# Patient Record
Sex: MALE | Race: WHITE | NOT HISPANIC OR LATINO | Employment: FULL TIME | URBAN - METROPOLITAN AREA
[De-identification: names, ages, dates, MRNs, and addresses within clinical notes are randomized per-mention and may not be internally consistent; named-entity substitution may affect disease eponyms.]

---

## 2024-11-19 ENCOUNTER — OFFICE VISIT (OUTPATIENT)
Dept: FAMILY MEDICINE CLINIC | Facility: CLINIC | Age: 64
End: 2024-11-19

## 2024-11-19 VITALS
HEIGHT: 70 IN | TEMPERATURE: 97.6 F | SYSTOLIC BLOOD PRESSURE: 144 MMHG | DIASTOLIC BLOOD PRESSURE: 80 MMHG | OXYGEN SATURATION: 96 % | WEIGHT: 202 LBS | BODY MASS INDEX: 28.92 KG/M2 | HEART RATE: 87 BPM

## 2024-11-19 DIAGNOSIS — Z12.5 PROSTATE CANCER SCREENING: ICD-10-CM

## 2024-11-19 DIAGNOSIS — Z76.89 ENCOUNTER TO ESTABLISH CARE WITH NEW DOCTOR: Primary | ICD-10-CM

## 2024-11-19 DIAGNOSIS — R73.03 PREDIABETES: ICD-10-CM

## 2024-11-19 DIAGNOSIS — I10 PRIMARY HYPERTENSION: ICD-10-CM

## 2024-11-19 PROCEDURE — 99204 OFFICE O/P NEW MOD 45 MIN: CPT | Performed by: FAMILY MEDICINE

## 2024-11-19 RX ORDER — HYDROCHLOROTHIAZIDE 25 MG/1
25 TABLET ORAL EVERY MORNING
COMMUNITY
Start: 2024-10-14 | End: 2024-11-19 | Stop reason: SDUPTHER

## 2024-11-19 RX ORDER — LOSARTAN POTASSIUM 100 MG/1
1 TABLET ORAL DAILY
COMMUNITY
Start: 2024-10-11 | End: 2024-11-19 | Stop reason: SDUPTHER

## 2024-11-19 RX ORDER — HYDROCHLOROTHIAZIDE 25 MG/1
25 TABLET ORAL EVERY MORNING
Qty: 90 TABLET | Refills: 2 | Status: SHIPPED | OUTPATIENT
Start: 2024-11-19

## 2024-11-19 RX ORDER — ATORVASTATIN CALCIUM 20 MG/1
1 TABLET, FILM COATED ORAL DAILY
COMMUNITY
Start: 2024-10-11 | End: 2024-11-19 | Stop reason: SDUPTHER

## 2024-11-19 RX ORDER — ATORVASTATIN CALCIUM 20 MG/1
20 TABLET, FILM COATED ORAL DAILY
Qty: 90 TABLET | Refills: 2 | Status: SHIPPED | OUTPATIENT
Start: 2024-11-19

## 2024-11-19 RX ORDER — LOSARTAN POTASSIUM 100 MG/1
100 TABLET ORAL DAILY
Qty: 90 TABLET | Refills: 2 | Status: SHIPPED | OUTPATIENT
Start: 2024-11-19

## 2024-11-19 RX ORDER — ASPIRIN 81 MG/1
81 TABLET, CHEWABLE ORAL DAILY
COMMUNITY

## 2024-11-19 NOTE — PROGRESS NOTES
Name: Trent Fonseca      : 1960      MRN: [id number]  Encounter Provider: Sierra Qurioz DO  Encounter Date: 2024   Encounter department: [facility] [address]  :  Assessment & Plan  Encounter to establish care with new doctor         Prediabetes    Orders:    Hemoglobin A1C; Future    Primary hypertension    Orders:    CBC and differential; Future    Comprehensive metabolic panel; Future    Lipid panel; Future    atorvastatin (LIPITOR) 20 mg tablet; Take 1 tablet (20 mg total) by mouth in the morning    hydroCHLOROthiazide 25 mg tablet; Take 1 tablet (25 mg total) by mouth every morning    losartan (COZAAR) 100 MG tablet; Take 1 tablet (100 mg total) by mouth in the morning    Prostate cancer screening    Orders:    PSA, Total Screen; Future         History of Present Illness     HPI    Patient presents to the office to establish care. States that he has no concerns. He is compliant with his medications.     Review of Systems    Past Medical History   History reviewed. No pertinent past medical history.  Past Surgical History:   Procedure Laterality Date    APPENDECTOMY      COLON SURGERY      ,,     Family History   Problem Relation Age of Onset    Heart disease Father     Heart disease Paternal Grandmother       reports that he has never smoked. He has never used smokeless tobacco. He reports that he does not currently use alcohol. He reports that he does not currently use drugs.  Current Outpatient Medications on File Prior to Visit   Medication Sig Dispense Refill    aspirin 81 mg chewable tablet Chew 81 mg daily      [DISCONTINUED] atorvastatin (LIPITOR) 20 mg tablet Take 1 tablet by mouth in the morning      [DISCONTINUED] hydroCHLOROthiazide 25 mg tablet Take 25 mg by mouth every morning      [DISCONTINUED] losartan (COZAAR) 100 MG tablet Take 1 tablet by mouth in the morning       No current facility-administered medications on file  "prior to visit.   No Known Allergies        Objective   /80 (Patient Position: Sitting, Cuff Size: Standard)   Pulse 87   Temp 97.6 °F (36.4 °C)   Ht 5' 10\" (1.778 m)   Wt 91.6 kg (202 lb)   SpO2 96%   BMI 28.98 kg/m²      Physical Exam  Vitals reviewed.   Constitutional:       General: He is not in acute distress.     Appearance: Normal appearance.   HENT:      Head: Normocephalic and atraumatic.      Right Ear: External ear normal.      Left Ear: External ear normal.      Nose: Nose normal.      Mouth/Throat:      Mouth: Mucous membranes are moist.   Eyes:      Extraocular Movements: Extraocular movements intact.      Conjunctiva/sclera: Conjunctivae normal.      Pupils: Pupils are equal, round, and reactive to light.   Cardiovascular:      Rate and Rhythm: Normal rate and regular rhythm.      Heart sounds: Normal heart sounds.   Pulmonary:      Effort: Pulmonary effort is normal.      Breath sounds: Normal breath sounds. No wheezing, rhonchi or rales.   Abdominal:      General: Bowel sounds are normal. There is no distension.      Palpations: Abdomen is soft.      Tenderness: There is no abdominal tenderness.   Musculoskeletal:      Cervical back: Neck supple.      Right lower leg: No edema.      Left lower leg: No edema.   Lymphadenopathy:      Cervical: No cervical adenopathy.   Skin:     General: Skin is warm.      Capillary Refill: Capillary refill takes less than 2 seconds.      Findings: No rash.   Neurological:      Mental Status: He is alert. Mental status is at baseline.           DO Ramos Amezcua [facility]  11/19/2024 11:23 AM      "

## 2024-11-21 ENCOUNTER — TELEPHONE (OUTPATIENT)
Dept: ADMINISTRATIVE | Facility: OTHER | Age: 64
End: 2024-11-21

## 2024-11-21 NOTE — TELEPHONE ENCOUNTER
Upon review of the In Basket request and the patient's chart, initial outreach has been made via fax to facility. Please see Contacts section for details.     Thank you  Pauline Burns MA

## 2024-11-21 NOTE — LETTER
Procedure Request Form: Colonoscopy      Date Requested: 24  Patient: Trent Fonseca  Patient : 1960   Referring Provider: Sierra Quiroz, DO        Date of Procedure ______________________________       The above patient has informed us that they have completed their   most recent Colonoscopy at your facility. Please complete   this form and attach all corresponding procedure reports/results.    Comments __________________________________________________________  ____________________________________________________________________  ____________________________________________________________________  ____________________________________________________________________    Facility Completing Procedure _________________________________________    Form Completed By (print name) _______________________________________      Signature __________________________________________________________        These reports are needed for  compliance.    Please fax this completed form and a copy of the procedure report to our office located at [address] [place] [address] as soon as possible to Fax [phone] attention Pauline: Phone [phone]    We thank you for your assistance in treating our mutual patient.               Procedure Request Form: Medicare Annual Wellness Visit (AWV)      Date Requested: 24  Patient: Trent Fonseca  Patient : 1960   Referring Provider: Sierra Quiroz, DO        Date of Procedure ______________________________       The above patient has informed us that they have completed their   most recent Medicare Annual Wellness Visit (AWV) at your facility. Please complete   this form and attach all corresponding procedure reports/results.    Comments  __________________________________________________________  ____________________________________________________________________  ____________________________________________________________________  ____________________________________________________________________    Facility Completing Procedure _________________________________________    Form Completed By (print name) _______________________________________      Signature __________________________________________________________        These reports are needed for  compliance.    Please fax this completed form and a copy of the procedure report to our office located at [address] [place] [phone] as soon as possible to Fax [phone] attention Pauline: Phone [phone]    We thank you for your assistance in treating our mutual patient.

## 2024-11-21 NOTE — TELEPHONE ENCOUNTER
----- Message from Martha WOOD sent at 11/19/2024 11:05 AM EST -----  Regarding: care gap request Colonoscopy  11/19/24 11:05 AM    Hello, our patient attached above has had CRC: Colonoscopy completed/performed. Please assist in updating the patient chart by making an External outreach to [facility]  facility located in [place] . The date of service is 03/07/2024.    Thank you,  Martha HELM [address] [place] [address]

## 2024-11-21 NOTE — LETTER
2nd Request          Procedure Request Form: Colonoscopy      Date Requested: 24  Patient: Trent Fonseca  Patient : 1960   Referring Provider: Sierra Quiroz DO        Date of Procedure ______________________________       The above patient has informed us that they have completed their   most recent Colonoscopy at your facility. Please complete   this form and attach all corresponding procedure reports/results.    Comments __________________________________________________________  ____________________________________________________________________  ____________________________________________________________________  ____________________________________________________________________    Facility Completing Procedure _________________________________________    Form Completed By (print name) _______________________________________      Signature __________________________________________________________        These reports are needed for  compliance.    Please fax this completed form and a copy of the procedure report to our office located at [address] [place] [address] as soon as possible to Fax [phone] attention Pauline: Phone [phone]    We thank you for your assistance in treating our mutual patient.                                                                                 2nd Request          Procedure Request Form: Medicare Annual Wellness Visit (AWV)      Date Requested: 24  Patient: Trent Fonseca  Patient : 1960   Referring Provider: Sierra Quiroz DO        Date of Procedure ______________________________       The above patient has informed us that they have completed their   most recent Medicare Annual Wellness Visit (AWV) at your facility. Please complete   this form and attach all corresponding procedure reports/results.    Comments  __________________________________________________________  ____________________________________________________________________  ____________________________________________________________________  ____________________________________________________________________    Facility Completing Procedure _________________________________________    Form Completed By (print name) _______________________________________      Signature __________________________________________________________        These reports are needed for  compliance.    Please fax this completed form and a copy of the procedure report to our office located at [address] [place] [phone] as soon as possible to Fax [phone] attention Pauline: Phone [phone]    We thank you for your assistance in treating our mutual patient.

## 2024-11-29 NOTE — TELEPHONE ENCOUNTER
As a follow-up, a second attempt has been made for outreach via fax to facility. Please see Contacts section for details.    Thank you  Pauline Burns MA

## 2024-12-04 NOTE — TELEPHONE ENCOUNTER
As a final attempt, a third outreach has been made via telephone call to facility. Please see Contacts section for details. This encounter will be closed and completed by end of day. Should we receive the requested information because of previous outreach attempts, the requested patient's chart will be updated appropriately.     Thank you  Pauline Burns MA

## 2024-12-23 ENCOUNTER — TELEPHONE (OUTPATIENT)
Dept: ADMINISTRATIVE | Facility: OTHER | Age: 64
End: 2024-12-23

## 2024-12-23 NOTE — TELEPHONE ENCOUNTER
----- Message from Andreina ELLIS sent at 12/20/2024  1:17 PM EST -----  Regarding: AWV  12/20/24 1:17 PM    Hello, our patient attached above has had Medicare AWV completed/performed. Please assist in updating the patient chart by pulling the document from the Media Tab. The date of service is 1/29/2024.     Thank you,  Andreina HELM [address]

## 2024-12-24 NOTE — TELEPHONE ENCOUNTER
Upon review of the In Basket request we were able to locate, review, and update the patient chart as requested for Medicare AW.    Any additional questions or concerns should be emailed to the Practice Liaisons via the appropriate education email address, please do not reply via In Basket.    Thank you  Klaus Benavidez MA   [facility]

## 2025-02-09 ENCOUNTER — HOSPITAL ENCOUNTER (EMERGENCY)
Facility: HOSPITAL | Age: 65
Discharge: HOME | End: 2025-02-10
Attending: EMERGENCY MEDICINE
Payer: MEDICARE

## 2025-02-09 DIAGNOSIS — R33.9 URINARY RETENTION: Primary | ICD-10-CM

## 2025-02-09 DIAGNOSIS — N40.0 ENLARGED PROSTATE: ICD-10-CM

## 2025-02-09 LAB
BASOPHILS # BLD AUTO: 0.13 THOUSANDS/ÂΜL (ref 0–0.1)
BASOPHILS NFR BLD AUTO: 1 % (ref 0–1)
EOSINOPHIL # BLD AUTO: 0.31 THOUSAND/ÂΜL (ref 0–0.61)
EOSINOPHIL NFR BLD AUTO: 3 % (ref 0–6)
ERYTHROCYTE [DISTWIDTH] IN BLOOD BY AUTOMATED COUNT: 13 % (ref 11.6–15.1)
HCT VFR BLD AUTO: 45.7 % (ref 36.5–49.3)
HGB BLD-MCNC: 15.8 G/DL (ref 12–17)
IMM GRANULOCYTES # BLD AUTO: 0.05 THOUSAND/UL (ref 0–0.2)
IMM GRANULOCYTES NFR BLD AUTO: 0 % (ref 0–2)
LYMPHOCYTES # BLD AUTO: 1.93 THOUSANDS/ÂΜL (ref 0.6–4.47)
LYMPHOCYTES NFR BLD AUTO: 16 % (ref 14–44)
MCH RBC QN AUTO: 29.9 PG (ref 26.8–34.3)
MCHC RBC AUTO-ENTMCNC: 34.6 G/DL (ref 31.4–37.4)
MCV RBC AUTO: 87 FL (ref 82–98)
MONOCYTES # BLD AUTO: 1.1 THOUSAND/ÂΜL (ref 0.17–1.22)
MONOCYTES NFR BLD AUTO: 9 % (ref 4–12)
NEUTROPHILS # BLD AUTO: 8.3 THOUSANDS/ÂΜL (ref 1.85–7.62)
NEUTS SEG NFR BLD AUTO: 71 % (ref 43–75)
NRBC BLD AUTO-RTO: 0 /100 WBCS
PLATELET # BLD AUTO: 275 THOUSANDS/UL (ref 149–390)
PMV BLD AUTO: 9.8 FL (ref 8.9–12.7)
RBC # BLD AUTO: 5.28 MILLION/UL (ref 3.88–5.62)
WBC # BLD AUTO: 11.82 THOUSAND/UL (ref 4.31–10.16)

## 2025-02-09 PROCEDURE — 83690 ASSAY OF LIPASE: CPT

## 2025-02-09 PROCEDURE — 85025 COMPLETE CBC W/AUTO DIFF WBC: CPT

## 2025-02-09 PROCEDURE — 80053 COMPREHEN METABOLIC PANEL: CPT

## 2025-02-09 PROCEDURE — 99285 EMERGENCY DEPT VISIT HI MDM: CPT

## 2025-02-09 PROCEDURE — 36415 COLL VENOUS BLD VENIPUNCTURE: CPT

## 2025-02-09 PROCEDURE — 99284 EMERGENCY DEPT VISIT MOD MDM: CPT

## 2025-02-09 RX ORDER — LIDOCAINE HYDROCHLORIDE 20 MG/ML
1 JELLY TOPICAL ONCE
Status: COMPLETED | OUTPATIENT
Start: 2025-02-09 | End: 2025-02-09

## 2025-02-09 RX ADMIN — LIDOCAINE HYDROCHLORIDE 1 APPLICATION: 20 JELLY TOPICAL at 23:25

## 2025-02-10 ENCOUNTER — APPOINTMENT (EMERGENCY)
Dept: CT IMAGING | Facility: HOSPITAL | Age: 65
End: 2025-02-10
Payer: MEDICARE

## 2025-02-10 ENCOUNTER — TELEPHONE (OUTPATIENT)
Age: 65
End: 2025-02-10

## 2025-02-10 VITALS
RESPIRATION RATE: 18 BRPM | TEMPERATURE: 97.7 F | HEART RATE: 94 BPM | DIASTOLIC BLOOD PRESSURE: 83 MMHG | OXYGEN SATURATION: 93 % | SYSTOLIC BLOOD PRESSURE: 176 MMHG

## 2025-02-10 LAB
ALBUMIN SERPL BCG-MCNC: 5 G/DL (ref 3.5–5)
ALP SERPL-CCNC: 80 U/L (ref 34–104)
ALT SERPL W P-5'-P-CCNC: 34 U/L (ref 7–52)
ANION GAP SERPL CALCULATED.3IONS-SCNC: 11 MMOL/L (ref 4–13)
AST SERPL W P-5'-P-CCNC: 30 U/L (ref 13–39)
BILIRUB SERPL-MCNC: 1.2 MG/DL (ref 0.2–1)
BILIRUB UR QL STRIP: NEGATIVE
BUN SERPL-MCNC: 14 MG/DL (ref 5–25)
CALCIUM SERPL-MCNC: 9.7 MG/DL (ref 8.4–10.2)
CHLORIDE SERPL-SCNC: 95 MMOL/L (ref 96–108)
CLARITY UR: CLEAR
CO2 SERPL-SCNC: 25 MMOL/L (ref 21–32)
COLOR UR: NORMAL
CREAT SERPL-MCNC: 0.99 MG/DL (ref 0.6–1.3)
GFR SERPL CREATININE-BSD FRML MDRD: 79 ML/MIN/1.73SQ M
GLUCOSE SERPL-MCNC: 112 MG/DL (ref 65–140)
GLUCOSE UR STRIP-MCNC: NEGATIVE MG/DL
HGB UR QL STRIP.AUTO: NEGATIVE
KETONES UR STRIP-MCNC: NEGATIVE MG/DL
LEUKOCYTE ESTERASE UR QL STRIP: NEGATIVE
LIPASE SERPL-CCNC: 24 U/L (ref 11–82)
NITRITE UR QL STRIP: NEGATIVE
PH UR STRIP.AUTO: 6 [PH]
POTASSIUM SERPL-SCNC: 3.7 MMOL/L (ref 3.5–5.3)
PROT SERPL-MCNC: 8 G/DL (ref 6.4–8.4)
PROT UR STRIP-MCNC: NEGATIVE MG/DL
SODIUM SERPL-SCNC: 131 MMOL/L (ref 135–147)
SP GR UR STRIP.AUTO: 1.01 (ref 1–1.03)
UROBILINOGEN UR STRIP-ACNC: <2 MG/DL

## 2025-02-10 PROCEDURE — 81003 URINALYSIS AUTO W/O SCOPE: CPT

## 2025-02-10 PROCEDURE — 74177 CT ABD & PELVIS W/CONTRAST: CPT

## 2025-02-10 RX ADMIN — IOHEXOL 100 ML: 350 INJECTION, SOLUTION INTRAVENOUS at 00:21

## 2025-02-10 NOTE — DISCHARGE INSTRUCTIONS
Follow-up with PCP and urology.  Continue to monitor symptoms at home.  If you develop any catheter issues or worsening symptoms return to the ER.    CT findings:  1. 6.5 cm left adrenal mass which is mostly gross fat attenuation but also contains ill-defined areas  of soft tissue attenuation as well as multifocal peripheral calcifications. Potential etiologies include  myelolipoma, chronic fat necrosis, or dermoid. Malignancy not excluded. No priors for comparison.  2. Heterogeneous masslike enlargement of prostate gland with large central nodular protrusion into  the posterior aspect of the suboptimally visualized underdistended urinary bladder. The appearance  is suspicious for primary prostate malignancy.

## 2025-02-10 NOTE — TELEPHONE ENCOUNTER
:     New Patient     Appointment Scheduling:  What office location does the patient prefer?: [place]  What is the reason for the patient's appointment?: Er follow up Retention   Have patient records been requested?: no   If No, are the records showing in Epic: yes  Appointment Details: Date  2/20/25  Time:    [address] Location:  [place]  Provider:  rogelio and nurse  Does the appointment need further review? (Reason)     Schedule Instructions    Schedule rich removal and void trial within 1 week. Visits need to be approximately 6 hours apart (AM visit on AP schedule, PM visit on Nurse schedule).     Urgent slot for AM AP schedule is OK.         HISTORY:   Is the patient having active symptoms? If so, describe symptoms:retention pt was in the Er and a rich was placed     Has the patient had any previous Urologist(s)?:no  Was the patient seen in the ED?:yes  Has the patient had any outside testing done?:no  Does patient have Imaging/Lab Results:  Does the patient have a personal history of any cancer?: no     INSURANCE:   Have you confirmed Patient's insurance? Yes   Is the insurance accepted?  yes  Is the insurance active? yes

## 2025-02-11 ENCOUNTER — OFFICE VISIT (OUTPATIENT)
Dept: FAMILY MEDICINE CLINIC | Facility: CLINIC | Age: 65
End: 2025-02-11
Payer: MEDICARE

## 2025-02-11 VITALS
OXYGEN SATURATION: 97 % | BODY MASS INDEX: 29.49 KG/M2 | WEIGHT: 206 LBS | HEART RATE: 94 BPM | TEMPERATURE: 97.1 F | SYSTOLIC BLOOD PRESSURE: 122 MMHG | DIASTOLIC BLOOD PRESSURE: 82 MMHG | HEIGHT: 70 IN

## 2025-02-11 DIAGNOSIS — I70.90 ATHEROSCLEROSIS OF ARTERIES: ICD-10-CM

## 2025-02-11 DIAGNOSIS — E78.5 HYPERLIPIDEMIA, UNSPECIFIED HYPERLIPIDEMIA TYPE: ICD-10-CM

## 2025-02-11 DIAGNOSIS — E27.8 LEFT ADRENAL MASS (HCC): ICD-10-CM

## 2025-02-11 DIAGNOSIS — Z82.49 FAMILY HISTORY OF CORONARY ARTERY BYPASS SURGERY: ICD-10-CM

## 2025-02-11 DIAGNOSIS — R33.9 URINARY RETENTION: ICD-10-CM

## 2025-02-11 DIAGNOSIS — I10 HTN (HYPERTENSION), BENIGN: ICD-10-CM

## 2025-02-11 DIAGNOSIS — R93.5 ABNORMAL CT OF THE ABDOMEN: ICD-10-CM

## 2025-02-11 DIAGNOSIS — N40.0 ENLARGED PROSTATE: ICD-10-CM

## 2025-02-11 DIAGNOSIS — Z00.00 MEDICARE ANNUAL WELLNESS VISIT, SUBSEQUENT: Primary | ICD-10-CM

## 2025-02-11 PROBLEM — I25.10 CORONARY ATHEROSCLEROSIS: Status: ACTIVE | Noted: 2025-02-11

## 2025-02-11 PROBLEM — R97.20 ELEVATED PSA: Status: ACTIVE | Noted: 2025-02-11

## 2025-02-11 PROBLEM — Z80.0 FAMILY HX OF COLON CANCER: Status: ACTIVE | Noted: 2025-02-11

## 2025-02-11 PROCEDURE — G0438 PPPS, INITIAL VISIT: HCPCS

## 2025-02-11 PROCEDURE — G2211 COMPLEX E/M VISIT ADD ON: HCPCS

## 2025-02-11 PROCEDURE — 99214 OFFICE O/P EST MOD 30 MIN: CPT

## 2025-02-11 NOTE — PROGRESS NOTES
"Name: Trent Fonseca      : 1960      MRN: [id number]  Encounter Provider: Soila Guzman PA-C  Encounter Date: 2025   Encounter department: [facility] [address] [facility]    Assessment & Plan  Medicare annual wellness visit, subsequent  -Labs ordered 2024 when PT was est to office  -PT going to get done tomorrow   -Requesting health records from [facility], [facility] care, PCP was Dr Mijares   -Last colonoscopy 3/7/2024 with [facility], sending care gap request        Urinary retention  -PT seen in ED 2/10/2025 for urinary retention that began   -Notes on Saturday, his urine was decreasing/only small dribbles but felt intense urge to urinate. He reported to ED, bladder scan demonstrated 750 cc,   CT a/p - \"Enlarged prostate. Prostate indents the bladder base. Bladder is essentially collapsed around a bladder catheter.\"   -Catheter in place, f/u with urology on 2025  -Denies issues with catheter, he has been emptying full bag about 1.5-2 hrs  -Notes seeing some red in urine when emptying catheter. On exam, some blood noted around catheter insertion site  -Notes about every 6 weeks for past 2-4 years, urine would slow down in frequency. Also notes it occurred at times with constipation. Never had inability to urinate. Denies urinary pain, burning. Reports he is able to have BM       Atherosclerosis of arteries  -Stress test completed 5 years ago, unremarkable. Going to get previous records   -Notes he previouly had calcium scoring 5 years ago- need to get records. Going to order CT coronary calcium score to assess   -PT on Lipitor and ASA  -Father hx of CABG  -Denies chest pain, sob, almeida, palpitations. PT does not have anginal symptoms at visit   Orders:    CT coronary calcium score; Future    Left adrenal mass (HCC)  -on CT from 2/10/2025 - 5.7 cm fat containing mass In left adrenal gland  -Repeat imaging 6-12 months, since size is " >4cm  -No imaging to compare   -Ordered metanephrines urine and plasma, aldosterone, and ald/renin ratio to assess function of adrenal glands  -Pending labs can consider referral to endocrine     Orders:    Metanephrines Fractionated, urine, 24 hour; Future    Aldosterone/Renin Ratio; Future    Aldosterone; Future    Metanephrine, Fractionated Plasma Free; Future    Abnormal CT of the abdomen  -CT done in ED 2/10/2025  -mild to moderate atherosclerosis without aortic aneurysm noted  -Cholelithiasis without cholecystitis. No RUQ pain   -Enlarged prostate        Enlarged prostate  -Noted on CT 2/10/2025  -Needs to complete PSA level  -Appt with urology 2/21/2025       Family history of coronary artery bypass surgery    Orders:    CT coronary calcium score; Future    HTN (hypertension), benign  -/82   -Cont Losartan 100 mg and hydrochlorothiazide 25 mg        Hyperlipidemia, unspecified hyperlipidemia type  -Lipid panel previously ordered  -Cont Lipitor 20 mg daily           Preventive health issues were discussed with patient, and age appropriate screening tests were ordered as noted in patient's After Visit Summary. Personalized health advice and appropriate referrals for health education or preventive services given if needed, as noted in patient's After Visit Summary.    History of Present Illness     Trent presents to the office for ED follow up and AWV.        HPI   Patient Care Team:  Sierra Quiroz DO as PCP - General (Family Medicine)    Review of Systems   Constitutional:  Negative for chills, fatigue and fever.   HENT:  Negative for congestion, ear pain, rhinorrhea, sinus pain and sore throat.    Eyes:  Negative for pain.   Respiratory:  Negative for cough and shortness of breath.    Cardiovascular:  Negative for chest pain and leg swelling.   Gastrointestinal:  Negative for abdominal pain, constipation, diarrhea, nausea and vomiting.   Genitourinary:  Negative for difficulty urinating, dysuria,  frequency and urgency.   Musculoskeletal:  Negative for neck pain.   Skin:  Negative for rash.   Neurological:  Negative for dizziness and headaches.   Psychiatric/Behavioral:  Negative for sleep disturbance.      Medical History Reviewed by provider this encounter:  Tobacco  Allergies  Meds  Problems  Med Hx  Surg Hx  Fam Hx       Annual Wellness Visit Questionnaire   Trent is here for his Subsequent Wellness visit.     Health Risk Assessment:   Patient rates overall health as good. Patient feels that their physical health rating is same. Patient is very satisfied with their life. Eyesight was rated as same. Hearing was rated as same. Patient feels that their emotional and mental health rating is same. Patients states they are never, rarely angry. Patient states they are sometimes unusually tired/fatigued. Pain experienced in the last 7 days has been none. Patient states that he has experienced no weight loss or gain in last 6 months.     Depression Screening:   PHQ-2 Score: 0      Fall Risk Screening:   In the past year, patient has experienced: no history of falling in past year      Home Safety:  Patient does not have trouble with stairs inside or outside of their home. Patient has working smoke alarms and has working carbon monoxide detector. Home safety hazards include: none.     Nutrition:   Current diet is Regular.     Medications:   Patient is not currently taking any over-the-counter supplements. Patient is not able to manage medications.     Activities of Daily Living (ADLs)/Instrumental Activities of Daily Living (IADLs):   Walk and transfer into and out of bed and chair?: Yes  Dress and groom yourself?: Yes    Bathe or shower yourself?: Yes    Feed yourself? Yes  Do your laundry/housekeeping?: Yes  Manage your money, pay your bills and track your expenses?: Yes  Make your own meals?: Yes    Do your own shopping?: Yes    Previous Hospitalizations:   Any hospitalizations or ED visits within the  "last 12 months?: No      Advance Care Planning:   Living will: Yes    Durable POA for healthcare: Yes    Advanced directive: Yes      PREVENTIVE SCREENINGS      Cardiovascular Screening:    General: Screening Not Indicated and History Lipid Disorder      Diabetes Screening:     General: Screening Current      Abdominal Aortic Aneurysm (AAA) Screening:    Risk factors include: age between 65-76 yo        Lung Cancer Screening:     General: Screening Not Indicated    Screening, Brief Intervention, and Referral to Treatment (SBIRT)     Screening  Typical number of drinks in a day: 0  Typical number of drinks in a week: 0  Interpretation: Low risk drinking behavior.    Single Item Drug Screening:  How often have you used an illegal drug (including marijuana) or a prescription medication for non-medical reasons in the past year? never    Single Item Drug Screen Score: 0  Interpretation: Negative screen for possible drug use disorder    Other Counseling Topics:   Car/seat belt/driving safety.     Social Drivers of Health     Food Insecurity: No Food Insecurity (2/11/2025)    Hunger Vital Sign     Worried About Running Out of Food in the Last Year: Never true     Ran Out of Food in the Last Year: Never true   Transportation Needs: No Transportation Needs (2/11/2025)    PRAPARE - Transportation     Lack of Transportation (Medical): No     Lack of Transportation (Non-Medical): No   Housing Stability: Low Risk  (2/11/2025)    Housing Stability Vital Sign     Unable to Pay for Housing in the Last Year: No     Number of Times Moved in the Last Year: 0     Homeless in the Last Year: No   Utilities: Not At Risk (2/11/2025)    [facility] Utilities     Threatened with loss of utilities: No     No results found.    Objective   /82 (BP Location: Left arm, Patient Position: Sitting, Cuff Size: Standard)   Pulse 94   Temp (!) 97.1 °F (36.2 °C) (Temporal)   Ht 5' 10\" (1.778 m)   Wt 93.4 kg (206 lb)   SpO2 97%   BMI 29.56 kg/m² "     Physical Exam  Vitals reviewed.   Constitutional:       General: He is not in acute distress.     Appearance: Normal appearance.   HENT:      Head: Normocephalic and atraumatic.      Right Ear: Tympanic membrane, ear canal and external ear normal.      Left Ear: Tympanic membrane, ear canal and external ear normal.      Nose: Nose normal. No congestion.      Right Sinus: No maxillary sinus tenderness or frontal sinus tenderness.      Left Sinus: No maxillary sinus tenderness or frontal sinus tenderness.      Mouth/Throat:      Mouth: Mucous membranes are moist.      Pharynx: Oropharynx is clear. No posterior oropharyngeal erythema or postnasal drip.   Eyes:      Extraocular Movements: Extraocular movements intact.      Conjunctiva/sclera: Conjunctivae normal.   Cardiovascular:      Rate and Rhythm: Normal rate and regular rhythm.      Heart sounds: Normal heart sounds. No murmur heard.  Pulmonary:      Effort: Pulmonary effort is normal.      Breath sounds: Normal breath sounds. No wheezing, rhonchi or rales.   Abdominal:      General: Bowel sounds are normal. There is no distension.      Palpations: Abdomen is soft.      Tenderness: There is no abdominal tenderness.   Genitourinary:     Comments: Urinary catheter in place. Some blood noted at insertion site around urethral meatus. No blood in bag.   Musculoskeletal:      Cervical back: Neck supple.      Right lower leg: No edema.      Left lower leg: No edema.   Lymphadenopathy:      Cervical: No cervical adenopathy.   Skin:     General: Skin is warm.      Capillary Refill: Capillary refill takes less than 2 seconds.      Coloration: Skin is not jaundiced or pale.      Findings: No rash.   Neurological:      Mental Status: He is alert. Mental status is at baseline.           Soila Guzman PA-C  [facility]  2/11/2025 11:33 AM

## 2025-02-11 NOTE — PATIENT INSTRUCTIONS
Medicare Preventive Visit Patient Instructions  Thank you for completing your Welcome to Medicare Visit or Medicare Annual Wellness Visit today. Your next wellness visit will be due in one year (2/12/2026).  The screening/preventive services that you may require over the next 5-10 years are detailed below. Some tests may not apply to you based off risk factors and/or age. Screening tests ordered at today's visit but not completed yet may show as past due. Also, please note that scanned in results may not display below.  Preventive Screenings:  Service Recommendations Previous Testing/Comments   Colorectal Cancer Screening  Colonoscopy    Fecal Occult Blood Test (FOBT)/Fecal Immunochemical Test (FIT)  Fecal DNA/Cologuard Test  Flexible Sigmoidoscopy Age: 45-75 years old   Colonoscopy: every 10 years (May be performed more frequently if at higher risk)  OR  FOBT/FIT: every 1 year  OR  Cologuard: every 3 years  OR  Sigmoidoscopy: every 5 years  Screening may be recommended earlier than age 45 if at higher risk for colorectal cancer. Also, an individualized decision between you and your healthcare provider will decide whether screening between the ages of 76-85 would be appropriate. Colonoscopy: Not on file  FOBT/FIT: Not on file  Cologuard: Not on file  Sigmoidoscopy: Not on file          Prostate Cancer Screening Individualized decision between patient and health care provider in men between ages of 55-69   Medicare will cover every 12 months beginning on the day after your 50th birthday PSA: No results in last 5 years           Hepatitis C Screening Once for adults born between 1945 and 1965  More frequently in patients at high risk for Hepatitis C Hep C Antibody: Not on file        Diabetes Screening 1-2 times per year if you're at risk for diabetes or have pre-diabetes Fasting glucose: No results in last 5 years (No results in last 5 years)  A1C: No results in last 5 years (No results in last 5 years)  Screening  Current   Cholesterol Screening Once every 5 years if you don't have a lipid disorder. May order more often based on risk factors. Lipid panel: Not on file         Other Preventive Screenings Covered by Medicare:  Abdominal Aortic Aneurysm (AAA) Screening: covered once if your at risk. You're considered to be at risk if you have a family history of AAA or a male between the age of 65-75 who smoking at least 100 cigarettes in your lifetime.  Lung Cancer Screening: covers low dose CT scan once per year if you meet all of the following conditions: (1) Age 55-77; (2) No signs or symptoms of lung cancer; (3) Current smoker or have quit smoking within the last 15 years; (4) You have a tobacco smoking history of at least 20 pack years (packs per day x number of years you smoked); (5) You get a written order from a healthcare provider.  Glaucoma Screening: covered annually if you're considered high risk: (1) You have diabetes OR (2) Family history of glaucoma OR (3)  aged 50 and older OR (4)  American aged 65 and older  Osteoporosis Screening: covered every 2 years if you meet one of the following conditions: (1) Have a vertebral abnormality; (2) On glucocorticoid therapy for more than 3 months; (3) Have primary hyperparathyroidism; (4) On osteoporosis medications and need to assess response to drug therapy.  HIV Screening: covered annually if you're between the age of 15-65. Also covered annually if you are younger than 15 and older than 65 with risk factors for HIV infection. For pregnant patients, it is covered up to 3 times per pregnancy.    Immunizations:  Immunization Recommendations   Influenza Vaccine Annual influenza vaccination during flu season is recommended for all persons aged >= 6 months who do not have contraindications   Pneumococcal Vaccine   * Pneumococcal conjugate vaccine = PCV13 (Prevnar 13), PCV15 (Vaxneuvance), PCV20 (Prevnar 20)  * Pneumococcal polysaccharide vaccine = PPSV23  (Pneumovax) Adults 19-65 yo with certain risk factors or if 65+ yo  If never received any pneumonia vaccine: recommend Prevnar 20 (PCV20)  Give PCV20 if previously received 1 dose of PCV13 or PPSV23   Hepatitis B Vaccine 3 dose series if at intermediate or high risk (ex: diabetes, end stage renal disease, liver disease)   Respiratory syncytial virus (RSV) Vaccine - COVERED BY MEDICARE PART D  * RSVPreF3 (Arexvy) CDC recommends that adults 60 years of age and older may receive a single dose of RSV vaccine using shared clinical decision-making (SCDM)   Tetanus (Td) Vaccine - COST NOT COVERED BY MEDICARE PART B Following completion of primary series, a booster dose should be given every 10 years to maintain immunity against tetanus. Td may also be given as tetanus wound prophylaxis.   Tdap Vaccine - COST NOT COVERED BY MEDICARE PART B Recommended at least once for all adults. For pregnant patients, recommended with each pregnancy.   Shingles Vaccine (Shingrix) - COST NOT COVERED BY MEDICARE PART B  2 shot series recommended in those 19 years and older who have or will have weakened immune systems or those 50 years and older     Health Maintenance Due:      Topic Date Due   • Hepatitis C Screening  Never done   • Colorectal Cancer Screening  Never done     Immunizations Due:      Topic Date Due   • Pneumococcal Vaccine: 65+ Years (1 of 1 - PCV) Never done   • Influenza Vaccine (1) Never done   • COVID-19 Vaccine (3 - 2024-25 season) 09/01/2024     Advance Directives   What are advance directives?  Advance directives are legal documents that state your wishes and plans for medical care. These plans are made ahead of time in case you lose your ability to make decisions for yourself. Advance directives can apply to any medical decision, such as the treatments you want, and if you want to donate organs.   What are the types of advance directives?  There are many types of advance directives, and each state has rules about  how to use them. You may choose a combination of any of the following:  Living will:  This is a written record of the treatment you want. You can also choose which treatments you do not want, which to limit, and which to stop at a certain time. This includes surgery, medicine, IV fluid, and tube feedings.   Durable power of  for healthcare (DPAHC):  This is a written record that states who you want to make healthcare choices for you when you are unable to make them for yourself. This person, called a proxy, is usually a family member or a friend. You may choose more than 1 proxy.  Do not resuscitate (DNR) order:  A DNR order is used in case your heart stops beating or you stop breathing. It is a request not to have certain forms of treatment, such as CPR. A DNR order may be included in other types of advance directives.  Medical directive:  This covers the care that you want if you are in a coma, near death, or unable to make decisions for yourself. You can list the treatments you want for each condition. Treatment may include pain medicine, surgery, blood transfusions, dialysis, IV or tube feedings, and a ventilator (breathing machine).  Values history:  This document has questions about your views, beliefs, and how you feel and think about life. This information can help others choose the care that you would choose.  Why are advance directives important?  An advance directive helps you control your care. Although spoken wishes may be used, it is better to have your wishes written down. Spoken wishes can be misunderstood, or not followed. Treatments may be given even if you do not want them. An advance directive may make it easier for your family to make difficult choices about your care.   Weight Management   Why it is important to manage your weight:  Being overweight increases your risk of health conditions such as heart disease, high blood pressure, type 2 diabetes, and certain types of cancer. It can  also increase your risk for osteoarthritis, sleep apnea, and other respiratory problems. Aim for a slow, steady weight loss. Even a small amount of weight loss can lower your risk of health problems.  How to lose weight safely:  A safe and healthy way to lose weight is to eat fewer calories and get regular exercise. You can lose up about 1 pound a week by decreasing the number of calories you eat by 500 calories each day.   Healthy meal plan for weight management:  A healthy meal plan includes a variety of foods, contains fewer calories, and helps you stay healthy. A healthy meal plan includes the following:  Eat whole-grain foods more often.  A healthy meal plan should contain fiber. Fiber is the part of grains, fruits, and vegetables that is not broken down by your body. Whole-grain foods are healthy and provide extra fiber in your diet. Some examples of whole-grain foods are whole-wheat breads and pastas, oatmeal, brown rice, and bulgur.  Eat a variety of vegetables every day.  Include dark, leafy greens such as spinach, kale, pj greens, and mustard greens. Eat yellow and orange vegetables such as carrots, sweet potatoes, and winter squash.   Eat a variety of fruits every day.  Choose fresh or canned fruit (canned in its own juice or light syrup) instead of juice. Fruit juice has very little or no fiber.  Eat low-fat dairy foods.  Drink fat-free (skim) milk or 1% milk. Eat fat-free yogurt and low-fat cottage cheese. Try low-fat cheeses such as mozzarella and other reduced-fat cheeses.  Choose meat and other protein foods that are low in fat.  Choose beans or other legumes such as split peas or lentils. Choose fish, skinless poultry (chicken or turkey), or lean cuts of red meat (beef or pork). Before you cook meat or poultry, cut off any visible fat.   Use less fat and oil.  Try baking foods instead of frying them. Add less fat, such as margarine, sour cream, regular salad dressing and mayonnaise to foods.  Eat fewer high-fat foods. Some examples of high-fat foods include french fries, doughnuts, ice cream, and cakes.  Eat fewer sweets.  Limit foods and drinks that are high in sugar. This includes candy, cookies, regular soda, and sweetened drinks.  Exercise:  Exercise at least 30 minutes per day on most days of the week. Some examples of exercise include walking, biking, dancing, and swimming. You can also fit in more physical activity by taking the stairs instead of the elevator or parking farther away from stores. Ask your healthcare provider about the best exercise plan for you.      © Copyright [employer] 2018 Information is for End User's use only and may not be sold, redistributed or otherwise used for commercial purposes. All illustrations and images included in CareNotes® are the copyrighted property of [employer] Inc[employer] or [employer]

## 2025-02-12 ENCOUNTER — TELEPHONE (OUTPATIENT)
Dept: ADMINISTRATIVE | Facility: OTHER | Age: 65
End: 2025-02-12

## 2025-02-12 ENCOUNTER — APPOINTMENT (OUTPATIENT)
Age: 65
End: 2025-02-12
Payer: MEDICARE

## 2025-02-12 ENCOUNTER — TELEPHONE (OUTPATIENT)
Age: 65
End: 2025-02-12

## 2025-02-12 DIAGNOSIS — Z12.5 PROSTATE CANCER SCREENING: ICD-10-CM

## 2025-02-12 DIAGNOSIS — E27.8 LEFT ADRENAL MASS (HCC): ICD-10-CM

## 2025-02-12 DIAGNOSIS — I10 PRIMARY HYPERTENSION: ICD-10-CM

## 2025-02-12 DIAGNOSIS — R73.03 PREDIABETES: ICD-10-CM

## 2025-02-12 LAB
BASOPHILS # BLD AUTO: 0.1 THOUSANDS/ΜL (ref 0–0.1)
BASOPHILS NFR BLD AUTO: 1 % (ref 0–1)
EOSINOPHIL # BLD AUTO: 0.43 THOUSAND/ΜL (ref 0–0.61)
EOSINOPHIL NFR BLD AUTO: 4 % (ref 0–6)
ERYTHROCYTE [DISTWIDTH] IN BLOOD BY AUTOMATED COUNT: 13.2 % (ref 11.6–15.1)
EST. AVERAGE GLUCOSE BLD GHB EST-MCNC: 123 MG/DL
HBA1C MFR BLD: 5.9 %
HCT VFR BLD AUTO: 47.5 % (ref 36.5–49.3)
HGB BLD-MCNC: 15.6 G/DL (ref 12–17)
IMM GRANULOCYTES # BLD AUTO: 0.04 THOUSAND/UL (ref 0–0.2)
IMM GRANULOCYTES NFR BLD AUTO: 0 % (ref 0–2)
LYMPHOCYTES # BLD AUTO: 1.88 THOUSANDS/ΜL (ref 0.6–4.47)
LYMPHOCYTES NFR BLD AUTO: 19 % (ref 14–44)
MCH RBC QN AUTO: 29.3 PG (ref 26.8–34.3)
MCHC RBC AUTO-ENTMCNC: 32.8 G/DL (ref 31.4–37.4)
MCV RBC AUTO: 89 FL (ref 82–98)
MONOCYTES # BLD AUTO: 1.03 THOUSAND/ΜL (ref 0.17–1.22)
MONOCYTES NFR BLD AUTO: 10 % (ref 4–12)
NEUTROPHILS # BLD AUTO: 6.39 THOUSANDS/ΜL (ref 1.85–7.62)
NEUTS SEG NFR BLD AUTO: 66 % (ref 43–75)
NRBC BLD AUTO-RTO: 0 /100 WBCS
PLATELET # BLD AUTO: 285 THOUSANDS/UL (ref 149–390)
PMV BLD AUTO: 10.1 FL (ref 8.9–12.7)
PSA SERPL-MCNC: 7.42 NG/ML (ref 0–4)
RBC # BLD AUTO: 5.32 MILLION/UL (ref 3.88–5.62)
WBC # BLD AUTO: 9.87 THOUSAND/UL (ref 4.31–10.16)

## 2025-02-12 PROCEDURE — 82088 ASSAY OF ALDOSTERONE: CPT

## 2025-02-12 PROCEDURE — 80053 COMPREHEN METABOLIC PANEL: CPT

## 2025-02-12 PROCEDURE — G0103 PSA SCREENING: HCPCS

## 2025-02-12 PROCEDURE — 36415 COLL VENOUS BLD VENIPUNCTURE: CPT

## 2025-02-12 PROCEDURE — 85025 COMPLETE CBC W/AUTO DIFF WBC: CPT

## 2025-02-12 PROCEDURE — 83835 ASSAY OF METANEPHRINES: CPT

## 2025-02-12 PROCEDURE — 84244 ASSAY OF RENIN: CPT

## 2025-02-12 PROCEDURE — 80061 LIPID PANEL: CPT

## 2025-02-12 PROCEDURE — 83036 HEMOGLOBIN GLYCOSYLATED A1C: CPT

## 2025-02-12 NOTE — TELEPHONE ENCOUNTER
----- Message from Martha WOOD sent at 2/11/2025 11:51 AM EST -----  Regarding: care gap request colonoscopy  02/11/25 11:51 AM    Hello, our patient attached above has had CRC: Colonoscopy completed/performed. Please assist in updating the patient chart by making an External outreach to [facility] located in [place] . The date of service is 03/07/2024.    Thank you,  Martha HELM [address] [place] [address]

## 2025-02-12 NOTE — TELEPHONE ENCOUNTER
Patient would like additional directions regarding 24 hour urine collection. He currently has a catheter and is emptying his bag periodically. Instructions states to discard first morning urine.     Bag last emptied at 6am.    He would appreciate a call back with directions.

## 2025-02-12 NOTE — TELEPHONE ENCOUNTER
Patient returned call regarding the urine collection.     Advised of the 6 steps also including that he is to empty his catheter into the container and rachel the times.

## 2025-02-12 NOTE — TELEPHONE ENCOUNTER
Upon review of the In Basket request we were able to locate, review, and update the patient chart as requested for CRC: Colonoscopy.    Any additional questions or concerns should be emailed to the Practice Liaisons via the appropriate education email address, please do not reply via In Basket.    Thank you  Ayesha Varma MA   [facility]

## 2025-02-12 NOTE — TELEPHONE ENCOUNTER
The first time you have to pee in the morning, go as you normally would, without collecting it. Write down the time as the start time.  Every time you pee after that, collect it in the container. Be very careful to get all of your pee into the container and not get any poop or toilet paper into it. Keep the container in the fridge when you’re not using it.  Drink water throughout the day. You can eat normally unless there are foods your provider tells you to avoid.  Try to collect for the last time as close as possible to 24 hours after you marked the start time. For instance, if you have your first pee -- that you didn’t collect -- at 8 a.m. on a Monday, try to collect for the last time at 8 a.m. on Tuesday.  Make sure the container is marked with your name, start time, end time and any other required information.  Drop the container off with the lab as soon as possible. Your provider can tell you where you need to take it.

## 2025-02-13 ENCOUNTER — APPOINTMENT (OUTPATIENT)
Age: 65
End: 2025-02-13
Payer: MEDICARE

## 2025-02-13 DIAGNOSIS — E27.8 LEFT ADRENAL MASS (HCC): ICD-10-CM

## 2025-02-13 LAB
ALBUMIN SERPL BCG-MCNC: 4.5 G/DL (ref 3.5–5)
ALP SERPL-CCNC: 86 U/L (ref 34–104)
ALT SERPL W P-5'-P-CCNC: 24 U/L (ref 7–52)
ANION GAP SERPL CALCULATED.3IONS-SCNC: 12 MMOL/L (ref 4–13)
AST SERPL W P-5'-P-CCNC: 20 U/L (ref 13–39)
BILIRUB SERPL-MCNC: 1.28 MG/DL (ref 0.2–1)
BUN SERPL-MCNC: 16 MG/DL (ref 5–25)
CALCIUM SERPL-MCNC: 9.6 MG/DL (ref 8.4–10.2)
CHLORIDE SERPL-SCNC: 99 MMOL/L (ref 96–108)
CHOLEST SERPL-MCNC: 146 MG/DL (ref ?–200)
CO2 SERPL-SCNC: 29 MMOL/L (ref 21–32)
CREAT SERPL-MCNC: 1.03 MG/DL (ref 0.6–1.3)
GFR SERPL CREATININE-BSD FRML MDRD: 75 ML/MIN/1.73SQ M
GLUCOSE P FAST SERPL-MCNC: 108 MG/DL (ref 65–99)
HDLC SERPL-MCNC: 36 MG/DL
LDLC SERPL CALC-MCNC: 77 MG/DL (ref 0–100)
NONHDLC SERPL-MCNC: 110 MG/DL
POTASSIUM SERPL-SCNC: 3.6 MMOL/L (ref 3.5–5.3)
PROT SERPL-MCNC: 7.4 G/DL (ref 6.4–8.4)
SODIUM SERPL-SCNC: 140 MMOL/L (ref 135–147)
TRIGL SERPL-MCNC: 164 MG/DL (ref ?–150)

## 2025-02-13 PROCEDURE — 83835 ASSAY OF METANEPHRINES: CPT

## 2025-02-17 ENCOUNTER — RESULTS FOLLOW-UP (OUTPATIENT)
Dept: FAMILY MEDICINE CLINIC | Facility: CLINIC | Age: 65
End: 2025-02-17

## 2025-02-17 ENCOUNTER — TELEPHONE (OUTPATIENT)
Age: 65
End: 2025-02-17

## 2025-02-17 LAB
ALDOST SERPL-MCNC: 14.8 NG/DL (ref 0–30)
ALDOST SERPL-MCNC: 19.2 NG/DL (ref 0–30)
ALDOST/RENIN PLAS-RTO: 0.9 {RATIO} (ref 0–30)
METANEPH FREE SERPL-MCNC: <25 PG/ML (ref 0–88)
NORMETANEPHRINE SERPL-MCNC: 209.2 PG/ML (ref 0–285.2)
RENIN PLAS-CCNC: 21.28 NG/ML/HR (ref 0.17–5.38)

## 2025-02-17 NOTE — TELEPHONE ENCOUNTER
Pt's wife called.  States that she can see in pt's MyChart that a lot of the labs are resulted.  Inquiring if they will receive a MyChart message or phone call with provider recommendations or if they will have to schedule an appointment to go over results.  Informed wife it is their preference for phone call or MyChart.  Wife states MyChart is ok and if they have further questions they will just call back.  Please review and advise.

## 2025-02-18 ENCOUNTER — RESULTS FOLLOW-UP (OUTPATIENT)
Dept: FAMILY MEDICINE CLINIC | Facility: CLINIC | Age: 65
End: 2025-02-18

## 2025-02-18 ENCOUNTER — HOSPITAL ENCOUNTER (OUTPATIENT)
Dept: CT IMAGING | Facility: CLINIC | Age: 65
Discharge: HOME | End: 2025-02-18
Payer: COMMERCIAL

## 2025-02-18 DIAGNOSIS — I70.90 ATHEROSCLEROSIS OF ARTERIES: ICD-10-CM

## 2025-02-18 DIAGNOSIS — R79.89 HIGH SERUM RENIN: ICD-10-CM

## 2025-02-18 DIAGNOSIS — E27.8 LEFT ADRENAL MASS (HCC): Primary | ICD-10-CM

## 2025-02-18 DIAGNOSIS — Z82.49 FAMILY HISTORY OF CORONARY ARTERY BYPASS SURGERY: ICD-10-CM

## 2025-02-18 PROCEDURE — 75571 CT HRT W/O DYE W/CA TEST: CPT

## 2025-02-20 NOTE — PROGRESS NOTES
Name: Trent Fonseca      : 1960      MRN: [id number]  Encounter Provider: Emy Low PA-C  Encounter Date: 2025   Encounter department: [facility] [place]  :  Assessment & Plan  Benign prostatic hyperplasia with urinary retention  - CT AP w from 2/10/25 -  Prostate is enlarged. Prostate volume estimated at 120 mL. Prostate indents the bladder base. Bladder is essentially collapsed around a bladder catheter  - Start Flomax  - Voiding trial today, rich removed this AM. Return this PM for PVR. If >300 mL, reinsert rich and repeat voiding trial in 2 weeks. Discussed potential need for bladder outlet work-up with cystoscopy   Orders:    Ambulatory Referral to Urology    tamsulosin (FLOMAX) 0.4 mg; Take 1 capsule (0.4 mg total) by mouth daily with dinner    Elevated PSA  - PSA from 25 was 7.418 however may be falsely increased in setting of rich insertion. Patient also has very large prostate  - MARLEEN today negative  - Obtain repeat PSA at an interval. If remains progressively elevated, discussed prostate MRI for further evaluation which would be utilized for fusion guided prostate biopsy if concerning lesion.   Orders:    PSA Total, Diagnostic; Future    Adrenal mass, left (HCC)  - Recent CT showing calcifications of the left adrenal gland, possibly related to chronic inflammation or old trauma. There is a heterogeneous fat-containing mass in the left adrenal gland with some associated small calcifications differential considerations include myelolipoma, fat necrosis or dermoid this measures approximately 5.7 cm in size  - Biochemical work-up in process and has upcoming appt with endocrinology   - Discussed due to size, would recommendations discussion with surgeon regarding possible removal              History of Present Illness   Trent Fonseca is a 64 y.o. male who presents for new patient evaluation of urinary retention. He was seen in the ED on 25 with  inability to urinate requiring rich catheter insertion. He presents today to establish care and for voiding trial. PSA was obtained shortly after rich was placed and was elevated at 7. No prior PSAs on file for review. CT in ED showing BPH as well as large adrenal mass.  He denies any issues with urinary retention in the past. Leading up to this, he reports intermittent issues with weak urinary stream x years. No prior  surgical manipulation. No family history of  malignancy. He reports his PSA has been in 5-6 range for years. No prior prostate biopsy or work-up for this.       AUA SYMPTOM SCORE      Flowsheet Row Most Recent Value   AUA SYMPTOM SCORE    How often have you had a sensation of not emptying your bladder completely after you finished urinating? 1 (P)     How often have you had to urinate again less than two hours after you finished urinating? 1 (P)     How often have you found you stopped and started again several times when you urinate? 2 (P)     How often have you found it difficult to postpone urination? 2 (P)     How often have you had a weak urinary stream? 2 (P)     How often have you had to push or strain to begin urination? 2 (P)     How many times did you most typically get up to urinate from the time you went to bed at night until the time you got up in the morning? 2 (P)     Quality of Life: If you were to spend the rest of your life with your urinary condition just the way it is now, how would you feel about that? 3 (P)     AUA SYMPTOM SCORE 12 (P)            Review of Systems   Constitutional:  Negative for chills, diaphoresis and fever.   Respiratory:  Negative for shortness of breath.    Cardiovascular:  Negative for chest pain and palpitations.   Gastrointestinal:  Negative for abdominal pain.   Genitourinary:  Positive for difficulty urinating. Negative for dysuria, flank pain and hematuria.   Neurological:  Negative for dizziness and headaches.          Objective   There were no  vitals taken for this visit.    Physical Exam  Constitutional:       Appearance: Normal appearance.   HENT:      Head: Normocephalic and atraumatic.      Right Ear: External ear normal.      Left Ear: External ear normal.      Nose: Nose normal.   Eyes:      General: No scleral icterus.     Conjunctiva/sclera: Conjunctivae normal.   Cardiovascular:      Pulses: Normal pulses.   Pulmonary:      Effort: Pulmonary effort is normal.   Genitourinary:     Prostate: Enlarged. Not tender and no nodules present.   Musculoskeletal:         General: Normal range of motion.      Cervical back: Normal range of motion.   Skin:     General: Skin is warm and dry.   Neurological:      General: No focal deficit present.      Mental Status: He is alert and oriented to person, place, and time.   Psychiatric:         Mood and Affect: Mood normal.         Behavior: Behavior normal.         Thought Content: Thought content normal.         Judgment: Judgment normal.          Results   Lab Results   Component Value Date    PSA 7.418 (H) 02/12/2025     Lab Results   Component Value Date    CALCIUM 9.6 02/12/2025    K 3.6 02/12/2025    CO2 29 02/12/2025    CL 99 02/12/2025    BUN 16 02/12/2025    CREATININE 1.03 02/12/2025     Lab Results   Component Value Date    WBC 9.87 02/12/2025    HGB 15.6 02/12/2025    HCT 47.5 02/12/2025    MCV 89 02/12/2025     02/12/2025       Office Urine Dip  No results found for this or any previous visit (from the past hour).

## 2025-02-21 ENCOUNTER — TELEPHONE (OUTPATIENT)
Age: 65
End: 2025-02-21

## 2025-02-21 ENCOUNTER — OFFICE VISIT (OUTPATIENT)
Dept: UROLOGY | Facility: CLINIC | Age: 65
End: 2025-02-21
Payer: MEDICARE

## 2025-02-21 VITALS
HEIGHT: 70 IN | BODY MASS INDEX: 28.35 KG/M2 | HEART RATE: 81 BPM | OXYGEN SATURATION: 98 % | DIASTOLIC BLOOD PRESSURE: 90 MMHG | SYSTOLIC BLOOD PRESSURE: 152 MMHG | RESPIRATION RATE: 18 BRPM | WEIGHT: 198 LBS | TEMPERATURE: 97.1 F

## 2025-02-21 DIAGNOSIS — R33.8 BENIGN PROSTATIC HYPERPLASIA WITH URINARY RETENTION: ICD-10-CM

## 2025-02-21 DIAGNOSIS — N40.1 BENIGN PROSTATIC HYPERPLASIA WITH URINARY RETENTION: Primary | ICD-10-CM

## 2025-02-21 DIAGNOSIS — R33.8 BENIGN PROSTATIC HYPERPLASIA WITH URINARY RETENTION: Primary | ICD-10-CM

## 2025-02-21 DIAGNOSIS — E27.8 ADRENAL MASS, LEFT (HCC): ICD-10-CM

## 2025-02-21 DIAGNOSIS — N40.1 BENIGN PROSTATIC HYPERPLASIA WITH URINARY RETENTION: ICD-10-CM

## 2025-02-21 DIAGNOSIS — R97.20 ELEVATED PSA: ICD-10-CM

## 2025-02-21 LAB
METANEPH 24H UR-MRATE: 61 UG/24 HR (ref 58–276)
METANEPHS 24H UR-MCNC: 30 UG/L
NORMETANEPHRINE 24H UR-MCNC: 294 UG/L
NORMETANEPHRINE 24H UR-MRATE: 594 UG/24 HR (ref 156–729)
POST-VOID RESIDUAL VOLUME, ML POC: 605 ML

## 2025-02-21 PROCEDURE — 51798 US URINE CAPACITY MEASURE: CPT | Performed by: PHYSICIAN ASSISTANT

## 2025-02-21 PROCEDURE — 99204 OFFICE O/P NEW MOD 45 MIN: CPT | Performed by: PHYSICIAN ASSISTANT

## 2025-02-21 RX ORDER — TAMSULOSIN HYDROCHLORIDE 0.4 MG/1
0.4 CAPSULE ORAL
Qty: 30 CAPSULE | Refills: 2 | Status: SHIPPED | OUTPATIENT
Start: 2025-02-21 | End: 2025-02-21

## 2025-02-21 RX ORDER — TAMSULOSIN HYDROCHLORIDE 0.4 MG/1
0.4 CAPSULE ORAL
Qty: 90 CAPSULE | Refills: 0 | Status: SHIPPED | OUTPATIENT
Start: 2025-02-21

## 2025-02-21 NOTE — TELEPHONE ENCOUNTER
Pt called stated that he is unable to urinate and he is on his way back to the office. Pt lives an hour away.

## 2025-02-26 ENCOUNTER — OFFICE VISIT (OUTPATIENT)
Dept: FAMILY MEDICINE CLINIC | Facility: CLINIC | Age: 65
End: 2025-02-26
Payer: MEDICARE

## 2025-02-26 VITALS
HEART RATE: 90 BPM | HEIGHT: 70 IN | TEMPERATURE: 97.5 F | OXYGEN SATURATION: 98 % | WEIGHT: 196 LBS | DIASTOLIC BLOOD PRESSURE: 92 MMHG | SYSTOLIC BLOOD PRESSURE: 150 MMHG | BODY MASS INDEX: 28.06 KG/M2

## 2025-02-26 DIAGNOSIS — R93.1 HIGH CORONARY ARTERY CALCIUM SCORE: ICD-10-CM

## 2025-02-26 DIAGNOSIS — Z96.0 URINARY CATHETER IN PLACE: ICD-10-CM

## 2025-02-26 DIAGNOSIS — I10 PRIMARY HYPERTENSION: ICD-10-CM

## 2025-02-26 DIAGNOSIS — E27.8 ADRENAL MASS GREATER THAN 4 CM IN DIAMETER (HCC): ICD-10-CM

## 2025-02-26 DIAGNOSIS — Z00.00 ENCOUNTER FOR MEDICARE ANNUAL WELLNESS EXAM: Primary | ICD-10-CM

## 2025-02-26 DIAGNOSIS — R73.03 PREDIABETES: ICD-10-CM

## 2025-02-26 DIAGNOSIS — E78.5 HYPERLIPIDEMIA, UNSPECIFIED HYPERLIPIDEMIA TYPE: ICD-10-CM

## 2025-02-26 DIAGNOSIS — I25.10 ATHEROSCLEROSIS OF NATIVE CORONARY ARTERY OF NATIVE HEART WITHOUT ANGINA PECTORIS: ICD-10-CM

## 2025-02-26 PROCEDURE — G0438 PPPS, INITIAL VISIT: HCPCS | Performed by: FAMILY MEDICINE

## 2025-02-26 PROCEDURE — G0538 PR ASCVD RSK MNG CLIN STF PR MO: HCPCS | Performed by: FAMILY MEDICINE

## 2025-02-26 PROCEDURE — 99214 OFFICE O/P EST MOD 30 MIN: CPT | Performed by: FAMILY MEDICINE

## 2025-02-26 PROCEDURE — G2211 COMPLEX E/M VISIT ADD ON: HCPCS | Performed by: FAMILY MEDICINE

## 2025-02-26 RX ORDER — ATORVASTATIN CALCIUM 40 MG/1
40 TABLET, FILM COATED ORAL
Qty: 100 TABLET | Refills: 0 | Status: SHIPPED | OUTPATIENT
Start: 2025-02-26

## 2025-02-26 RX ORDER — LOSARTAN POTASSIUM 100 MG/1
100 TABLET ORAL DAILY
Qty: 90 TABLET | Refills: 2 | Status: SHIPPED | OUTPATIENT
Start: 2025-02-26

## 2025-02-26 NOTE — PATIENT INSTRUCTIONS
Medicare Preventive Visit Patient Instructions  Thank you for completing your Welcome to Medicare Visit or Medicare Annual Wellness Visit today. Your next wellness visit will be due in one year (2/27/2026).  The screening/preventive services that you may require over the next 5-10 years are detailed below. Some tests may not apply to you based off risk factors and/or age. Screening tests ordered at today's visit but not completed yet may show as past due. Also, please note that scanned in results may not display below.  Preventive Screenings:  Service Recommendations Previous Testing/Comments   Colorectal Cancer Screening  Colonoscopy    Fecal Occult Blood Test (FOBT)/Fecal Immunochemical Test (FIT)  Fecal DNA/Cologuard Test  Flexible Sigmoidoscopy Age: 45-75 years old   Colonoscopy: every 10 years (May be performed more frequently if at higher risk)  OR  FOBT/FIT: every 1 year  OR  Cologuard: every 3 years  OR  Sigmoidoscopy: every 5 years  Screening may be recommended earlier than age 45 if at higher risk for colorectal cancer. Also, an individualized decision between you and your healthcare provider will decide whether screening between the ages of 76-85 would be appropriate. Colonoscopy: 03/07/2024  FOBT/FIT: Not on file  Cologuard: Not on file  Sigmoidoscopy: Not on file    Screening Current     Prostate Cancer Screening Individualized decision between patient and health care provider in men between ages of 55-69   Medicare will cover every 12 months beginning on the day after your 50th birthday PSA: 7.418 ng/mL     Screening Current     Hepatitis C Screening Once for adults born between 1945 and 1965  More frequently in patients at high risk for Hepatitis C Hep C Antibody: Not on file        Diabetes Screening 1-2 times per year if you're at risk for diabetes or have pre-diabetes Fasting glucose: 108 mg/dL (2/12/2025)  A1C: 5.9 % (2/12/2025)  Screening Current   Cholesterol Screening Once every 5 years if you  don't have a lipid disorder. May order more often based on risk factors. Lipid panel: 02/12/2025  Screening Not Indicated  History Lipid Disorder      Other Preventive Screenings Covered by Medicare:  Abdominal Aortic Aneurysm (AAA) Screening: covered once if your at risk. You're considered to be at risk if you have a family history of AAA or a male between the age of 65-75 who smoking at least 100 cigarettes in your lifetime.  Lung Cancer Screening: covers low dose CT scan once per year if you meet all of the following conditions: (1) Age 55-77; (2) No signs or symptoms of lung cancer; (3) Current smoker or have quit smoking within the last 15 years; (4) You have a tobacco smoking history of at least 20 pack years (packs per day x number of years you smoked); (5) You get a written order from a healthcare provider.  Glaucoma Screening: covered annually if you're considered high risk: (1) You have diabetes OR (2) Family history of glaucoma OR (3)  aged 50 and older OR (4)  American aged 65 and older  Osteoporosis Screening: covered every 2 years if you meet one of the following conditions: (1) Have a vertebral abnormality; (2) On glucocorticoid therapy for more than 3 months; (3) Have primary hyperparathyroidism; (4) On osteoporosis medications and need to assess response to drug therapy.  HIV Screening: covered annually if you're between the age of 15-65. Also covered annually if you are younger than 15 and older than 65 with risk factors for HIV infection. For pregnant patients, it is covered up to 3 times per pregnancy.    Immunizations:  Immunization Recommendations   Influenza Vaccine Annual influenza vaccination during flu season is recommended for all persons aged >= 6 months who do not have contraindications   Pneumococcal Vaccine   * Pneumococcal conjugate vaccine = PCV13 (Prevnar 13), PCV15 (Vaxneuvance), PCV20 (Prevnar 20)  * Pneumococcal polysaccharide vaccine = PPSV23 (Pneumovax)  Adults 19-65 yo with certain risk factors or if 65+ yo  If never received any pneumonia vaccine: recommend Prevnar 20 (PCV20)  Give PCV20 if previously received 1 dose of PCV13 or PPSV23   Hepatitis B Vaccine 3 dose series if at intermediate or high risk (ex: diabetes, end stage renal disease, liver disease)   Respiratory syncytial virus (RSV) Vaccine - COVERED BY MEDICARE PART D  * RSVPreF3 (Arexvy) CDC recommends that adults 60 years of age and older may receive a single dose of RSV vaccine using shared clinical decision-making (SCDM)   Tetanus (Td) Vaccine - COST NOT COVERED BY MEDICARE PART B Following completion of primary series, a booster dose should be given every 10 years to maintain immunity against tetanus. Td may also be given as tetanus wound prophylaxis.   Tdap Vaccine - COST NOT COVERED BY MEDICARE PART B Recommended at least once for all adults. For pregnant patients, recommended with each pregnancy.   Shingles Vaccine (Shingrix) - COST NOT COVERED BY MEDICARE PART B  2 shot series recommended in those 19 years and older who have or will have weakened immune systems or those 50 years and older     Health Maintenance Due:      Topic Date Due   • Hepatitis C Screening  Never done   • Colorectal Cancer Screening  03/07/2029     Immunizations Due:      Topic Date Due   • Pneumococcal Vaccine: 65+ Years (1 of 1 - PCV) Never done   • Influenza Vaccine (1) Never done   • COVID-19 Vaccine (3 - 2024-25 season) 09/01/2024     Advance Directives   What are advance directives?  Advance directives are legal documents that state your wishes and plans for medical care. These plans are made ahead of time in case you lose your ability to make decisions for yourself. Advance directives can apply to any medical decision, such as the treatments you want, and if you want to donate organs.   What are the types of advance directives?  There are many types of advance directives, and each state has rules about how to use  them. You may choose a combination of any of the following:  Living will:  This is a written record of the treatment you want. You can also choose which treatments you do not want, which to limit, and which to stop at a certain time. This includes surgery, medicine, IV fluid, and tube feedings.   Durable power of  for healthcare (DPAHC):  This is a written record that states who you want to make healthcare choices for you when you are unable to make them for yourself. This person, called a proxy, is usually a family member or a friend. You may choose more than 1 proxy.  Do not resuscitate (DNR) order:  A DNR order is used in case your heart stops beating or you stop breathing. It is a request not to have certain forms of treatment, such as CPR. A DNR order may be included in other types of advance directives.  Medical directive:  This covers the care that you want if you are in a coma, near death, or unable to make decisions for yourself. You can list the treatments you want for each condition. Treatment may include pain medicine, surgery, blood transfusions, dialysis, IV or tube feedings, and a ventilator (breathing machine).  Values history:  This document has questions about your views, beliefs, and how you feel and think about life. This information can help others choose the care that you would choose.  Why are advance directives important?  An advance directive helps you control your care. Although spoken wishes may be used, it is better to have your wishes written down. Spoken wishes can be misunderstood, or not followed. Treatments may be given even if you do not want them. An advance directive may make it easier for your family to make difficult choices about your care.   Weight Management   Why it is important to manage your weight:  Being overweight increases your risk of health conditions such as heart disease, high blood pressure, type 2 diabetes, and certain types of cancer. It can also  increase your risk for osteoarthritis, sleep apnea, and other respiratory problems. Aim for a slow, steady weight loss. Even a small amount of weight loss can lower your risk of health problems.  How to lose weight safely:  A safe and healthy way to lose weight is to eat fewer calories and get regular exercise. You can lose up about 1 pound a week by decreasing the number of calories you eat by 500 calories each day.   Healthy meal plan for weight management:  A healthy meal plan includes a variety of foods, contains fewer calories, and helps you stay healthy. A healthy meal plan includes the following:  Eat whole-grain foods more often.  A healthy meal plan should contain fiber. Fiber is the part of grains, fruits, and vegetables that is not broken down by your body. Whole-grain foods are healthy and provide extra fiber in your diet. Some examples of whole-grain foods are whole-wheat breads and pastas, oatmeal, brown rice, and bulgur.  Eat a variety of vegetables every day.  Include dark, leafy greens such as spinach, kale, pj greens, and mustard greens. Eat yellow and orange vegetables such as carrots, sweet potatoes, and winter squash.   Eat a variety of fruits every day.  Choose fresh or canned fruit (canned in its own juice or light syrup) instead of juice. Fruit juice has very little or no fiber.  Eat low-fat dairy foods.  Drink fat-free (skim) milk or 1% milk. Eat fat-free yogurt and low-fat cottage cheese. Try low-fat cheeses such as mozzarella and other reduced-fat cheeses.  Choose meat and other protein foods that are low in fat.  Choose beans or other legumes such as split peas or lentils. Choose fish, skinless poultry (chicken or turkey), or lean cuts of red meat (beef or pork). Before you cook meat or poultry, cut off any visible fat.   Use less fat and oil.  Try baking foods instead of frying them. Add less fat, such as margarine, sour cream, regular salad dressing and mayonnaise to foods. Eat  fewer high-fat foods. Some examples of high-fat foods include french fries, doughnuts, ice cream, and cakes.  Eat fewer sweets.  Limit foods and drinks that are high in sugar. This includes candy, cookies, regular soda, and sweetened drinks.  Exercise:  Exercise at least 30 minutes per day on most days of the week. Some examples of exercise include walking, biking, dancing, and swimming. You can also fit in more physical activity by taking the stairs instead of the elevator or parking farther away from stores. Ask your healthcare provider about the best exercise plan for you.      © Copyright [employer] 2018 Information is for End User's use only and may not be sold, redistributed or otherwise used for commercial purposes. All illustrations and images included in CareNotes® are the copyrighted property of A.[employer].A[employer]M., Inc. or [employer]

## 2025-02-26 NOTE — ASSESSMENT & PLAN NOTE
Chemical work up unremarkable thus far except for elevated renin. Patient has upcoming appointment with endo and has upcoming appointment with urology to discuss surgical options.

## 2025-02-26 NOTE — ASSESSMENT & PLAN NOTE
Hgb A1c of 5.9, discussed diet and lifestyle modifications. Patient has a sugar free diet at this point outside of carbohydrates.

## 2025-02-26 NOTE — ASSESSMENT & PLAN NOTE
Orders:  •  losartan (COZAAR) 100 MG tablet; Take 1 tablet (100 mg total) by mouth in the morning  •  Ambulatory Referral to Cardiology; Future  •  Stress test only, exercise; Future  •  atorvastatin (LIPITOR) 40 mg tablet; Take 1 tablet (40 mg total) by mouth at bedtime

## 2025-02-26 NOTE — TELEPHONE ENCOUNTER
Patient's wife, Jeannine called to clarify the procedure and follow up appointment with Dr. Larson. I was able to answer all of her questions.

## 2025-02-26 NOTE — PROGRESS NOTES
Name: Trent Fonseca      : 1960      MRN: [id number]  Encounter Provider: Sierra Quiroz DO  Encounter Date: 2025   Encounter department: [facility] [address]    Assessment & Plan  Encounter for Medicare annual wellness exam         Prediabetes  Hgb A1c of 5.9, discussed diet and lifestyle modifications. Patient has a sugar free diet at this point outside of carbohydrates.        Primary hypertension    Orders:  •  losartan (COZAAR) 100 MG tablet; Take 1 tablet (100 mg total) by mouth in the morning  •  Ambulatory Referral to Cardiology; Future  •  Stress test only, exercise; Future  •  atorvastatin (LIPITOR) 40 mg tablet; Take 1 tablet (40 mg total) by mouth at bedtime    Adrenal mass greater than 4 cm in diameter (HCC)  Chemical work up unremarkable thus far except for elevated renin. Patient has upcoming appointment with endo and has upcoming appointment with urology to discuss surgical options.        Hyperlipidemia, unspecified hyperlipidemia type    Orders:  •  Ambulatory Referral to Cardiology; Future    High coronary artery calcium score    Orders:  •  Stress test only, exercise; Future    Atherosclerosis of native coronary artery of native heart without angina pectoris    Orders:  •  Ambulatory Referral to Cardiology; Future  •  Stress test only, exercise; Future    Urinary catheter in place           Depression Screening and Follow-up Plan: Patient was screened for depression during today's encounter. They screened negative with a PHQ-2 score of 0.      ASCVD Risk Management:  The 10-year ASCVD risk score (Krzysztof SORTO, et al., 2019) is: 20%    Patent does not have underlying ASCVD. Their ASCVD Risk is high (>= 20%).    Preventive services discussed: diet & exercise.    Medication management: ASA, statin and ARB.  Blood pressure management: Losartan 100 mg daily and HCTZ 25 mg daily.  Cholesterol management: ON Atorvastatin 20 mg daily, will increase  to 40 mg..  Tobacco cessation: N/A.    Preventive health issues were discussed with patient, and age appropriate screening tests were ordered as noted in patient's After Visit Summary. Personalized health advice and appropriate referrals for health education or preventive services given if needed, as noted in patient's After Visit Summary.    History of Present Illness     HPI   Patient Care Team:  Sierra Quiroz DO as PCP - General (Family Medicine)    Notes that he has a urinary catheter in with plan for voiding trial in 1 more week (2 weeks from placement).    Notes that he had an exercise stress test (normal) about 5 years ago after elevated coronary artery CA score.     Review of Systems  Medical History Reviewed by provider this encounter:  Tobacco  Allergies  Meds  Problems  Med Hx  Surg Hx  Fam Hx       Annual Wellness Visit Questionnaire   Trent is here for his Subsequent Wellness visit. Last Medicare Wellness visit information reviewed, patient interviewed, no change since last AWV.     Health Risk Assessment:   Patient rates overall health as very good. Patient feels that their physical health rating is slightly better. Patient is very satisfied with their life. Eyesight was rated as same. Hearing was rated as same. Patient feels that their emotional and mental health rating is slightly better. Patients states they are never, rarely angry. Patient states they are never, rarely unusually tired/fatigued. Pain experienced in the last 7 days has been some. Patient's pain rating has been 2/10. Patient states that he has experienced no weight loss or gain in last 6 months. The pain is due to the catheter!    Depression Screening:   PHQ-2 Score: 0      Fall Risk Screening:   In the past year, patient has experienced: no history of falling in past year      Home Safety:  Patient does not have trouble with stairs inside or outside of their home. Patient has working smoke alarms and has working carbon  monoxide detector. Home safety hazards include: none.     Nutrition:   Current diet is Low Cholesterol, Low Saturated Fat, Low Carb, No Added Salt and Other (please comment). Never use refined sugar. Never fried foods. Lots of vegetables. Seldom red meat.    Medications:   Patient is not currently taking any over-the-counter supplements. Patient is able to manage medications.     Activities of Daily Living (ADLs)/Instrumental Activities of Daily Living (IADLs):   Walk and transfer into and out of bed and chair?: Yes  Dress and groom yourself?: Yes    Bathe or shower yourself?: Yes    Feed yourself? Yes  Do your laundry/housekeeping?: Yes  Manage your money, pay your bills and track your expenses?: Yes  Make your own meals?: Yes    Do your own shopping?: Yes    Previous Hospitalizations:   Any hospitalizations or ED visits within the last 12 months?: Yes    How many hospitalizations have you had in the last year?: 1-2    Hospitalization Comments: Not hospitalized but 1 ER visit    Advance Care Planning:   Living will: Yes    Durable POA for healthcare: Yes    Advanced directive: Yes      PREVENTIVE SCREENINGS      Cardiovascular Screening:    General: Screening Not Indicated and History Lipid Disorder      Diabetes Screening:     General: Screening Current      Colorectal Cancer Screening:     General: Screening Current      Prostate Cancer Screening:    General: Screening Current      Abdominal Aortic Aneurysm (AAA) Screening:    Risk factors include: age between 65-74 yo        Lung Cancer Screening:     General: Screening Not Indicated    Screening, Brief Intervention, and Referral to Treatment (SBIRT)     Screening  Typical number of drinks in a day: 0  Typical number of drinks in a week: 0  Interpretation: Low risk drinking behavior.    AUDIT-C Screenin) How often did you have a drink containing alcohol in the past year? never  2) How many drinks did you have on a typical day when you were drinking in the  "past year? 0  3) How often did you have 6 or more drinks on one occasion in the past year? never    AUDIT-C Score: 0  Interpretation: Score 0-3 (male): Negative screen for alcohol misuse    Single Item Drug Screening:  How often have you used an illegal drug (including marijuana) or a prescription medication for non-medical reasons in the past year? never    Single Item Drug Screen Score: 0  Interpretation: Negative screen for possible drug use disorder    Social Drivers of Health     Food Insecurity: No Food Insecurity (2/24/2025)    Hunger Vital Sign    • Worried About Running Out of Food in the Last Year: Never true    • Ran Out of Food in the Last Year: Never true   Transportation Needs: No Transportation Needs (2/24/2025)    PRAPARE - Transportation    • Lack of Transportation (Medical): No    • Lack of Transportation (Non-Medical): No   Housing Stability: Low Risk  (2/24/2025)    Housing Stability Vital Sign    • Unable to Pay for Housing in the Last Year: No    • Number of Times Moved in the Last Year: 1    • Homeless in the Last Year: No   Utilities: Not At Risk (2/24/2025)    [facility] Utilities    • Threatened with loss of utilities: No     No results found.    Objective   /92 (Patient Position: Sitting, Cuff Size: Standard)   Pulse 90   Temp 97.5 °F (36.4 °C)   Ht 5' 10\" (1.778 m)   Wt 88.9 kg (196 lb)   SpO2 98%   BMI 28.12 kg/m²     Physical Exam  Vitals reviewed.   Constitutional:       General: He is not in acute distress.     Appearance: Normal appearance.   HENT:      Head: Normocephalic and atraumatic.      Right Ear: External ear normal.      Left Ear: External ear normal.      Nose: Nose normal.      Mouth/Throat:      Mouth: Mucous membranes are moist.   Eyes:      Extraocular Movements: Extraocular movements intact.      Conjunctiva/sclera: Conjunctivae normal.   Cardiovascular:      Rate and Rhythm: Normal rate and regular rhythm.      Heart sounds: Normal heart sounds.   Pulmonary:      " Effort: Pulmonary effort is normal.      Breath sounds: Normal breath sounds. No wheezing, rhonchi or rales.   Abdominal:      General: Bowel sounds are normal. There is no distension.      Palpations: Abdomen is soft.      Tenderness: There is no abdominal tenderness.   Musculoskeletal:      Cervical back: Neck supple.      Right lower leg: No edema.      Left lower leg: No edema.   Lymphadenopathy:      Cervical: No cervical adenopathy.   Skin:     General: Skin is warm.      Capillary Refill: Capillary refill takes less than 2 seconds.      Findings: No rash.   Neurological:      Mental Status: He is alert. Mental status is at baseline.           DO Ramos Amezcua [facility]  2/26/2025 8:05 AM

## 2025-03-01 ENCOUNTER — TELEMEDICINE (OUTPATIENT)
Dept: OTHER | Facility: HOSPITAL | Age: 65
End: 2025-03-01
Payer: MEDICARE

## 2025-03-01 ENCOUNTER — NURSE TRIAGE (OUTPATIENT)
Dept: OTHER | Facility: OTHER | Age: 65
End: 2025-03-01

## 2025-03-01 ENCOUNTER — HOSPITAL ENCOUNTER (EMERGENCY)
Facility: HOSPITAL | Age: 65
Discharge: HOME | DRG: 698 | End: 2025-03-01
Attending: EMERGENCY MEDICINE
Payer: MEDICARE

## 2025-03-01 VITALS
OXYGEN SATURATION: 98 % | TEMPERATURE: 97.9 F | SYSTOLIC BLOOD PRESSURE: 179 MMHG | RESPIRATION RATE: 17 BRPM | HEART RATE: 88 BPM | DIASTOLIC BLOOD PRESSURE: 92 MMHG

## 2025-03-01 DIAGNOSIS — N32.89 BLADDER SPASMS: ICD-10-CM

## 2025-03-01 DIAGNOSIS — Z97.8 FOLEY CATHETER PRESENT: Primary | ICD-10-CM

## 2025-03-01 DIAGNOSIS — Z46.6 URINARY CATHETER (FOLEY) CHANGE REQUIRED: ICD-10-CM

## 2025-03-01 DIAGNOSIS — T83.9XXA PROBLEM WITH URINARY CATHETER (HCC): Primary | ICD-10-CM

## 2025-03-01 PROCEDURE — 99283 EMERGENCY DEPT VISIT LOW MDM: CPT

## 2025-03-01 PROCEDURE — 51702 INSERT TEMP BLADDER CATH: CPT | Performed by: EMERGENCY MEDICINE

## 2025-03-01 PROCEDURE — 99213 OFFICE O/P EST LOW 20 MIN: CPT | Performed by: PHYSICIAN ASSISTANT

## 2025-03-01 PROCEDURE — 99284 EMERGENCY DEPT VISIT MOD MDM: CPT | Performed by: EMERGENCY MEDICINE

## 2025-03-01 RX ORDER — LIDOCAINE HYDROCHLORIDE 20 MG/ML
1 JELLY TOPICAL ONCE
Status: COMPLETED | OUTPATIENT
Start: 2025-03-01 | End: 2025-03-01

## 2025-03-01 RX ORDER — OXYBUTYNIN CHLORIDE 5 MG/1
5 TABLET, EXTENDED RELEASE ORAL ONCE
Status: COMPLETED | OUTPATIENT
Start: 2025-03-01 | End: 2025-03-01

## 2025-03-01 RX ADMIN — LIDOCAINE HYDROCHLORIDE 1 APPLICATION: 20 JELLY TOPICAL at 21:52

## 2025-03-01 RX ADMIN — OXYBUTYNIN CHLORIDE 5 MG: 5 TABLET, EXTENDED RELEASE ORAL at 22:45

## 2025-03-02 ENCOUNTER — HOSPITAL ENCOUNTER (INPATIENT)
Facility: HOSPITAL | Age: 65
LOS: 3 days | Discharge: HOME | DRG: 698 | End: 2025-03-06
Attending: EMERGENCY MEDICINE | Admitting: FAMILY MEDICINE
Payer: MEDICARE

## 2025-03-02 DIAGNOSIS — N39.0 URINARY TRACT INFECTION ASSOCIATED WITH INDWELLING URETHRAL CATHETER, INITIAL ENCOUNTER  (HCC): ICD-10-CM

## 2025-03-02 DIAGNOSIS — N39.0 UTI (URINARY TRACT INFECTION): ICD-10-CM

## 2025-03-02 DIAGNOSIS — A41.9 SEPSIS (HCC): Primary | ICD-10-CM

## 2025-03-02 DIAGNOSIS — N40.0 BENIGN PROSTATIC HYPERPLASIA, UNSPECIFIED WHETHER LOWER URINARY TRACT SYMPTOMS PRESENT: ICD-10-CM

## 2025-03-02 DIAGNOSIS — T83.511A URINARY TRACT INFECTION ASSOCIATED WITH INDWELLING URETHRAL CATHETER, INITIAL ENCOUNTER  (HCC): ICD-10-CM

## 2025-03-02 PROBLEM — N30.00 ACUTE CYSTITIS WITHOUT HEMATURIA: Status: ACTIVE | Noted: 2025-03-02

## 2025-03-02 PROBLEM — E87.1 HYPONATREMIA: Status: ACTIVE | Noted: 2025-03-02

## 2025-03-02 LAB
2HR DELTA HS TROPONIN: 2 NG/L
ALBUMIN SERPL BCG-MCNC: 4.4 G/DL (ref 3.5–5)
ALP SERPL-CCNC: 85 U/L (ref 34–104)
ALT SERPL W P-5'-P-CCNC: 17 U/L (ref 7–52)
ANION GAP SERPL CALCULATED.3IONS-SCNC: 10 MMOL/L (ref 4–13)
APTT PPP: 34 SECONDS (ref 23–34)
AST SERPL W P-5'-P-CCNC: 14 U/L (ref 13–39)
BACTERIA UR QL AUTO: ABNORMAL /HPF
BASOPHILS # BLD AUTO: 0.11 THOUSANDS/ÂΜL (ref 0–0.1)
BASOPHILS NFR BLD AUTO: 1 % (ref 0–1)
BILIRUB DIRECT SERPL-MCNC: 0.16 MG/DL (ref 0–0.2)
BILIRUB SERPL-MCNC: 1.82 MG/DL (ref 0.2–1)
BILIRUB UR QL STRIP: NEGATIVE
BUN SERPL-MCNC: 15 MG/DL (ref 5–25)
CALCIUM SERPL-MCNC: 9.4 MG/DL (ref 8.4–10.2)
CARDIAC TROPONIN I PNL SERPL HS: 5 NG/L (ref ?–50)
CARDIAC TROPONIN I PNL SERPL HS: 7 NG/L (ref ?–50)
CHLORIDE SERPL-SCNC: 95 MMOL/L (ref 96–108)
CLARITY UR: ABNORMAL
CO2 SERPL-SCNC: 27 MMOL/L (ref 21–32)
COLOR UR: YELLOW
CREAT SERPL-MCNC: 0.89 MG/DL (ref 0.6–1.3)
EOSINOPHIL # BLD AUTO: 0.05 THOUSAND/ÂΜL (ref 0–0.61)
EOSINOPHIL NFR BLD AUTO: 0 % (ref 0–6)
ERYTHROCYTE [DISTWIDTH] IN BLOOD BY AUTOMATED COUNT: 12.5 % (ref 11.6–15.1)
FLUAV AG UPPER RESP QL IA.RAPID: NEGATIVE
FLUBV AG UPPER RESP QL IA.RAPID: NEGATIVE
GFR SERPL CREATININE-BSD FRML MDRD: 89 ML/MIN/1.73SQ M
GLUCOSE SERPL-MCNC: 116 MG/DL (ref 65–140)
GLUCOSE UR STRIP-MCNC: NEGATIVE MG/DL
HCT VFR BLD AUTO: 42.7 % (ref 36.5–49.3)
HGB BLD-MCNC: 14.5 G/DL (ref 12–17)
HGB UR QL STRIP.AUTO: ABNORMAL
IMM GRANULOCYTES # BLD AUTO: 0.1 THOUSAND/UL (ref 0–0.2)
IMM GRANULOCYTES NFR BLD AUTO: 1 % (ref 0–2)
INR PPP: 0.97 (ref 0.85–1.19)
KETONES UR STRIP-MCNC: ABNORMAL MG/DL
LACTATE SERPL-SCNC: 1.1 MMOL/L (ref 0.5–2)
LEUKOCYTE ESTERASE UR QL STRIP: ABNORMAL
LYMPHOCYTES # BLD AUTO: 0.75 THOUSANDS/ÂΜL (ref 0.6–4.47)
LYMPHOCYTES NFR BLD AUTO: 4 % (ref 14–44)
MCH RBC QN AUTO: 29.8 PG (ref 26.8–34.3)
MCHC RBC AUTO-ENTMCNC: 34 G/DL (ref 31.4–37.4)
MCV RBC AUTO: 88 FL (ref 82–98)
MONOCYTES # BLD AUTO: 1.46 THOUSAND/ÂΜL (ref 0.17–1.22)
MONOCYTES NFR BLD AUTO: 9 % (ref 4–12)
MUCOUS THREADS UR QL AUTO: ABNORMAL
NEUTROPHILS # BLD AUTO: 14.45 THOUSANDS/ÂΜL (ref 1.85–7.62)
NEUTS SEG NFR BLD AUTO: 85 % (ref 43–75)
NITRITE UR QL STRIP: POSITIVE
NON-SQ EPI CELLS URNS QL MICRO: ABNORMAL /HPF
NRBC BLD AUTO-RTO: 0 /100 WBCS
PH UR STRIP.AUTO: 6 [PH]
PLATELET # BLD AUTO: 286 THOUSANDS/UL (ref 149–390)
PMV BLD AUTO: 10 FL (ref 8.9–12.7)
POTASSIUM SERPL-SCNC: 3.5 MMOL/L (ref 3.5–5.3)
PROT SERPL-MCNC: 7.3 G/DL (ref 6.4–8.4)
PROT UR STRIP-MCNC: ABNORMAL MG/DL
PROTHROMBIN TIME: 13.6 SECONDS (ref 12.3–15)
RBC # BLD AUTO: 4.86 MILLION/UL (ref 3.88–5.62)
RBC #/AREA URNS AUTO: ABNORMAL /HPF
SARS-COV+SARS-COV-2 AG RESP QL IA.RAPID: NEGATIVE
SODIUM SERPL-SCNC: 132 MMOL/L (ref 135–147)
SP GR UR STRIP.AUTO: 1.02 (ref 1–1.03)
UROBILINOGEN UR STRIP-ACNC: <2 MG/DL
WBC # BLD AUTO: 16.92 THOUSAND/UL (ref 4.31–10.16)
WBC #/AREA URNS AUTO: ABNORMAL /HPF

## 2025-03-02 PROCEDURE — 99285 EMERGENCY DEPT VISIT HI MDM: CPT | Performed by: EMERGENCY MEDICINE

## 2025-03-02 PROCEDURE — 85610 PROTHROMBIN TIME: CPT | Performed by: EMERGENCY MEDICINE

## 2025-03-02 PROCEDURE — 87186 SC STD MICRODIL/AGAR DIL: CPT | Performed by: EMERGENCY MEDICINE

## 2025-03-02 PROCEDURE — 87086 URINE CULTURE/COLONY COUNT: CPT | Performed by: EMERGENCY MEDICINE

## 2025-03-02 PROCEDURE — 84484 ASSAY OF TROPONIN QUANT: CPT | Performed by: EMERGENCY MEDICINE

## 2025-03-02 PROCEDURE — 80048 BASIC METABOLIC PNL TOTAL CA: CPT | Performed by: EMERGENCY MEDICINE

## 2025-03-02 PROCEDURE — 87040 BLOOD CULTURE FOR BACTERIA: CPT | Performed by: EMERGENCY MEDICINE

## 2025-03-02 PROCEDURE — 81001 URINALYSIS AUTO W/SCOPE: CPT | Performed by: EMERGENCY MEDICINE

## 2025-03-02 PROCEDURE — 85730 THROMBOPLASTIN TIME PARTIAL: CPT | Performed by: EMERGENCY MEDICINE

## 2025-03-02 PROCEDURE — 99285 EMERGENCY DEPT VISIT HI MDM: CPT

## 2025-03-02 PROCEDURE — 87811 SARS-COV-2 COVID19 W/OPTIC: CPT | Performed by: EMERGENCY MEDICINE

## 2025-03-02 PROCEDURE — 83605 ASSAY OF LACTIC ACID: CPT | Performed by: EMERGENCY MEDICINE

## 2025-03-02 PROCEDURE — 85025 COMPLETE CBC W/AUTO DIFF WBC: CPT | Performed by: EMERGENCY MEDICINE

## 2025-03-02 PROCEDURE — 99222 1ST HOSP IP/OBS MODERATE 55: CPT

## 2025-03-02 PROCEDURE — 80076 HEPATIC FUNCTION PANEL: CPT | Performed by: EMERGENCY MEDICINE

## 2025-03-02 PROCEDURE — 36415 COLL VENOUS BLD VENIPUNCTURE: CPT | Performed by: EMERGENCY MEDICINE

## 2025-03-02 PROCEDURE — 96365 THER/PROPH/DIAG IV INF INIT: CPT

## 2025-03-02 PROCEDURE — 87804 INFLUENZA ASSAY W/OPTIC: CPT | Performed by: EMERGENCY MEDICINE

## 2025-03-02 RX ORDER — ENOXAPARIN SODIUM 100 MG/ML
40 INJECTION SUBCUTANEOUS DAILY
Status: DISCONTINUED | OUTPATIENT
Start: 2025-03-03 | End: 2025-03-06 | Stop reason: HOSPADM

## 2025-03-02 RX ORDER — ACETAMINOPHEN 325 MG/1
650 TABLET ORAL EVERY 6 HOURS PRN
Status: DISCONTINUED | OUTPATIENT
Start: 2025-03-02 | End: 2025-03-06 | Stop reason: HOSPADM

## 2025-03-02 RX ORDER — LOSARTAN POTASSIUM 50 MG/1
100 TABLET ORAL DAILY
Status: DISCONTINUED | OUTPATIENT
Start: 2025-03-03 | End: 2025-03-06 | Stop reason: HOSPADM

## 2025-03-02 RX ORDER — ONDANSETRON 2 MG/ML
4 INJECTION INTRAMUSCULAR; INTRAVENOUS ONCE
Status: COMPLETED | OUTPATIENT
Start: 2025-03-02 | End: 2025-03-02

## 2025-03-02 RX ORDER — ATORVASTATIN CALCIUM 40 MG/1
40 TABLET, FILM COATED ORAL
Status: DISCONTINUED | OUTPATIENT
Start: 2025-03-02 | End: 2025-03-06 | Stop reason: HOSPADM

## 2025-03-02 RX ORDER — SODIUM CHLORIDE 9 MG/ML
75 INJECTION, SOLUTION INTRAVENOUS CONTINUOUS
Status: DISCONTINUED | OUTPATIENT
Start: 2025-03-02 | End: 2025-03-04

## 2025-03-02 RX ORDER — TAMSULOSIN HYDROCHLORIDE 0.4 MG/1
0.4 CAPSULE ORAL
Status: DISCONTINUED | OUTPATIENT
Start: 2025-03-02 | End: 2025-03-06 | Stop reason: HOSPADM

## 2025-03-02 RX ORDER — HYDROCHLOROTHIAZIDE 25 MG/1
25 TABLET ORAL EVERY MORNING
Status: DISCONTINUED | OUTPATIENT
Start: 2025-03-03 | End: 2025-03-06 | Stop reason: HOSPADM

## 2025-03-02 RX ORDER — IBUPROFEN 600 MG/1
600 TABLET, FILM COATED ORAL ONCE
Status: COMPLETED | OUTPATIENT
Start: 2025-03-02 | End: 2025-03-02

## 2025-03-02 RX ADMIN — SODIUM CHLORIDE 75 ML/HR: 0.9 INJECTION, SOLUTION INTRAVENOUS at 19:45

## 2025-03-02 RX ADMIN — SODIUM CHLORIDE 1000 ML: 0.9 INJECTION, SOLUTION INTRAVENOUS at 17:45

## 2025-03-02 RX ADMIN — IBUPROFEN 600 MG: 600 TABLET, FILM COATED ORAL at 22:28

## 2025-03-02 RX ADMIN — ACETAMINOPHEN 650 MG: 325 TABLET, FILM COATED ORAL at 21:18

## 2025-03-02 RX ADMIN — CEFTRIAXONE SODIUM 1000 MG: 10 INJECTION, POWDER, FOR SOLUTION INTRAVENOUS at 17:53

## 2025-03-02 RX ADMIN — ONDANSETRON 4 MG: 2 INJECTION INTRAMUSCULAR; INTRAVENOUS at 22:28

## 2025-03-02 RX ADMIN — SODIUM CHLORIDE 1000 ML: 0.9 INJECTION, SOLUTION INTRAVENOUS at 19:01

## 2025-03-02 RX ADMIN — ATORVASTATIN CALCIUM 40 MG: 40 TABLET, FILM COATED ORAL at 19:42

## 2025-03-02 RX ADMIN — TAMSULOSIN HYDROCHLORIDE 0.4 MG: 0.4 CAPSULE ORAL at 19:42

## 2025-03-02 NOTE — TELEPHONE ENCOUNTER
"Rich placed 2/21 when he failed a voiding trial. Called today with one episode of leakage of urine around the rich. No pain. no bleeding. no discomfort. Urine clear. Currently only on flomax. Denies pain. Urine still also draining into rich. Advised likely bladder spasm, would reach out to on call urology for further advice possible meds.    Relayed to urology NP Valerie Mendoza who advised likely bladder spasm and sent in ditropan with advice to hold 48 hours to voiding trial due to risk of retention.    Called patient back to relay this information- they en route to ED. Not reassured by advice and would still like to go to ED. They are going to [facility]. Please f/u with patient Monday.    Reason for Disposition   Leakage of urine around catheter    Answer Assessment - Initial Assessment Questions  1. SYMPTOMS: \"What symptoms are you concerned about?\"      Urine leakage around the rich    2. ONSET:  \"When did the symptoms start?\"      Today    3. FEVER: \"Is there a fever?\" If Yes, ask: \"What is the temperature, how was it measured, and when did it start?\"      No    4. ABDOMEN PAIN: \"Is there any abdomen pain?\" (Scale 1-10; or mild, moderate, severe)      No    5. URINE COLOR: \"What color is the urine?\"  \"Is there blood present in the urine?\" (e.g., clear, yellow, cloudy, tea-colored, blood streaks, bright red)      Clear yellow urine    6. URINE AMOUNT: \"When did you last empty the urine from the collection bag?\" \"How much urine was in the bag at that time?\" How much urine is in the collection bag now?\"      Just now, good amount    7. INSERTION: \"How long have you (they) had the catheter?\"      Spaced 2/21    8. OTHER SYMPTOMS: \"Are there any other symptoms?\" (e.g., abdomen swelling, back pain, bladder spasms, constipation, foul smelling urine, leaking of urine)       No    9. MEDICINES: \"Are you taking any medicines to treat urinary problems?\" (e.g., antibiotics for a urinary tract infection, medicines " "to treat bladder spasms)       *No Answer*  10. PREGNANCY: \"Is there any chance you are pregnant?\" \"When was your last menstrual period?\"        *No Answer*    Protocols used: Urinary Catheter (e.g., Bella) Symptoms and Questions-Adult-AH    "

## 2025-03-02 NOTE — ED PROVIDER NOTES
ED Disposition       None          Assessment & Plan       Medical Decision Making  This is a 64-year-old male who presents here today with leakage around his Bella catheter.  He says he initially had problems with retention on 2/9, and had a Bella catheter placed.  He followed up with urologist on 2/21, had the Bella catheter removed, and was unable to void, so says it was replaced.  Since then he has had issues where he feels like he needs to urinate, despite normal flow of urine in the bag.  Today he had 2 episodes where he felt like he needed to urinate, and leaked around the Bella.  He has had expected amount of urine output into the Bella catheter since the first episode, and has continued to drain since the second.  He denies any pain in the penis or feeling like it got dislodged.  He denies any rapid change in the amount of Bella output.  He says since it has been placed at this time he will occasionally get a small amount of stringy pink-tinged output, but denies any gross blood or blood clots.  He has no sediment, no periods feeling where it gets clogged.  He denies any pain from it.  When it was placed initially he was not ever having the sensation of needing to urinate at all, and had no leakage.    ROS: Otherwise negative, unless stated as above.    He is well-appearing, no acute distress.  Exam is unremarkable.  He is likely having bladder spasms, as he has had sensation of needing to urinate intermittently the whole time the catheter has been in.  Recent leakage may be due to obstruction in the tubing, smaller size and currently than initially, tube dislodgment or balloon deflation.  As current tube is a size smaller than what was placed initially, we will increase the size to decrease leakage.  He did have oxybutynin prescribed by his urologist over the phone when he called to the office with his symptoms, and he was advised to take this to help prevent bladder spasms.  I discussed with patient and  wife continued management of symptoms at home, following up with the urologist for further management, and indications for return, and they expressed understanding with this plan.    Problems Addressed:  Bladder spasms: acute illness or injury  Problem with urinary catheter (HCC): acute illness or injury  Urinary catheter (Rich) change required: acute illness or injury    Amount and/or Complexity of Data Reviewed  Independent Historian: spouse  External Data Reviewed: notes.    Risk  Prescription drug management.             Medications   lidocaine (URO-JET) 2 % urethral/mucosal gel 1 Application (1 Application Urethral Given 3/1/25 2152)       ED Risk Strat Scores                            SBIRT 22yo+      Flowsheet Row Most Recent Value   Initial Alcohol Screen: US AUDIT-C     1. How often do you have a drink containing alcohol? 0 Filed at: 03/01/2025 2106   2. How many drinks containing alcohol do you have on a typical day you are drinking?  0 Filed at: 03/01/2025 2106   3b. FEMALE Any Age, or MALE 65+: How often do you have 4 or more drinks on one occassion? 0 Filed at: 03/01/2025 2106   Audit-C Score 0 Filed at: 03/01/2025 2106   NARINDER: How many times in the past year have you...    Used an illegal drug or used a prescription medication for non-medical reasons? Never Filed at: 03/01/2025 2106                            History of Present Illness       Chief Complaint   Patient presents with    Urinary Catheter Problem     Pt states his urine is leaking around his rich catheter that started happening today. Catheter was placed on the 21st by urology.        History reviewed. No pertinent past medical history.   Past Surgical History:   Procedure Laterality Date    APPENDECTOMY  2012    COLON SURGERY      2011,2018,2024      Family History   Problem Relation Age of Onset    Heart disease Father     Heart disease Paternal Grandmother       Social History     Tobacco Use    Smoking status: Never    Smokeless  tobacco: Never   Vaping Use    Vaping status: Never Used   Substance Use Topics    Alcohol use: Not Currently    Drug use: Not Currently      E-Cigarette/Vaping    E-Cigarette Use Never User       E-Cigarette/Vaping Substances    Nicotine No     THC No     CBD No     Flavoring No     Other No     Unknown No       I have reviewed and agree with the history as documented.       Urinary Catheter Problem      Review of Systems        Objective       ED Triage Vitals [03/01/25 2101]   Temperature Pulse Blood Pressure Respirations SpO2 Patient Position - Orthostatic VS   97.9 °F (36.6 °C) 101 (!) 194/95 17 98 % Sitting      Temp Source Heart Rate Source BP Location FiO2 (%) Pain Score    Temporal Monitor Left arm -- --      Vitals      Date and Time Temp Pulse SpO2 Resp BP Pain Score FACES Pain Rating User   03/01/25 2101 97.9 °F (36.6 °C) 101 98 % 17 194/95 -- -- NO            Physical Exam  Vitals and nursing note reviewed. Exam conducted with a chaperone present (wife in room).   Constitutional:       General: He is not in acute distress.     Appearance: He is well-developed.      Comments: Hypertensive, improves slightly with repeat   HENT:      Head: Normocephalic and atraumatic.   Eyes:      Conjunctiva/sclera: Conjunctivae normal.      Pupils: Pupils are equal, round, and reactive to light.   Neck:      Trachea: No tracheal deviation.   Cardiovascular:      Rate and Rhythm: Normal rate and regular rhythm.      Pulses:           Radial pulses are 2+ on the right side.   Pulmonary:      Effort: Pulmonary effort is normal. No respiratory distress.   Abdominal:      General: There is no distension.      Palpations: Abdomen is soft.      Tenderness: There is no abdominal tenderness.   Genitourinary:     Comments: Bella in place, clear yellow urine in bag. No erythema, drainage, discharge around tip of penis  Musculoskeletal:      Cervical back: Normal range of motion.   Skin:     General: Skin is warm and dry.    Neurological:      Mental Status: He is alert and oriented to person, place, and time.      GCS: GCS eye subscore is 4. GCS verbal subscore is 5. GCS motor subscore is 6.   Psychiatric:         Mood and Affect: Mood and affect normal.         Behavior: Behavior normal.         Results Reviewed       None            No orders to display       Procedures    ED Medication and Procedure Management   Prior to Admission Medications   Prescriptions Last Dose Informant Patient Reported? Taking?   aspirin 81 mg chewable tablet  Self, Spouse/Significant Other Yes No   Sig: Chew 81 mg daily   atorvastatin (LIPITOR) 40 mg tablet   No No   Sig: Take 1 tablet (40 mg total) by mouth at bedtime   hydroCHLOROthiazide 25 mg tablet  Self, Spouse/Significant Other No No   Sig: Take 1 tablet (25 mg total) by mouth every morning   losartan (COZAAR) 100 MG tablet   No No   Sig: Take 1 tablet (100 mg total) by mouth in the morning   oxybutynin (DITROPAN-XL) 5 mg 24 hr tablet   No No   Sig: Take 1 tablet (5 mg total) by mouth daily as needed (bladder spasms) Hold 48 hours prior to void trial/rich removal.   tamsulosin (FLOMAX) 0.4 mg   No No   Sig: TAKE 1 CAPSULE(0.4 MG) BY MOUTH DAILY WITH DINNER      Facility-Administered Medications: None     Patient's Medications   Discharge Prescriptions    No medications on file     No discharge procedures on file.  ED SEPSIS DOCUMENTATION            Serina Bejarano MD  03/01/25 5175

## 2025-03-02 NOTE — H&P
H&P - Hospitalist   Name: Trent Fonseca 64 y.o. male I MRN: [id number]  Unit/Bed#: ED 10 I Date of Admission: 3/2/2025   Date of Service: 3/2/2025 I Hospital Day: 0     Assessment & Plan  Sepsis without acute organ dysfunction (HCC)  Patient presentation due to fever at home, tachycardia 110, elevated WBC 16k  Suspected to be due to urinary tract infection in the setting of indwelling urinary catheter .    UA with positive nitrates,, leukocytes, innumerable WBC and RBC.  Pending blood culture and urine culture  Will start treatment with empirical IV ceftriaxone while awaiting culture.  Monitor fever curve and CBC    Acute cystitis without hematuria  Acute cystitis in the setting on indwelling of urinary catheter,  Abnormal UA.  Patient recently placed Bella catheter due to BPH on February 9th.  Patient reports that yesterday after Bella replacement due to leakage he noticed overnight some blood in the urine however as of this morning the urine cleared.  Patient would prefer to hold aspirin for now.  Can resume on discharge.  Start treating empirically with IV ceftriaxone  No previous urine cultures.      Benign prostate hyperplasia  History of BPH status post Bella placement due to urinary retention.  Continue with the Bella catheter  Follow-up with urology  Hyponatremia  Sodium 132 on admission felt to be in the setting of sepsis.  Given 2 L bolus of IV fluids  Continue with IV hydration  Reassess BMP in the a.m.      VTE Pharmacologic Prophylaxis: VTE Score: 4 Moderate Risk (Score 3-4) - Pharmacological DVT Prophylaxis Ordered: enoxaparin (Lovenox).  Code Status: Level 1 - Full Code patient  Discussion with family: Updated  (wife) at bedside.    Anticipated Length of Stay: Patient will be admitted on an observation basis with an anticipated length of stay of less than 2 midnights secondary to sepsis in the setting of UTI.    History of Present Illness   Chief Complaint: Fever,    Trent  Raymundo is a 64 y.o. male with a PMH of BPH hyperlipidemia, prediabetes, hypertension who presents with fever.  Patient with history of urinary retention in the setting of BPH that required placement of a Bella on February 10.  Patient went to the urology office on February 20 for  voiding trial however patient failed it and the Bella was replaced.  He was doing fine with the Bella however on March 1 (yesterday) patient started to leak around the Bella and went to the ED where his Bella was exchanged.  Patient noticed some blood in the urine after Bella exchang overnight.in the a.m. the urine was clear however about 4 PM today patient developed fever 101 and 1 episode of vomiting .In the ED patient was meeting sepsis criteria.  Given abnormal urinalysis admitted for observation and sepsis workup.  Patient started treatment with empirical IV antibiotics    Review of Systems   Constitutional:  Positive for fever. Negative for chills.   HENT:  Negative for ear pain and sore throat.    Eyes:  Negative for pain and visual disturbance.   Respiratory:  Negative for cough and shortness of breath.    Cardiovascular:  Negative for chest pain and palpitations.   Gastrointestinal:  Negative for abdominal pain and vomiting.   Genitourinary:  Positive for hematuria (Currently resolved). Negative for dysuria.   Musculoskeletal:  Negative for arthralgias and back pain.   Skin:  Negative for color change and rash.   Neurological:  Negative for seizures and syncope.   All other systems reviewed and are negative.      Historical Information   History reviewed. No pertinent past medical history.  Past Surgical History:   Procedure Laterality Date    APPENDECTOMY  2012    COLON SURGERY      2011,2018,2024     Social History     Tobacco Use    Smoking status: Never    Smokeless tobacco: Never   Vaping Use    Vaping status: Never Used   Substance and Sexual Activity    Alcohol use: Not Currently    Drug use: Not Currently    Sexual  activity: Not Currently     Partners: Female     E-Cigarette/Vaping    E-Cigarette Use Never User      E-Cigarette/Vaping Substances    Nicotine No     THC No     CBD No     Flavoring No     Other No     Unknown No        Social History:  Marital Status: /Civil Union   Occupation:   Patient Pre-hospital Living Situation: Home  Patient Pre-hospital Level of Mobility: walks  Patient Pre-hospital Diet Restrictions:     Meds/Allergies   I have reviewed home medications with patient personally.  Prior to Admission medications    Medication Sig Start Date End Date Taking? Authorizing Provider   aspirin 81 mg chewable tablet Chew 81 mg daily    Historical Provider, MD   atorvastatin (LIPITOR) 40 mg tablet Take 1 tablet (40 mg total) by mouth at bedtime 2/26/25   Sierra Quiroz, DO   hydroCHLOROthiazide 25 mg tablet Take 1 tablet (25 mg total) by mouth every morning 11/19/24   Sierra Quiroz, DO   losartan (COZAAR) 100 MG tablet Take 1 tablet (100 mg total) by mouth in the morning 2/26/25   Sierra Quiroz, DO   oxybutynin (DITROPAN-XL) 5 mg 24 hr tablet Take 1 tablet (5 mg total) by mouth daily as needed (bladder spasms) Hold 48 hours prior to void trial/rich removal. 3/1/25   Key Mendoza PA-C   tamsulosin (FLOMAX) 0.4 mg TAKE 1 CAPSULE(0.4 MG) BY MOUTH DAILY WITH DINNER 2/21/25   Emy Low PA-C     No Known Allergies    Objective :  Temp:  [98.2 °F (36.8 °C)] 98.2 °F (36.8 °C)  HR:  [104-111] 104  BP: (137-154)/(67-75) 148/67  Resp:  [18-20] 20  SpO2:  [90 %-95 %] 93 %  O2 Device: None (Room air)    Physical Exam  Vitals and nursing note reviewed.   Constitutional:       General: He is not in acute distress.     Appearance: He is well-developed.   HENT:      Head: Normocephalic and atraumatic.   Eyes:      Conjunctiva/sclera: Conjunctivae normal.   Cardiovascular:      Rate and Rhythm: Normal rate and regular rhythm.      Heart sounds: No murmur heard.  Pulmonary:      Effort: Pulmonary effort is normal.  No respiratory distress.      Breath sounds: Normal breath sounds.   Abdominal:      General: Bowel sounds are normal.      Palpations: Abdomen is soft.      Tenderness: There is no abdominal tenderness. There is no right CVA tenderness or left CVA tenderness.   Musculoskeletal:         General: No swelling.      Cervical back: Neck supple.      Right lower leg: No edema.      Left lower leg: No edema.   Skin:     General: Skin is warm and dry.      Capillary Refill: Capillary refill takes less than 2 seconds.   Neurological:      Mental Status: He is alert. Mental status is at baseline.   Psychiatric:         Mood and Affect: Mood normal.          Lines/Drains:    Urinary Catheter:  Goal for removal: N/A - Chronic Bella               Lab Results: I have reviewed the following results:  Results from last 7 days   Lab Units 03/02/25  1742   WBC Thousand/uL 16.92*   HEMOGLOBIN g/dL 14.5   HEMATOCRIT % 42.7   PLATELETS Thousands/uL 286   SEGS PCT % 85*   LYMPHO PCT % 4*   MONO PCT % 9   EOS PCT % 0     Results from last 7 days   Lab Units 03/02/25  1742   SODIUM mmol/L 132*   POTASSIUM mmol/L 3.5   CHLORIDE mmol/L 95*   CO2 mmol/L 27   BUN mg/dL 15   CREATININE mg/dL 0.89   ANION GAP mmol/L 10   CALCIUM mg/dL 9.4   ALBUMIN g/dL 4.4   TOTAL BILIRUBIN mg/dL 1.82*   ALK PHOS U/L 85   ALT U/L 17   AST U/L 14   GLUCOSE RANDOM mg/dL 116     Results from last 7 days   Lab Units 03/02/25  1742   INR  0.97         Lab Results   Component Value Date    HGBA1C 5.9 (H) 02/12/2025     Results from last 7 days   Lab Units 03/02/25  1742   LACTIC ACID mmol/L 1.1       Imaging Results Review: No pertinent imaging studies reviewed.  Other Study Results Review: No additional pertinent studies reviewed.    Administrative Statements       ** Please Note: This note has been constructed using a voice recognition system. **

## 2025-03-02 NOTE — ASSESSMENT & PLAN NOTE
History of BPH status post Bella placement due to urinary retention.  Continue with the Bella catheter  Follow-up with urology

## 2025-03-02 NOTE — ASSESSMENT & PLAN NOTE
Patient presentation due to fever at home, tachycardia 110, elevated WBC 16k  Suspected to be due to urinary tract infection in the setting of indwelling urinary catheter .    UA with positive nitrates,, leukocytes, innumerable WBC and RBC.  Pending blood culture and urine culture  Will start treatment with empirical IV ceftriaxone while awaiting culture.  Monitor fever curve and CBC

## 2025-03-02 NOTE — PROGRESS NOTES
Virtual Regular VisitName: Trent Fonseca      : 1960      MRN: [id number]  Encounter Provider: Emy Huitron PA-C  Encounter Date: 3/1/2025   Encounter department: VIRTUAL CARE   :  Assessment & Plan  Rich catheter present         Recommend calling oncall urologist or going to ER to have rich checked     History of Present Illness     Patient states that he has a rich catheter. It is the second time it has been put in for an enlarged prostate. He has apts next week with urology.  He couldn't pee at all and had a catheter put in.   Just now he was emptying his rich bag and felt like he still needed to go and still will feel the need to urinate and the urine came out the penis around the catheter.  This rich has been in since the . He denies any fevers, cloudy urine, no blood in the urine.        Review of Systems   Constitutional: Negative.    HENT: Negative.     Respiratory: Negative.     Gastrointestinal: Negative.    Genitourinary:  Negative for dysuria.        Urine leaking out of rich    Musculoskeletal: Negative.    Neurological: Negative.        Objective   There were no vitals taken for this visit.    Physical Exam  Constitutional:       General: He is not in acute distress.     Appearance: Normal appearance. He is not ill-appearing, toxic-appearing or diaphoretic.   HENT:      Head: Normocephalic and atraumatic.   Pulmonary:      Effort: Pulmonary effort is normal.   Skin:     General: Skin is dry.   Neurological:      General: No focal deficit present.      Mental Status: He is alert and oriented to person, place, and time.   Psychiatric:         Mood and Affect: Mood normal.         Behavior: Behavior normal.         Administrative Statements   Encounter provider Emy Huitron PA-C    The Patient is located at Home and in the following state in which I hold an active license [place].    The patient was identified by name and date of birth. Trent ChurchillJimichirag was informed that this  is a telemedicine visit and that the visit is being conducted through the Epic Embedded platform. He agrees to proceed..  My office door was closed. No one else was in the room.  He acknowledged consent and understanding of privacy and security of the video platform. The patient has agreed to participate and understands they can discontinue the visit at any time.    I have spent a total time of 5 minutes in caring for this patient on the day of the visit/encounter including Documenting in the medical record, Reviewing/placing orders in the medical record (including tests, medications, and/or procedures), and Obtaining or reviewing history  .

## 2025-03-02 NOTE — ASSESSMENT & PLAN NOTE
Acute cystitis in the setting on indwelling of urinary catheter,  Abnormal UA.  Patient recently placed Bella catheter due to BPH on February 9th.  Patient reports that yesterday after Belal replacement due to leakage he noticed overnight some blood in the urine however as of this morning the urine cleared.  Patient would prefer to hold aspirin for now.  Can resume on discharge.  Start treating empirically with IV ceftriaxone  No previous urine cultures.

## 2025-03-02 NOTE — DISCHARGE INSTRUCTIONS
The urologist had prescribed you oxybutynin, which you can take as needed for bladder spasms, which can sometimes present as the frequent sensation of needing to urinate despite drainage from the Bella.  Follow-up with urology for further evaluation.  Return if the catheter stops working, or for any other concerns.

## 2025-03-03 LAB
ANION GAP SERPL CALCULATED.3IONS-SCNC: 8 MMOL/L (ref 4–13)
BUN SERPL-MCNC: 14 MG/DL (ref 5–25)
CALCIUM SERPL-MCNC: 8.9 MG/DL (ref 8.4–10.2)
CHLORIDE SERPL-SCNC: 100 MMOL/L (ref 96–108)
CO2 SERPL-SCNC: 28 MMOL/L (ref 21–32)
CREAT SERPL-MCNC: 1.03 MG/DL (ref 0.6–1.3)
ERYTHROCYTE [DISTWIDTH] IN BLOOD BY AUTOMATED COUNT: 12.8 % (ref 11.6–15.1)
GFR SERPL CREATININE-BSD FRML MDRD: 75 ML/MIN/1.73SQ M
GLUCOSE SERPL-MCNC: 108 MG/DL (ref 65–140)
HCT VFR BLD AUTO: 41.1 % (ref 36.5–49.3)
HGB BLD-MCNC: 13.6 G/DL (ref 12–17)
MCH RBC QN AUTO: 29.4 PG (ref 26.8–34.3)
MCHC RBC AUTO-ENTMCNC: 33.1 G/DL (ref 31.4–37.4)
MCV RBC AUTO: 89 FL (ref 82–98)
PLATELET # BLD AUTO: 222 THOUSANDS/UL (ref 149–390)
PMV BLD AUTO: 9.7 FL (ref 8.9–12.7)
POTASSIUM SERPL-SCNC: 3.4 MMOL/L (ref 3.5–5.3)
RBC # BLD AUTO: 4.62 MILLION/UL (ref 3.88–5.62)
SODIUM SERPL-SCNC: 136 MMOL/L (ref 135–147)
WBC # BLD AUTO: 13.44 THOUSAND/UL (ref 4.31–10.16)

## 2025-03-03 PROCEDURE — 80048 BASIC METABOLIC PNL TOTAL CA: CPT

## 2025-03-03 PROCEDURE — 85027 COMPLETE CBC AUTOMATED: CPT

## 2025-03-03 PROCEDURE — 36415 COLL VENOUS BLD VENIPUNCTURE: CPT

## 2025-03-03 PROCEDURE — 99232 SBSQ HOSP IP/OBS MODERATE 35: CPT

## 2025-03-03 RX ADMIN — TAMSULOSIN HYDROCHLORIDE 0.4 MG: 0.4 CAPSULE ORAL at 17:11

## 2025-03-03 RX ADMIN — ENOXAPARIN SODIUM 40 MG: 40 INJECTION SUBCUTANEOUS at 08:27

## 2025-03-03 RX ADMIN — HYDROCHLOROTHIAZIDE 25 MG: 25 TABLET ORAL at 08:26

## 2025-03-03 RX ADMIN — CEFTRIAXONE SODIUM 1000 MG: 10 INJECTION, POWDER, FOR SOLUTION INTRAVENOUS at 18:07

## 2025-03-03 RX ADMIN — ACETAMINOPHEN 650 MG: 325 TABLET, FILM COATED ORAL at 03:00

## 2025-03-03 RX ADMIN — ATORVASTATIN CALCIUM 40 MG: 40 TABLET, FILM COATED ORAL at 21:06

## 2025-03-03 RX ADMIN — LOSARTAN POTASSIUM 100 MG: 50 TABLET, FILM COATED ORAL at 08:27

## 2025-03-03 RX ADMIN — ACETAMINOPHEN 650 MG: 325 TABLET, FILM COATED ORAL at 17:11

## 2025-03-03 NOTE — ASSESSMENT & PLAN NOTE
Abnormal UA.  Patient recently placed Bella catheter due to BPH on February 9th.  Patient reports that yesterday after Bella replacement due to leakage he noticed overnight some blood in the urine however as of this morning the urine cleared.  Patient would prefer to hold aspirin for now.  Can resume on discharge.  Start treating empirically with IV ceftriaxone  No previous urine cultures.

## 2025-03-03 NOTE — PLAN OF CARE
Problem: INFECTION - ADULT  Goal: Absence or prevention of progression during hospitalization  Description: INTERVENTIONS:  - Assess and monitor for signs and symptoms of infection  - Monitor lab/diagnostic results  - Monitor all insertion sites, i.e. indwelling lines, tubes, and drains  - Monitor endotracheal if appropriate and nasal secretions for changes in amount and color  - [place] appropriate cooling/warming therapies per order  - Administer medications as ordered  - Instruct and encourage patient and family to use good hand hygiene technique  - Identify and instruct in appropriate isolation precautions for identified infection/condition  Outcome: Progressing

## 2025-03-03 NOTE — ASSESSMENT & PLAN NOTE
Sodium 132 on admission felt to be in the setting of sepsis.,  Improved to 136 after IV fluid bolus  Given 2 L bolus of IV fluids  Continue with IV hydration  Reassess BMP in the a.m.

## 2025-03-03 NOTE — ED NOTES
Pt given clean blankets/ sheets/pillow case. Pt resting with wife at bedside.        Ella Ruby RN  03/03/25 1298

## 2025-03-03 NOTE — ASSESSMENT & PLAN NOTE
Patient presentation due to fever at home, tachycardia 110, elevated WBC 16k--improved to 13.4 K  Suspected this was to be urinary tract infection.  UA with positive nitrates,, leukocytes, innumerable WBC and RBC.  Lactic acid 1.1, Pro-Brian not obtained   pending blood cultures and urine culture  Will start treatment with empirical IV ceftriaxone while awaiting culture.  Patient reports feeling significantly improved over last 24 hours and is no longer having chills, as patient is a chronic Bella user, will await for urine culture to ensure appropriate antibiotic use  Monitor fever curve and CBC

## 2025-03-03 NOTE — ED PROVIDER NOTES
Time reflects when diagnosis was documented in both MDM as applicable and the Disposition within this note       Time User Action Codes Description Comment    3/2/2025  6:46 PM Carlson Delfino Add [A41.9] Sepsis (HCC)     3/2/2025  6:46 PM Carlson Delfino Add [N39.0] UTI (urinary tract infection)           ED Disposition       ED Disposition   Admit    Condition   Stable    Date/Time   Sun Mar 2, 2025  6:46 PM    Comment   Case was discussed with Dr Gibbs and the patient's admission status was agreed to be Admission Status: observation status to the service of Dr. Gibbs .               Assessment & Plan       Medical Decision Making  Problems Addressed:  Sepsis (HCC): complicated acute illness or injury with systemic symptoms that poses a threat to life or bodily functions    Amount and/or Complexity of Data Reviewed  Labs: ordered. Decision-making details documented in ED Course.    Risk  Decision regarding hospitalization.             Medications   sodium chloride 0.9 % bolus 1,000 mL (1,000 mL Intravenous New Bag 3/2/25 1901)   sodium chloride 0.9 % bolus 1,000 mL (0 mL Intravenous Stopped 3/2/25 1900)   ceftriaxone (ROCEPHIN) 1 g/50 mL in dextrose IVPB (0 mg Intravenous Stopped 3/2/25 1825)       ED Risk Strat Scores                            SBIRT 22yo+      Flowsheet Row Most Recent Value   Initial Alcohol Screen: US AUDIT-C     1. How often do you have a drink containing alcohol? 0 Filed at: 03/02/2025 1820   2. How many drinks containing alcohol do you have on a typical day you are drinking?  0 Filed at: 03/02/2025 1820   3a. Male UNDER 65: How often do you have five or more drinks on one occasion? 0 Filed at: 03/02/2025 1820   Audit-C Score 0 Filed at: 03/02/2025 1820   NARINDER: How many times in the past year have you...    Used an illegal drug or used a prescription medication for non-medical reasons? Never Filed at: 03/02/2025 1820                            History of Present Illness       Chief Complaint    Patient presents with    Fever     Pt reports recent visit for catheter change last night, in which he had blood drainage that has since subsided. Fevers and abdominal pain since this morning. Last dose of tylenol given 2hr PTA Max temp 101.2       History reviewed. No pertinent past medical history.   Past Surgical History:   Procedure Laterality Date    APPENDECTOMY  2012    COLON SURGERY      2011,2018,2024      Family History   Problem Relation Age of Onset    Heart disease Father     Heart disease Paternal Grandmother       Social History     Tobacco Use    Smoking status: Never    Smokeless tobacco: Never   Vaping Use    Vaping status: Never Used   Substance Use Topics    Alcohol use: Not Currently    Drug use: Not Currently      E-Cigarette/Vaping    E-Cigarette Use Never User       E-Cigarette/Vaping Substances    Nicotine No     THC No     CBD No     Flavoring No     Other No     Unknown No       I have reviewed and agree with the history as documented.     65 yo male with one day of fever, chills, fatigue, generalized weakness and suprapubic abdominal discomfort. Occurs in context of recently placed rich catheter. No cp or sob. No anorexia. No vomiting. No focal weakness. No headache, neck pain or stiffness.         Review of Systems   Constitutional:  Positive for fever.   Gastrointestinal:  Positive for abdominal pain.           Objective       ED Triage Vitals [03/02/25 1722]   Temperature Pulse Blood Pressure Respirations SpO2 Patient Position - Orthostatic VS   98.2 °F (36.8 °C) (!) 111 137/72 18 95 % Sitting      Temp Source Heart Rate Source BP Location FiO2 (%) Pain Score    Oral Monitor Left arm -- --      Vitals      Date and Time Temp Pulse SpO2 Resp BP Pain Score FACES Pain Rating User   03/02/25 1800 98.2 °F (36.8 °C) 109 90 % 20 154/75 -- -- CA   03/02/25 1722 98.2 °F (36.8 °C) 111 95 % 18 137/72 -- -- AS            Physical Exam  Vitals and nursing note reviewed.   Constitutional:        General: He is not in acute distress.     Appearance: He is well-developed. He is not ill-appearing, toxic-appearing or diaphoretic.   HENT:      Head: Normocephalic and atraumatic.      Mouth/Throat:      Mouth: Mucous membranes are moist.      Pharynx: Oropharynx is clear.   Eyes:      Conjunctiva/sclera: Conjunctivae normal.      Pupils: Pupils are equal, round, and reactive to light.   Neck:      Vascular: No JVD.   Cardiovascular:      Rate and Rhythm: Regular rhythm. Tachycardia present.      Pulses: Normal pulses.      Heart sounds: Normal heart sounds. No murmur heard.     No friction rub. No gallop.   Pulmonary:      Effort: Pulmonary effort is normal. No respiratory distress.      Breath sounds: Normal breath sounds. No stridor. No wheezing or rales.   Abdominal:      General: There is no distension.      Palpations: Abdomen is soft.      Tenderness: There is no abdominal tenderness. There is no guarding or rebound.   Musculoskeletal:         General: No swelling, tenderness, deformity or signs of injury. Normal range of motion.      Cervical back: Normal range of motion and neck supple. No rigidity.   Skin:     General: Skin is warm and dry.      Capillary Refill: Capillary refill takes less than 2 seconds.      Coloration: Skin is not jaundiced or pale.      Findings: No bruising or erythema.   Neurological:      General: No focal deficit present.      Mental Status: He is alert and oriented to person, place, and time.      Cranial Nerves: No cranial nerve deficit.      Sensory: No sensory deficit.      Motor: No weakness or abnormal muscle tone.      Coordination: Coordination normal.      Gait: Gait normal.         Results Reviewed       Procedure Component Value Units Date/Time    HS Troponin I 2hr [[id number]]     Lab Status: No result Specimen: Blood     HS Troponin 0hr (reflex protocol) [[id number]]  (Normal) Collected: 03/02/25 9512    Lab Status: Final result Specimen: Blood from Arm, Right  Updated: 03/02/25 1836     hs TnI 0hr 5 ng/L     Lactic acid, plasma (w/reflex if result > 2.0) [[id number]]  (Normal) Collected: 03/02/25 1742    Lab Status: Final result Specimen: Blood from Arm, Right Updated: 03/02/25 1836     LACTIC ACID 1.1 mmol/L     Narrative:      Result may be elevated if tourniquet was used during collection.    Basic metabolic panel [[id number]]  (Abnormal) Collected: 03/02/25 1742    Lab Status: Final result Specimen: Blood from Arm, Right Updated: 03/02/25 1831     Sodium 132 mmol/L      Potassium 3.5 mmol/L      Chloride 95 mmol/L      CO2 27 mmol/L      ANION GAP 10 mmol/L      BUN 15 mg/dL      Creatinine 0.89 mg/dL      Glucose 116 mg/dL      Calcium 9.4 mg/dL      eGFR 89 ml/min/1.73sq m     Narrative:      National Kid[employer] Disease Foundation guidelines for Chronic Kidney Disease (CKD):     Stage 1 with normal or high GFR (GFR > 90 mL/min/1.73 square meters)    Stage 2 Mild CKD (GFR = 60-89 mL/min/1.73 square meters)    Stage 3A Moderate CKD (GFR = 45-59 mL/min/1.73 square meters)    Stage 3B Moderate CKD (GFR = 30-44 mL/min/1.73 square meters)    Stage 4 Severe CKD (GFR = 15-29 mL/min/1.73 square meters)    Stage 5 End Stage CKD (GFR <15 mL/min/1.73 square meters)  Note: GFR calculation is accurate only with a steady state creatinine    Hepatic function panel [[id number]]  (Abnormal) Collected: 03/02/25 1742    Lab Status: Final result Specimen: Blood from Arm, Right Updated: 03/02/25 1831     Total Bilirubin 1.82 mg/dL      Bilirubin, Direct 0.16 mg/dL      Alkaline Phosphatase 85 U/L      AST 14 U/L      ALT 17 U/L      Total Protein 7.3 g/dL      Albumin 4.4 g/dL     FLU/COVID Rapid Antigen (30 min. TAT) - Preferred screening test in ED [[id number]]  (Normal) Collected: 03/02/25 1742    Lab Status: Final result Specimen: Nares from Nose Updated: 03/02/25 1824     SARS COV Rapid Antigen Negative     Influenza A Rapid Antigen Negative     Influenza B Rapid Antigen Negative     Narrative:      This test has been performed using the Quidel Mickie 2 FLU+SARS Antigen test under the Emergency Use Authorization (EUA). This test has been validated by the  and verified by the performing laboratory. The Mickie uses lateral flow immunofluorescent sandwich assay to detect SARS-COV, Influenza A and Influenza B Antigen.     The Quidel Mickie 2 SARS Antigen test does not differentiate between SARS-CoV and SARS-CoV-2.     Negative results are presumptive and may be confirmed with a molecular assay, if necessary, for patient management. Negative results do not rule out SARS-CoV-2 or influenza infection and should not be used as the sole basis for treatment or patient management decisions. A negative test result may occur if the level of antigen in a sample is below the limit of detection of this test.     Positive results are indicative of the presence of viral antigens, but do not rule out bacterial infection or co-infection with other viruses.     All test results should be used as an adjunct to clinical observations and other information available to the provider.    FOR PEDIATRIC PATIENTS - copy/paste COVID Guidelines URL to browser: [email or web address]    Protime-INR [[id number]]  (Normal) Collected: 03/02/25 1742    Lab Status: Final result Specimen: Blood from Arm, Right Updated: 03/02/25 1823     Protime 13.6 seconds      INR 0.97    Narrative:      INR Therapeutic Range    Indication                                             INR Range      Atrial Fibrillation                                               2.0-3.0  Hypercoagulable State                                    2.0.2.3  Left Ventricular Asist Device                            2.0-3.0  Mechanical Heart Valve                                  -    Aortic(with afib, MI, embolism, HF, LA enlargement,    and/or coagulopathy)                                     2.0-3.0 (2.5-3.5)      Mitral                                                             2.5-3.5  Prosthetic/Bioprosthetic Heart Valve               2.0-3.0  Venous thromboembolism (VTE: VT, PE        2.0-3.0    APTT [[id number]]  (Normal) Collected: 03/02/25 1742    Lab Status: Final result Specimen: Blood from Arm, Right Updated: 03/02/25 1823     PTT 34 seconds     Urine Microscopic [[id number]]  (Abnormal) Collected: 03/02/25 1757    Lab Status: Final result Specimen: Urine, Indwelling Bella Catheter Updated: 03/02/25 1818     RBC, UA Innumerable /hpf      WBC, UA Innumerable /hpf      Epithelial Cells None Seen /hpf      Bacteria, UA Occasional /hpf      MUCUS THREADS Occasional    Urine culture [[id number]] Collected: 03/02/25 1757    Lab Status: In process Specimen: Urine, Indwelling Bella Catheter Updated: 03/02/25 1818    UA w Reflex to Microscopic w Reflex to Culture [[id number]]  (Abnormal) Collected: 03/02/25 1757    Lab Status: Final result Specimen: Urine, Indwelling Bella Catheter Updated: 03/02/25 1808     Color, UA Yellow     Clarity, UA Turbid     Specific Gravity, UA 1.021     pH, UA 6.0     Leukocytes, UA Moderate     Nitrite, UA Positive     Protein, UA 50 (1+) mg/dl      Glucose, UA Negative mg/dl      Ketones, UA 60 (2+) mg/dl      Urobilinogen, UA <2.0 mg/dl      Bilirubin, UA Negative     Occult Blood, UA Large    CBC and differential [[id number]]  (Abnormal) Collected: 03/02/25 1742    Lab Status: Final result Specimen: Blood from Arm, Right Updated: 03/02/25 1807     WBC 16.92 Thousand/uL      RBC 4.86 Million/uL      Hemoglobin 14.5 g/dL      Hematocrit 42.7 %      MCV 88 fL      MCH 29.8 pg      MCHC 34.0 g/dL      RDW 12.5 %      MPV 10.0 fL      Platelets 286 Thousands/uL      nRBC 0 /100 WBCs      Segmented % 85 %      Immature Grans % 1 %      Lymphocytes % 4 %      Monocytes % 9 %      Eosinophils Relative 0 %      Basophils Relative 1 %      Absolute Neutrophils 14.45 Thousands/µL      Absolute Immature  Grans 0.10 Thousand/uL      Absolute Lymphocytes 0.75 Thousands/µL      Absolute Monocytes 1.46 Thousand/µL      Eosinophils Absolute 0.05 Thousand/µL      Basophils Absolute 0.11 Thousands/µL     Blood culture #2 [[id number]] Collected: 03/02/25 1742    Lab Status: In process Specimen: Blood from Arm, Right Updated: 03/02/25 1802    Blood culture #1 [[id number]] Collected: 03/02/25 1751    Lab Status: In process Specimen: Blood from Arm, Left Updated: 03/02/25 1802            No orders to display       Procedures    ED Medication and Procedure Management   Prior to Admission Medications   Prescriptions Last Dose Informant Patient Reported? Taking?   aspirin 81 mg chewable tablet  Self, Spouse/Significant Other Yes No   Sig: Chew 81 mg daily   atorvastatin (LIPITOR) 40 mg tablet   No No   Sig: Take 1 tablet (40 mg total) by mouth at bedtime   hydroCHLOROthiazide 25 mg tablet  Self, Spouse/Significant Other No No   Sig: Take 1 tablet (25 mg total) by mouth every morning   losartan (COZAAR) 100 MG tablet   No No   Sig: Take 1 tablet (100 mg total) by mouth in the morning   oxybutynin (DITROPAN-XL) 5 mg 24 hr tablet   No No   Sig: Take 1 tablet (5 mg total) by mouth daily as needed (bladder spasms) Hold 48 hours prior to void trial/rich removal.   tamsulosin (FLOMAX) 0.4 mg   No No   Sig: TAKE 1 CAPSULE(0.4 MG) BY MOUTH DAILY WITH DINNER      Facility-Administered Medications: None     Patient's Medications   Discharge Prescriptions    No medications on file     No discharge procedures on file.  ED SEPSIS DOCUMENTATION   Time reflects when diagnosis was documented in both MDM as applicable and the Disposition within this note       Time User Action Codes Description Comment    3/2/2025  6:46 PM Delfino Carlson [A41.9] Sepsis (HCC)     3/2/2025  6:46 PM Delfino Carlson Add [N39.0] UTI (urinary tract infection)                  Delfino Carlson MD  03/02/25 9509

## 2025-03-03 NOTE — ASSESSMENT & PLAN NOTE
Sodium 132 on admission felt to be in the setting of sepsis.  Given 2 L bolus of IV fluids  Continue with IV hydration  Reassess BMP in the a.m.

## 2025-03-03 NOTE — PROGRESS NOTES
Progress Note - Hospitalist   Name: Trent Fonseca 64 y.o. male I MRN: [id number]  Unit/Bed#: -[facility] I Date of Admission: 3/2/2025   Date of Service: 3/3/2025 I Hospital Day: 0    Assessment & Plan  Sepsis without acute organ dysfunction (HCC)  Patient presentation due to fever at home, tachycardia 110, elevated WBC 16k--improved to 13.4 K  Suspected this was to be urinary tract infection.  UA with positive nitrates,, leukocytes, innumerable WBC and RBC.  Lactic acid 1.1, Pro-Brian not obtained   pending blood cultures and urine culture  Will start treatment with empirical IV ceftriaxone while awaiting culture.  Patient reports feeling significantly improved over last 24 hours and is no longer having chills, as patient is a chronic Bella user, will await for urine culture to ensure appropriate antibiotic use  Monitor fever curve and CBC    Acute cystitis without hematuria  Abnormal UA.  Patient recently placed Bella catheter due to BPH on February 9th.  Patient reports that yesterday after Bella replacement due to leakage he noticed overnight some blood in the urine however as of this morning the urine cleared.  Patient would prefer to hold aspirin for now.  Can resume on discharge.  Start treating empirically with IV ceftriaxone  No previous urine cultures.      Benign prostate hyperplasia  History of BPH status post Bella placement due to urinary retention.  Continue with the Bella catheter  Follow-up with urology  Hyponatremia  Sodium 132 on admission felt to be in the setting of sepsis.,  Improved to 136 after IV fluid bolus  Given 2 L bolus of IV fluids  Continue with IV hydration  Reassess BMP in the a.m.    VTE Pharmacologic Prophylaxis: VTE Score: 4 Moderate Risk (Score 3-4) - Pharmacological DVT Prophylaxis Contraindicated. Sequential Compression Devices Ordered.    Mobility:   Basic Mobility Inpatient Raw Score: 24  JH-HLM Goal: 8: Walk 250 feet or more  JH-HLM Achieved: 8: Walk 250 feet ot  more  JH-HLM Goal achieved. Continue to encourage appropriate mobility.    Patient Centered Rounds: I performed bedside rounds with nursing staff today.   Discussions with Specialists or Other Care Team Provider: CM    Education and Discussions with Family / Patient: Patient declined call to .     Current Length of Stay: 0 day(s)  Current Patient Status: Observation   Certification Statement: The patient will continue to require additional inpatient hospital stay due to awaiting urine culture  Discharge Plan: Anticipate discharge later today or tomorrow to home.    Code Status: Level 1 - Full Code    Subjective   Patient reports to be having significant improved her last 24 hours.  Currently denies any chest pain/pressure, palpitations, intense, nausea, shortness of breath, or chills.    Objective :  Temp:  [97.7 °F (36.5 °C)-102.2 °F (39 °C)] 99.2 °F (37.3 °C)  HR:  [] 105  BP: (121-157)/(58-75) 136/72  Resp:  [14-25] 18  SpO2:  [90 %-99 %] 93 %  O2 Device: None (Room air)    Body mass index is 27.96 kg/m².     Input and Output Summary (last 24 hours):     Intake/Output Summary (Last 24 hours) at 3/3/2025 1511  Last data filed at 3/3/2025 1236  Gross per 24 hour   Intake 2053.33 ml   Output 1900 ml   Net 153.33 ml       Physical Exam  Vitals and nursing note reviewed.   Constitutional:       General: He is not in acute distress.     Appearance: He is normal weight. He is not ill-appearing, toxic-appearing or diaphoretic.   HENT:      Head: Normocephalic.      Nose: Nose normal.      Mouth/Throat:      Mouth: Mucous membranes are moist.      Pharynx: Oropharynx is clear.   Eyes:      General: No scleral icterus.     Conjunctiva/sclera: Conjunctivae normal.      Pupils: Pupils are equal, round, and reactive to light.   Cardiovascular:      Rate and Rhythm: Normal rate and regular rhythm.      Heart sounds: No murmur heard.     No friction rub. No gallop.   Pulmonary:      Effort: Pulmonary effort  is normal. No respiratory distress.      Breath sounds: Normal breath sounds. No stridor. No wheezing, rhonchi or rales.   Abdominal:      General: Abdomen is flat.      Palpations: Abdomen is soft.   Genitourinary:     Comments: Bella retention bag shows clear yellow urine  Musculoskeletal:         General: Normal range of motion.      Cervical back: Normal range of motion and neck supple.      Right lower leg: No edema.      Left lower leg: No edema.   Lymphadenopathy:      Cervical: No cervical adenopathy.   Skin:     General: Skin is warm.      Coloration: Skin is not jaundiced or pale.      Findings: No bruising, erythema or lesion.   Neurological:      General: No focal deficit present.      Mental Status: He is alert and oriented to person, place, and time. Mental status is at baseline.      Cranial Nerves: No cranial nerve deficit.      Motor: No weakness.   Psychiatric:         Mood and Affect: Mood normal.         Behavior: Behavior normal.         Thought Content: Thought content normal.           Lines/Drains:    Urinary Catheter:  Goal for removal: N/A - Chronic Bella                 Lab Results: I have reviewed the following results:   Results from last 7 days   Lab Units 03/03/25  0504 03/02/25  1742   WBC Thousand/uL 13.44* 16.92*   HEMOGLOBIN g/dL 13.6 14.5   HEMATOCRIT % 41.1 42.7   PLATELETS Thousands/uL 222 286   SEGS PCT %  --  85*   LYMPHO PCT %  --  4*   MONO PCT %  --  9   EOS PCT %  --  0     Results from last 7 days   Lab Units 03/03/25  0504 03/02/25  1742   SODIUM mmol/L 136 132*   POTASSIUM mmol/L 3.4* 3.5   CHLORIDE mmol/L 100 95*   CO2 mmol/L 28 27   BUN mg/dL 14 15   CREATININE mg/dL 1.03 0.89   ANION GAP mmol/L 8 10   CALCIUM mg/dL 8.9 9.4   ALBUMIN g/dL  --  4.4   TOTAL BILIRUBIN mg/dL  --  1.82*   ALK PHOS U/L  --  85   ALT U/L  --  17   AST U/L  --  14   GLUCOSE RANDOM mg/dL 108 116     Results from last 7 days   Lab Units 03/02/25  1742   INR  0.97             Results from last 7  days   Lab Units 03/02/25  1742   LACTIC ACID mmol/L 1.1       Recent Cultures (last 7 days):   Results from last 7 days   Lab Units 03/02/25  1751 03/02/25  1742   BLOOD CULTURE  Received in Microbiology Lab. Culture in Progress. Received in Microbiology Lab. Culture in Progress.       Imaging Results Review: No pertinent imaging studies reviewed.  Other Study Results Review: No additional pertinent studies reviewed.    Last 24 Hours Medication List:     Current Facility-Administered Medications:     acetaminophen (TYLENOL) tablet 650 mg, Q6H PRN    atorvastatin (LIPITOR) tablet 40 mg, Nightly    ceftriaxone (ROCEPHIN) 1 g/50 mL in dextrose IVPB, Q24H    enoxaparin (LOVENOX) subcutaneous injection 40 mg, Daily    hydroCHLOROthiazide tablet 25 mg, QAM    losartan (COZAAR) tablet 100 mg, Daily    sodium chloride 0.9 % infusion, Continuous, Last Rate: 75 mL/hr (03/02/25 1945)    tamsulosin (FLOMAX) capsule 0.4 mg, Daily With Dinner    Administrative Statements   Today, Patient Was Seen By: Eric Engel PA-C  I have spent a total time of 35 minutes in caring for this patient on the day of the visit/encounter including Documenting in the medical record, Reviewing/placing orders in the medical record (including tests, medications, and/or procedures), Obtaining or reviewing history  , and Communicating with other healthcare professionals .    **Please Note: This note may have been constructed using a voice recognition system.**

## 2025-03-03 NOTE — CASE MANAGEMENT
Case Management Assessment & Discharge Planning Note    Patient name Trent Fonseca  Location ED 10/ED 10 MRN [id number]  : 1960 Date 3/3/2025       Current Admission Date: 3/2/2025  Current Admission Diagnosis:Sepsis without acute organ dysfunction (HCC)   Patient Active Problem List    Diagnosis Date Noted Date Diagnosed    Benign prostate hyperplasia 2025     Sepsis without acute organ dysfunction (HCC) 2025     Acute cystitis without hematuria 2025     Hyponatremia 2025     Prediabetes 2025     Adrenal mass greater than 4 cm in diameter (HCC) 2025     Primary hypertension 2025     Hyperlipidemia 2025     Family hx of colon cancer 2025     Elevated PSA 2025       LOS (days): 0  Geometric Mean LOS (GMLOS) (days):   Days to GMLOS:     OBJECTIVE:              Current admission status: Observation       Preferred Pharmacy:   [id number] - [address] - [address]  [address]  [place] [address]  Phone: [phone] Fax: [phone]    Primary Care Provider: Sierra Quiroz DO    Primary Insurance: MEDICARE  Secondary Insurance: COMMERCIAL MISCELLANEOUS    ASSESSMENT:  Active Health Care Proxies       Jeannine Fonseca Health Care Representative - Spouse   Primary Phone: [phone] (Mobile)                 Advance Directives  Does patient have a Health Care POA?: No  Was patient offered paperwork?: Yes  Does patient currently have a Health Care decision maker?: Yes, please see Health Care Proxy section  Does patient have Advance Directives?: No  Was patient offered paperwork?: Yes  Primary Contact: Jeannine Fonseca/Wife ([phone])    Readmission Root Cause  30 Day Readmission: No    Patient Information  Admitted from:: Home  Mental Status: Alert  During Assessment patient was accompanied by: Spouse  Assessment information provided by:: Patient  Primary Caregiver: Self  Support Systems: Spouse/significant  other  County of Residence: [place]  What [facility] do you live in?: [place]  Home entry access options. Select all that apply.: Stairs  Number of steps to enter home.: 10  Do the steps have railings?: Yes  Type of Current Residence: Bi-level  Upon entering residence, is there a bedroom on the main floor (no further steps)?: No  A bedroom is located on the following floor levels of residence (select all that apply):: 2nd Floor  Upon entering residence, is there a bathroom on the main floor (no further steps)?: No  Indicate which floors of current residence have a bathroom (select all the apply):: 2nd Floor  Number of steps to 2nd floor from main floor: 10  Living Arrangements: Lives w/ Spouse/significant other  Is patient a ?: No    Activities of Daily Living Prior to Admission  Functional Status: Independent  Completes ADLs independently?: Yes  Ambulates independently?: Yes  Does patient use assisted devices?: No  Does patient currently own DME?: No  Does patient have a history of Outpatient Therapy (PT/OT)?: No  Does the patient have a history of Short-Term Rehab?: No  Does patient have a history of HHC?: No  Does patient currently have HHC?: No    Patient Information Continued  Income Source: Employed  Does patient have prescription coverage?: Yes  Does patient have a history of substance abuse?: No  Does patient have a history of Mental Health Diagnosis?: No    Means of Transportation  Means of Transport to \[facility] [place] [facility]\"":: Drives Self    DISCHARGE DETAILS:    Discharge planning discussed with:: Patient     CM contacted family/caregiver?: Yes (Wife at bedside during assessment)  Were Treatment Team discharge recommendations reviewed with patient/caregiver?: Yes  Did patient/caregiver verbalize understanding of patient care needs?: Yes  Were patient/caregiver advised of the risks associated with not following Treatment Team discharge recommendations?: Yes    Contacts  Patient Contacts: Patient  Contact Method: In  Person  Reason/Outcome: Continuity of Care, Emergency Contact, Discharge Planning    Other Referral/Resources/Interventions Provided:  Interventions: None Indicated    Treatment Team Recommendation: Home  Discharge Destination Plan:: Home  Transport at Discharge : Family     Additional Comments: Patient reports residing with his wife in a split level home where he is independent with ADLs and ambulation, no use or ownership of DME. Pt reports no history of OPPT, HHC, STR, MH treatment, or D/A treatment. Pt indicates no discharge concerns or needs, CM department to remain available.

## 2025-03-04 LAB
ANION GAP SERPL CALCULATED.3IONS-SCNC: 7 MMOL/L (ref 4–13)
BUN SERPL-MCNC: 10 MG/DL (ref 5–25)
CALCIUM SERPL-MCNC: 8 MG/DL (ref 8.4–10.2)
CHLORIDE SERPL-SCNC: 101 MMOL/L (ref 96–108)
CO2 SERPL-SCNC: 27 MMOL/L (ref 21–32)
CREAT SERPL-MCNC: 0.92 MG/DL (ref 0.6–1.3)
ERYTHROCYTE [DISTWIDTH] IN BLOOD BY AUTOMATED COUNT: 12.7 % (ref 11.6–15.1)
GFR SERPL CREATININE-BSD FRML MDRD: 86 ML/MIN/1.73SQ M
GLUCOSE SERPL-MCNC: 108 MG/DL (ref 65–140)
HCT VFR BLD AUTO: 36.3 % (ref 36.5–49.3)
HGB BLD-MCNC: 12.2 G/DL (ref 12–17)
MCH RBC QN AUTO: 29.5 PG (ref 26.8–34.3)
MCHC RBC AUTO-ENTMCNC: 33.6 G/DL (ref 31.4–37.4)
MCV RBC AUTO: 88 FL (ref 82–98)
PLATELET # BLD AUTO: 184 THOUSANDS/UL (ref 149–390)
PMV BLD AUTO: 9.8 FL (ref 8.9–12.7)
POTASSIUM SERPL-SCNC: 3.1 MMOL/L (ref 3.5–5.3)
RBC # BLD AUTO: 4.14 MILLION/UL (ref 3.88–5.62)
SODIUM SERPL-SCNC: 135 MMOL/L (ref 135–147)
WBC # BLD AUTO: 10.38 THOUSAND/UL (ref 4.31–10.16)

## 2025-03-04 PROCEDURE — 80048 BASIC METABOLIC PNL TOTAL CA: CPT

## 2025-03-04 PROCEDURE — 99232 SBSQ HOSP IP/OBS MODERATE 35: CPT | Performed by: NURSE PRACTITIONER

## 2025-03-04 PROCEDURE — 85027 COMPLETE CBC AUTOMATED: CPT

## 2025-03-04 RX ORDER — POTASSIUM CHLORIDE 1500 MG/1
40 TABLET, EXTENDED RELEASE ORAL 2 TIMES DAILY
Status: COMPLETED | OUTPATIENT
Start: 2025-03-04 | End: 2025-03-04

## 2025-03-04 RX ADMIN — ATORVASTATIN CALCIUM 40 MG: 40 TABLET, FILM COATED ORAL at 21:03

## 2025-03-04 RX ADMIN — POTASSIUM CHLORIDE 40 MEQ: 1500 TABLET, EXTENDED RELEASE ORAL at 08:32

## 2025-03-04 RX ADMIN — TAMSULOSIN HYDROCHLORIDE 0.4 MG: 0.4 CAPSULE ORAL at 16:58

## 2025-03-04 RX ADMIN — SODIUM CHLORIDE 75 ML/HR: 0.9 INJECTION, SOLUTION INTRAVENOUS at 00:32

## 2025-03-04 RX ADMIN — HYDROCHLOROTHIAZIDE 25 MG: 25 TABLET ORAL at 08:31

## 2025-03-04 RX ADMIN — ACETAMINOPHEN 650 MG: 325 TABLET, FILM COATED ORAL at 16:57

## 2025-03-04 RX ADMIN — ENOXAPARIN SODIUM 40 MG: 40 INJECTION SUBCUTANEOUS at 08:32

## 2025-03-04 RX ADMIN — POTASSIUM CHLORIDE 40 MEQ: 1500 TABLET, EXTENDED RELEASE ORAL at 16:58

## 2025-03-04 RX ADMIN — ACETAMINOPHEN 650 MG: 325 TABLET, FILM COATED ORAL at 23:45

## 2025-03-04 RX ADMIN — CEFTRIAXONE SODIUM 1000 MG: 10 INJECTION, POWDER, FOR SOLUTION INTRAVENOUS at 16:50

## 2025-03-04 RX ADMIN — LOSARTAN POTASSIUM 100 MG: 50 TABLET, FILM COATED ORAL at 08:32

## 2025-03-04 NOTE — PLAN OF CARE
Problem: INFECTION - ADULT  Goal: Absence or prevention of progression during hospitalization  Description: INTERVENTIONS:  - Assess and monitor for signs and symptoms of infection  - Monitor lab/diagnostic results  - Monitor all insertion sites, i.e. indwelling lines, tubes, and drains  - Monitor endotracheal if appropriate and nasal secretions for changes in amount and color  - [place] appropriate cooling/warming therapies per order  - Administer medications as ordered  - Instruct and encourage patient and family to use good hand hygiene technique  - Identify and instruct in appropriate isolation precautions for identified infection/condition  Outcome: Progressing     Problem: DISCHARGE PLANNING  Goal: Discharge to home or other facility with appropriate resources  Description: INTERVENTIONS:  - Identify barriers to discharge w/patient and caregiver  - Arrange for needed discharge resources and transportation as appropriate  - Identify discharge learning needs (meds, wound care, etc.)  - Arrange for interpretive services to assist at discharge as needed  - Refer to Case Management Department for coordinating discharge planning if the patient needs post-hospital services based on physician/advanced practitioner order or complex needs related to functional status, cognitive ability, or social support system  Outcome: Progressing     Problem: Knowledge Deficit  Goal: Patient/family/caregiver demonstrates understanding of disease process, treatment plan, medications, and discharge instructions  Description: Complete learning assessment and assess knowledge base.  Interventions:  - Provide teaching at level of understanding  - Provide teaching via preferred learning methods  Outcome: Progressing

## 2025-03-04 NOTE — ASSESSMENT & PLAN NOTE
Abnormal UA.  Patient recently placed Bella catheter due to BPH on February 9th.  Patient reports that yesterday after Bella replacement due to leakage he noticed overnight some blood in the urine however as of this morning the urine cleared.  Patient would prefer to hold aspirin for now.  Can resume on discharge.  Continue IV ceftriaxone with transition to oral antibiotics  No previous urine cultures.

## 2025-03-04 NOTE — PROGRESS NOTES
Progress Note - Hospitalist   Name: Trent Fonseca 64 y.o. male I MRN: [id number]  Unit/Bed#: MS [facility] I Date of Admission: 3/2/2025   Date of Service: 3/4/2025 I Hospital Day: 1    Assessment & Plan  Sepsis without acute organ dysfunction (HCC)  Patient presentation due to fever at home, tachycardia 110, elevated WBC 16k--improved to 10.38 K  Suspected this was to be urinary tract infection.  UA with positive nitrates,, leukocytes, innumerable WBC and RBC.  Lactic acid 1.1, Pro-Brian not obtained   Blood cultures no growth x 24 hours   Urine culture   Continue empirical IV ceftriaxone while awaiting culture.  Patient reports feeling significantly improved over last 24 hours and is no longer having chills, as patient is a chronic Bella user, will await for urine culture to ensure appropriate antibiotic use  Monitor fever curve and CBC    Acute cystitis without hematuria  Abnormal UA.  Patient recently placed Bella catheter due to BPH on February 9th.  Patient reports that yesterday after Bella replacement due to leakage he noticed overnight some blood in the urine however as of this morning the urine cleared.  Patient would prefer to hold aspirin for now.  Can resume on discharge.  Continue IV ceftriaxone with transition to oral antibiotics  No previous urine cultures.      Benign prostate hyperplasia  History of BPH status post Bella placement due to urinary retention.  Continue with the Bella catheter  Follow-up with urology  Hyponatremia  Sodium 132 on admission felt to be in the setting of sepsis.,  Improved to 135-136 after IV fluid bolus  Given 2 L bolus of IV fluids  Continue with IV hydration  Reassess BMP in the a.m.    VTE Pharmacologic Prophylaxis: VTE Score: 4 Moderate Risk (Score 3-4) - Pharmacological DVT Prophylaxis Ordered: enoxaparin (Lovenox).    Mobility:   Basic Mobility Inpatient Raw Score: 24  JH-HLM Goal: 8: Walk 250 feet or more  JH-HLM Achieved: 8: Walk 250 feet ot more  JH-HLM Goal  achieved. Continue to encourage appropriate mobility.    Patient Centered Rounds: I performed bedside rounds with nursing staff today.   Discussions with Specialists or Other Care Team Provider: urology    Education and Discussions with Family / Patient: Updated  (wife) at bedside.    Current Length of Stay: 1 day(s)  Current Patient Status: Inpatient   Certification Statement: The patient will continue to require additional inpatient hospital stay due to tx of UTI with IV antibiotics awaiting UC results for dispo planning  Discharge Plan:  today vs tomorrow     Code Status: Level 1 - Full Code    Subjective   Patient reports having mild fever sx yesterday evening but since then feels better symptomatically. Patient wife at bedside had questions regarding upcoming appointments discussed at length no other concerns at this time. Patient reports no problem staying until culture results as he does not want to come back     Objective :  Temp:  [97.7 °F (36.5 °C)-100.5 °F (38.1 °C)] 99 °F (37.2 °C)  HR:  [] 108  BP: (120-157)/(63-75) 137/75  Resp:  [14-22] 16  SpO2:  [91 %-96 %] 94 %  O2 Device: None (Room air)    Body mass index is 27.96 kg/m².     Input and Output Summary (last 24 hours):     Intake/Output Summary (Last 24 hours) at 3/4/2025 0857  Last data filed at 3/4/2025 0652  Gross per 24 hour   Intake --   Output 2850 ml   Net -2850 ml       Physical Exam  Vitals and nursing note reviewed.   Constitutional:       General: He is not in acute distress.     Appearance: He is well-developed.   HENT:      Head: Normocephalic and atraumatic.   Eyes:      Conjunctiva/sclera: Conjunctivae normal.   Cardiovascular:      Rate and Rhythm: Normal rate and regular rhythm.      Heart sounds: No murmur heard.  Pulmonary:      Effort: Pulmonary effort is normal. No respiratory distress.      Breath sounds: Normal breath sounds.   Abdominal:      Palpations: Abdomen is soft.      Tenderness: There is no  abdominal tenderness.   Musculoskeletal:         General: No swelling.      Cervical back: Neck supple.   Skin:     General: Skin is warm and dry.      Capillary Refill: Capillary refill takes less than 2 seconds.   Neurological:      Mental Status: He is alert and oriented to person, place, and time.   Psychiatric:         Mood and Affect: Mood normal.           Lines/Drains:  Lines/Drains/Airways       Active Status       Name Placement date Placement time Site Days    Urethral Catheter Coude 16 Fr. 03/01/25 2155  Coude  2                  Urinary Catheter:  Goal for removal: N/A- Discharging with Bella                 Lab Results: I have reviewed the following results:   Results from last 7 days   Lab Units 03/04/25  0524 03/03/25  0504 03/02/25  1742   WBC Thousand/uL 10.38*   < > 16.92*   HEMOGLOBIN g/dL 12.2   < > 14.5   HEMATOCRIT % 36.3*   < > 42.7   PLATELETS Thousands/uL 184   < > 286   SEGS PCT %  --   --  85*   LYMPHO PCT %  --   --  4*   MONO PCT %  --   --  9   EOS PCT %  --   --  0    < > = values in this interval not displayed.     Results from last 7 days   Lab Units 03/04/25  0524 03/03/25  0504 03/02/25  1742   SODIUM mmol/L 135   < > 132*   POTASSIUM mmol/L 3.1*   < > 3.5   CHLORIDE mmol/L 101   < > 95*   CO2 mmol/L 27   < > 27   BUN mg/dL 10   < > 15   CREATININE mg/dL 0.92   < > 0.89   ANION GAP mmol/L 7   < > 10   CALCIUM mg/dL 8.0*   < > 9.4   ALBUMIN g/dL  --   --  4.4   TOTAL BILIRUBIN mg/dL  --   --  1.82*   ALK PHOS U/L  --   --  85   ALT U/L  --   --  17   AST U/L  --   --  14   GLUCOSE RANDOM mg/dL 108   < > 116    < > = values in this interval not displayed.     Results from last 7 days   Lab Units 03/02/25  1742   INR  0.97             Results from last 7 days   Lab Units 03/02/25  1742   LACTIC ACID mmol/L 1.1       Recent Cultures (last 7 days):   Results from last 7 days   Lab Units 03/02/25  1751 03/02/25  1742   BLOOD CULTURE  No Growth at 24 hrs. No Growth at 24 hrs.        Imaging Results Review: No pertinent imaging studies reviewed.  Other Study Results Review: No additional pertinent studies reviewed.    Last 24 Hours Medication List:     Current Facility-Administered Medications:     acetaminophen (TYLENOL) tablet 650 mg, Q6H PRN    atorvastatin (LIPITOR) tablet 40 mg, Nightly    ceftriaxone (ROCEPHIN) 1 g/50 mL in dextrose IVPB, Q24H, Last Rate: 1,000 mg (03/03/25 1807)    enoxaparin (LOVENOX) subcutaneous injection 40 mg, Daily    hydroCHLOROthiazide tablet 25 mg, QAM    losartan (COZAAR) tablet 100 mg, Daily    potassium chloride (Klor-Con M20) CR tablet 40 mEq, BID    tamsulosin (FLOMAX) capsule 0.4 mg, Daily With Dinner    Administrative Statements   Today, Patient Was Seen By: MATTI Drew  I have spent a total time of 45 minutes in caring for this patient on the day of the visit/encounter including Risks and benefits of tx options, Instructions for management, Patient and family education, Counseling / Coordination of care, Documenting in the medical record, Reviewing/placing orders in the medical record (including tests, medications, and/or procedures), and Communicating with other healthcare professionals .    **Please Note: This note may have been constructed using a voice recognition system.**

## 2025-03-04 NOTE — ASSESSMENT & PLAN NOTE
Sodium 132 on admission felt to be in the setting of sepsis.,  Improved to 135-136 after IV fluid bolus  Given 2 L bolus of IV fluids  Continue with IV hydration  Reassess BMP in the a.m.

## 2025-03-04 NOTE — ASSESSMENT & PLAN NOTE
Patient presentation due to fever at home, tachycardia 110, elevated WBC 16k--improved to 10.38 K  Suspected this was to be urinary tract infection.  UA with positive nitrates,, leukocytes, innumerable WBC and RBC.  Lactic acid 1.1, Pro-Brian not obtained   Blood cultures no growth x 24 hours   Urine culture   Continue empirical IV ceftriaxone while awaiting culture.  Patient reports feeling significantly improved over last 24 hours and is no longer having chills, as patient is a chronic Bella user, will await for urine culture to ensure appropriate antibiotic use  Monitor fever curve and CBC

## 2025-03-05 ENCOUNTER — APPOINTMENT (INPATIENT)
Dept: CT IMAGING | Facility: HOSPITAL | Age: 65
DRG: 698 | End: 2025-03-05
Payer: MEDICARE

## 2025-03-05 PROBLEM — T83.511A URINARY TRACT INFECTION ASSOCIATED WITH INDWELLING URETHRAL CATHETER  (HCC): Status: ACTIVE | Noted: 2025-03-02

## 2025-03-05 PROBLEM — E87.1 HYPONATREMIA: Status: RESOLVED | Noted: 2025-03-02 | Resolved: 2025-03-05

## 2025-03-05 PROBLEM — N39.0 URINARY TRACT INFECTION ASSOCIATED WITH INDWELLING URETHRAL CATHETER  (HCC): Status: ACTIVE | Noted: 2025-03-02

## 2025-03-05 LAB
ANION GAP SERPL CALCULATED.3IONS-SCNC: 7 MMOL/L (ref 4–13)
BACTERIA UR CULT: ABNORMAL
BUN SERPL-MCNC: 8 MG/DL (ref 5–25)
CALCIUM SERPL-MCNC: 8.6 MG/DL (ref 8.4–10.2)
CHLORIDE SERPL-SCNC: 100 MMOL/L (ref 96–108)
CO2 SERPL-SCNC: 29 MMOL/L (ref 21–32)
CREAT SERPL-MCNC: 0.88 MG/DL (ref 0.6–1.3)
ERYTHROCYTE [DISTWIDTH] IN BLOOD BY AUTOMATED COUNT: 12.7 % (ref 11.6–15.1)
GFR SERPL CREATININE-BSD FRML MDRD: 89 ML/MIN/1.73SQ M
GLUCOSE SERPL-MCNC: 108 MG/DL (ref 65–140)
HCT VFR BLD AUTO: 37.8 % (ref 36.5–49.3)
HGB BLD-MCNC: 12.5 G/DL (ref 12–17)
MCH RBC QN AUTO: 29.1 PG (ref 26.8–34.3)
MCHC RBC AUTO-ENTMCNC: 33.1 G/DL (ref 31.4–37.4)
MCV RBC AUTO: 88 FL (ref 82–98)
PLATELET # BLD AUTO: 201 THOUSANDS/UL (ref 149–390)
PMV BLD AUTO: 10.1 FL (ref 8.9–12.7)
POTASSIUM SERPL-SCNC: 3.5 MMOL/L (ref 3.5–5.3)
RBC # BLD AUTO: 4.3 MILLION/UL (ref 3.88–5.62)
SODIUM SERPL-SCNC: 136 MMOL/L (ref 135–147)
WBC # BLD AUTO: 6.07 THOUSAND/UL (ref 4.31–10.16)

## 2025-03-05 PROCEDURE — 74177 CT ABD & PELVIS W/CONTRAST: CPT

## 2025-03-05 PROCEDURE — 99232 SBSQ HOSP IP/OBS MODERATE 35: CPT | Performed by: NURSE PRACTITIONER

## 2025-03-05 PROCEDURE — 80048 BASIC METABOLIC PNL TOTAL CA: CPT | Performed by: NURSE PRACTITIONER

## 2025-03-05 PROCEDURE — 85027 COMPLETE CBC AUTOMATED: CPT | Performed by: NURSE PRACTITIONER

## 2025-03-05 RX ADMIN — HYDROCHLOROTHIAZIDE 25 MG: 25 TABLET ORAL at 08:39

## 2025-03-05 RX ADMIN — LOSARTAN POTASSIUM 100 MG: 50 TABLET, FILM COATED ORAL at 08:40

## 2025-03-05 RX ADMIN — CEFTRIAXONE SODIUM 1000 MG: 10 INJECTION, POWDER, FOR SOLUTION INTRAVENOUS at 18:03

## 2025-03-05 RX ADMIN — ENOXAPARIN SODIUM 40 MG: 40 INJECTION SUBCUTANEOUS at 08:40

## 2025-03-05 RX ADMIN — TAMSULOSIN HYDROCHLORIDE 0.4 MG: 0.4 CAPSULE ORAL at 18:03

## 2025-03-05 RX ADMIN — IOHEXOL 100 ML: 350 INJECTION, SOLUTION INTRAVENOUS at 16:44

## 2025-03-05 RX ADMIN — ATORVASTATIN CALCIUM 40 MG: 40 TABLET, FILM COATED ORAL at 20:16

## 2025-03-05 NOTE — PLAN OF CARE
Problem: INFECTION - ADULT  Goal: Absence of fever/infection during neutropenic period  Description: INTERVENTIONS:  - Monitor WBC  Outcome: Progressing     Problem: DISCHARGE PLANNING  Goal: Discharge to home or other facility with appropriate resources  Description: INTERVENTIONS:  - Identify barriers to discharge w/patient and caregiver  - Arrange for needed discharge resources and transportation as appropriate  - Identify discharge learning needs (meds, wound care, etc.)  - Arrange for interpretive services to assist at discharge as needed  - Refer to Case Management Department for coordinating discharge planning if the patient needs post-hospital services based on physician/advanced practitioner order or complex needs related to functional status, cognitive ability, or social support system  Outcome: Progressing

## 2025-03-05 NOTE — PROGRESS NOTES
Progress Note - Hospitalist   Name: Trent Fonseca 64 y.o. male I MRN: [id number]  Unit/Bed#: -01 I Date of Admission: 3/2/2025   Date of Service: 3/5/2025 I Hospital Day: 2    Assessment & Plan  Sepsis without acute organ dysfunction (HCC)  Patient presented to the ED with complaints of fever.  Meeting SIRS criteria on admission with tachycardia and leukocytosis.  Secondary to urinary tract infection secondary to chronic indwelling urinary catheter  UA on admission noted to be positive for nitrites, leukocytes with innumerable WBC.  Urine culture >100,000 klebsiella oxytoca   Blood cultures negative x 48 hrs  Continue with IV Ceftriaxone at this time  Given ongoing fever spikes, will obtain CT imaging Abd/Pelvis with contrast to evaluate for possible prostatitis, or possibly even pyelonephritis.  Plan to treat x 10 days.  Urinary tract infection associated with indwelling urethral catheter  (HCC)  New rich placed on 2/9 for urinary retention secondary to BPH  Was replaced on 3/1 prior to admission, due to leakage, following replacement, he noted some hematuria but it resolved.  Benign prostate hyperplasia  History of BPH status post Rich placement due to urinary retention.  Continue with the Rich catheter  Follow-up with urology  Hyponatremia (Resolved: 3/5/2025)  Resolved    VTE Pharmacologic Prophylaxis: VTE Score: 4 Moderate Risk (Score 3-4) - Pharmacological DVT Prophylaxis Ordered: enoxaparin (Lovenox).    Mobility:   Basic Mobility Inpatient Raw Score: 24  JH-HLM Goal: 8: Walk 250 feet or more  JH-HLM Achieved: 8: Walk 250 feet ot more  JH-HLM Goal achieved. Continue to encourage appropriate mobility.    Patient Centered Rounds: I performed bedside rounds with nursing staff today.   Discussions with Specialists or Other Care Team Provider: Case Management    Education and Discussions with Family / Patient: Updated  (wife) at bedside.    Current Length of Stay: 2 day(s)  Current  Patient Status: Inpatient   Certification Statement: The patient will continue to require additional inpatient hospital stay due to ongoing treatment in setting of fever work-up.  Discharge Plan: Anticipate discharge tomorrow to home.    Code Status: Level 1 - Full Code    Subjective   Patient resting on the couch with wife at bedside.  NO complaints of chest pain, shortness of breath, fever or chills at this time.  He states that when he spikes a temperature, he does get intense chills with loss of appetite and once he gets the Tylenol, chills subside and his appetite returns.  Reports normal bowel movements, last one this morning.    Objective :  Temp:  [98.4 °F (36.9 °C)-100.6 °F (38.1 °C)] 98.4 °F (36.9 °C)  HR:  [] 92  BP: (138-143)/(75-77) 143/77  Resp:  [16] 16  SpO2:  [92 %-97 %] 92 %    Body mass index is 27.96 kg/m².     Input and Output Summary (last 24 hours):     Intake/Output Summary (Last 24 hours) at 3/5/2025 1147  Last data filed at 3/5/2025 0803  Gross per 24 hour   Intake 460 ml   Output 2600 ml   Net -2140 ml       Physical Exam  Vitals and nursing note reviewed.   Constitutional:       General: He is not in acute distress.  Cardiovascular:      Rate and Rhythm: Normal rate.      Pulses: Normal pulses.      Heart sounds: Normal heart sounds.   Pulmonary:      Effort: Pulmonary effort is normal.      Breath sounds: Normal breath sounds.   Abdominal:      General: Bowel sounds are normal. There is no distension.      Palpations: Abdomen is soft.      Tenderness: There is no abdominal tenderness.   Genitourinary:     Comments: Bella in place - clear yellow urine noted.  Musculoskeletal:         General: No tenderness. Normal range of motion.   Skin:     General: Skin is warm and dry.   Neurological:      Mental Status: He is alert and oriented to person, place, and time.   Psychiatric:         Mood and Affect: Mood normal.           Lines/Drains:  Lines/Drains/Airways       Active Status        Name Placement date Placement time Site Days    Urethral Catheter Coude 16 Fr. 03/01/25 2155  Coude  3                  Urinary Catheter:  Goal for removal: N/A- Discharging with Bella                 Lab Results: I have reviewed the following results:   Results from last 7 days   Lab Units 03/05/25  0539 03/03/25  0504 03/02/25  1742   WBC Thousand/uL 6.07   < > 16.92*   HEMOGLOBIN g/dL 12.5   < > 14.5   HEMATOCRIT % 37.8   < > 42.7   PLATELETS Thousands/uL 201   < > 286   SEGS PCT %  --   --  85*   LYMPHO PCT %  --   --  4*   MONO PCT %  --   --  9   EOS PCT %  --   --  0    < > = values in this interval not displayed.     Results from last 7 days   Lab Units 03/05/25  0539 03/03/25  0504 03/02/25  1742   SODIUM mmol/L 136   < > 132*   POTASSIUM mmol/L 3.5   < > 3.5   CHLORIDE mmol/L 100   < > 95*   CO2 mmol/L 29   < > 27   BUN mg/dL 8   < > 15   CREATININE mg/dL 0.88   < > 0.89   ANION GAP mmol/L 7   < > 10   CALCIUM mg/dL 8.6   < > 9.4   ALBUMIN g/dL  --   --  4.4   TOTAL BILIRUBIN mg/dL  --   --  1.82*   ALK PHOS U/L  --   --  85   ALT U/L  --   --  17   AST U/L  --   --  14   GLUCOSE RANDOM mg/dL 108   < > 116    < > = values in this interval not displayed.     Results from last 7 days   Lab Units 03/02/25  1742   INR  0.97             Results from last 7 days   Lab Units 03/02/25  1742   LACTIC ACID mmol/L 1.1       Recent Cultures (last 7 days):   Results from last 7 days   Lab Units 03/02/25  1757 03/02/25  1751 03/02/25  1742   BLOOD CULTURE   --  No Growth at 48 hrs. No Growth at 48 hrs.   URINE CULTURE  >100,000 cfu/ml Klebsiella oxytoca*  --   --        Imaging Results Review: No pertinent imaging studies reviewed.  Other Study Results Review: No additional pertinent studies reviewed.    Last 24 Hours Medication List:     Current Facility-Administered Medications:     acetaminophen (TYLENOL) tablet 650 mg, Q6H PRN    atorvastatin (LIPITOR) tablet 40 mg, Nightly    ceftriaxone (ROCEPHIN) 1 g/50 mL in  dextrose IVPB, Q24H, Last Rate: 1,000 mg (03/04/25 1650)    enoxaparin (LOVENOX) subcutaneous injection 40 mg, Daily    hydroCHLOROthiazide tablet 25 mg, QAM    losartan (COZAAR) tablet 100 mg, Daily    tamsulosin (FLOMAX) capsule 0.4 mg, Daily With Dinner    Administrative Statements   Today, Patient Was Seen By: MATTI Gregory  I have spent a total time of 48 minutes in caring for this patient on the day of the visit/encounter including Diagnostic results, Risks and benefits of tx options, Instructions for management, Patient and family education, Importance of tx compliance, Risk factor reductions, Impressions, Counseling / Coordination of care, Documenting in the medical record, Reviewing/placing orders in the medical record (including tests, medications, and/or procedures), Obtaining or reviewing history  , and Communicating with other healthcare professionals .    **Please Note: This note may have been constructed using a voice recognition system.**

## 2025-03-05 NOTE — ASSESSMENT & PLAN NOTE
Patient presented to the ED with complaints of fever.  Meeting SIRS criteria on admission with tachycardia and leukocytosis.  Secondary to urinary tract infection secondary to chronic indwelling urinary catheter  UA on admission noted to be positive for nitrites, leukocytes with innumerable WBC.  Urine culture >100,000 klebsiella oxytoca   Blood cultures negative x 48 hrs  Continue with IV Ceftriaxone at this time  Given ongoing fever spikes, will obtain CT imaging Abd/Pelvis with contrast to evaluate for possible prostatitis, or possibly even pyelonephritis.  Plan to treat x 10 days.

## 2025-03-05 NOTE — ASSESSMENT & PLAN NOTE
New rich placed on 2/9 for urinary retention secondary to BPH  Was replaced on 3/1 prior to admission, due to leakage, following replacement, he noted some hematuria but it resolved.

## 2025-03-05 NOTE — CASE MANAGEMENT
Case Management Discharge Planning Note    Patient name Trent Fonseca  Location /-01 MRN [id number]  : 1960 Date 3/5/2025       Current Admission Date: 3/2/2025  Current Admission Diagnosis:Sepsis without acute organ dysfunction (HCC)   Patient Active Problem List    Diagnosis Date Noted Date Diagnosed    Benign prostate hyperplasia 2025     Sepsis without acute organ dysfunction (HCC) 2025     Urinary tract infection associated with indwelling urethral catheter  (HCC) 2025     Prediabetes 2025     Adrenal mass greater than 4 cm in diameter (HCC) 2025     Primary hypertension 2025     Hyperlipidemia 2025     Family hx of colon cancer 2025     Elevated PSA 2025       LOS (days): 2  Geometric Mean LOS (GMLOS) (days): 3.5  Days to GMLOS:1.7     OBJECTIVE:  Risk of Unplanned Readmission Score: 9.05         Current admission status: Inpatient   Preferred Pharmacy:   [id number] - [address] - [phone]  [place] [address]  Phone: [phone] Fax: [phone]    Primary Care Provider: Sierra Qurioz DO    Primary Insurance: MEDICARE  Secondary Insurance: COMMERCIAL MISCELLANEOUS    DISCHARGE DETAILS:        IMM Given (Date):: 25  IMM Given to:: Patient (CM reviewed IMM with pt at bedside. Patient reported understanding their rights and declined any questions or concerns. Patient was given a copy and signed copy placed in medical records.)

## 2025-03-06 ENCOUNTER — TRANSITIONAL CARE MANAGEMENT (OUTPATIENT)
Dept: INTERNAL MEDICINE CLINIC | Facility: CLINIC | Age: 65
End: 2025-03-06

## 2025-03-06 VITALS
SYSTOLIC BLOOD PRESSURE: 151 MMHG | TEMPERATURE: 97.5 F | RESPIRATION RATE: 18 BRPM | BODY MASS INDEX: 27.9 KG/M2 | DIASTOLIC BLOOD PRESSURE: 83 MMHG | OXYGEN SATURATION: 97 % | HEIGHT: 70 IN | WEIGHT: 194.89 LBS | HEART RATE: 95 BPM

## 2025-03-06 LAB
ANION GAP SERPL CALCULATED.3IONS-SCNC: 9 MMOL/L (ref 4–13)
BUN SERPL-MCNC: 8 MG/DL (ref 5–25)
CALCIUM SERPL-MCNC: 8.7 MG/DL (ref 8.4–10.2)
CHLORIDE SERPL-SCNC: 100 MMOL/L (ref 96–108)
CO2 SERPL-SCNC: 27 MMOL/L (ref 21–32)
CREAT SERPL-MCNC: 0.81 MG/DL (ref 0.6–1.3)
ERYTHROCYTE [DISTWIDTH] IN BLOOD BY AUTOMATED COUNT: 12.6 % (ref 11.6–15.1)
GFR SERPL CREATININE-BSD FRML MDRD: 92 ML/MIN/1.73SQ M
GLUCOSE SERPL-MCNC: 113 MG/DL (ref 65–140)
HCT VFR BLD AUTO: 39.3 % (ref 36.5–49.3)
HGB BLD-MCNC: 13.3 G/DL (ref 12–17)
MAGNESIUM SERPL-MCNC: 2 MG/DL (ref 1.9–2.7)
MCH RBC QN AUTO: 29.7 PG (ref 26.8–34.3)
MCHC RBC AUTO-ENTMCNC: 33.8 G/DL (ref 31.4–37.4)
MCV RBC AUTO: 88 FL (ref 82–98)
PLATELET # BLD AUTO: 222 THOUSANDS/UL (ref 149–390)
PMV BLD AUTO: 9.6 FL (ref 8.9–12.7)
POTASSIUM SERPL-SCNC: 3.3 MMOL/L (ref 3.5–5.3)
RBC # BLD AUTO: 4.48 MILLION/UL (ref 3.88–5.62)
SODIUM SERPL-SCNC: 136 MMOL/L (ref 135–147)
WBC # BLD AUTO: 6.53 THOUSAND/UL (ref 4.31–10.16)

## 2025-03-06 PROCEDURE — 80048 BASIC METABOLIC PNL TOTAL CA: CPT | Performed by: NURSE PRACTITIONER

## 2025-03-06 PROCEDURE — 85027 COMPLETE CBC AUTOMATED: CPT | Performed by: NURSE PRACTITIONER

## 2025-03-06 PROCEDURE — 83735 ASSAY OF MAGNESIUM: CPT | Performed by: NURSE PRACTITIONER

## 2025-03-06 PROCEDURE — 99239 HOSP IP/OBS DSCHRG MGMT >30: CPT | Performed by: NURSE PRACTITIONER

## 2025-03-06 RX ORDER — POTASSIUM CHLORIDE 1500 MG/1
40 TABLET, EXTENDED RELEASE ORAL ONCE
Status: COMPLETED | OUTPATIENT
Start: 2025-03-06 | End: 2025-03-06

## 2025-03-06 RX ORDER — CEFPODOXIME PROXETIL 200 MG/1
200 TABLET, FILM COATED ORAL 2 TIMES DAILY
Qty: 14 TABLET | Refills: 0 | Status: SHIPPED | OUTPATIENT
Start: 2025-03-06 | End: 2025-03-14 | Stop reason: ALTCHOICE

## 2025-03-06 RX ADMIN — LOSARTAN POTASSIUM 100 MG: 50 TABLET, FILM COATED ORAL at 08:56

## 2025-03-06 RX ADMIN — HYDROCHLOROTHIAZIDE 25 MG: 25 TABLET ORAL at 08:57

## 2025-03-06 RX ADMIN — ENOXAPARIN SODIUM 40 MG: 40 INJECTION SUBCUTANEOUS at 08:59

## 2025-03-06 RX ADMIN — POTASSIUM CHLORIDE 40 MEQ: 1500 TABLET, EXTENDED RELEASE ORAL at 08:56

## 2025-03-06 NOTE — DISCHARGE SUMMARY
Discharge Summary - Hospitalist   Name: Trent Fonseca 64 y.o. male I MRN: [id number]  Unit/Bed#: -01 I Date of Admission: 3/2/2025   Date of Service: 3/6/2025 I Hospital Day: 3     Assessment & Plan  Sepsis without acute organ dysfunction (HCC)  Patient presented to the ED with complaints of fever.  Meeting SIRS criteria on admission with tachycardia and leukocytosis.  Secondary to urinary tract infection secondary to chronic indwelling urinary catheter  UA on admission noted to be positive for nitrites, leukocytes with innumerable WBC.  Urine culture >100,000 klebsiella oxytoca   Blood cultures negative x 72 hrs  Treated x 3 days with IV Ceftriaxone  Afebrile x 24 hours  CT Abd/Pelvis (3/5/25): Urinary bladder mostly collapsed around Rich catheter, limiting evaluation. 4.3 x 4.1 x 3.5 cm soft tissue density lesion along the posterior wall of the urinary bladder, inseparable from the prostate gland, as on the prior study. This may represent significant intravesical extension of prostatomegaly, however an underlying urothelial mass is not entirely excluded. Urology consultation recommended if not already obtained.  Plan to treat x 10 days - Initiated on Po Vantin x 7 more days.  Follow-up with Urology OP - Appt already scheduled 3/12.  Urinary tract infection associated with indwelling urethral catheter  (HCC)  New rich placed on 2/9 for urinary retention secondary to BPH  Was replaced on 3/1 prior to admission, due to leakage, following replacement, he noted some hematuria but it resolved.  Benign prostate hyperplasia  History of BPH status post Rich placement due to urinary retention.  Continue with the Rich catheter  Follow-up with urology     Medical Problems       Resolved Problems  Date Reviewed: 2/26/2025          Resolved    Hyponatremia 3/5/2025     Resolved by  MATTI Gregory        Discharging Physician / Practitioner: MATTI Gregory  PCP: Sierra Quiroz, DO  Admission  Date:   Admission Orders (From admission, onward)       Ordered        03/03/25 1642  INPATIENT ADMISSION  Once            03/02/25 1847  Place in Observation  Once                          Discharge Date: 03/06/25    Consultations During Hospital Stay:  None    Procedures Performed:   None    Significant Findings / Test Results:   CT abdomen pelvis w contrast   Final Result by Jamee Johnson MD (03/05 1742)      1) Urinary bladder mostly collapsed around Rich catheter, limiting evaluation. 4.3 x 4.1 x 3.5 cm soft tissue density lesion along the posterior wall of the urinary bladder, inseparable from the prostate gland, as on the prior study. This may represent    significant intravesical extension of prostatomegaly, however an underlying urothelial mass is not entirely excluded. Urology consultation recommended if not already obtained.      2) No evidence of pyelonephritis.      3) No other acute abdominal or pelvic pathology.      4) Unchanged appearance of the left adrenal gland as in February 2025, with dense calcifications of the lateral limb, likely related to sequela of a remote insult and a 6.3 x 6.2 x 5.1 cm fat-containing lesion, as discussed above. This lesion likely    represents a myolipoma. Given large size recommend surgical consultation.      5) Additional findings as above.                  Workstation performed: [id number]             Incidental Findings:   No new findings from February CT imaging, patient and wife already aware of findings.    Test Results Pending at Discharge (will require follow up):   None     Outpatient Tests Requested:  Follow-up with PCP within 1 wk  Follow-up with Urology on 3/12    Complications:  None    Reason for Admission: Sepsis    Hospital Course:   Trent Fonseca is a 64 y.o. male patient with past medical history of BPH with Urinary Retention with rich placement on 2/9/25 who originally presented to the hospital on 3/2/2025 due to fever at home.  Meeting SIRS  "criteria on admission evidenced by tachycardia and leukocytosis, secondary to complicated UTI from indwelling rich catheter. Patient reportedly had it replaced on 3/1 due to leakage around the catheter, which he noted some gross hematuria which resolved but had fevers after that which prompted visit to ER.  Patient was noted to have an abnormal UA with nitrites, leukocytes with innumerable WBC and was admitted for IV abx.  Initiated on IV Ceftriaxone, urine culture did grow >100,000 klebsiella oxytoca.  During his hospital stay, he did have low grade fevers which prompted CT imaging of the abdomen/pelvis with contrast which was completed on 3/5, results noted above.  Findings were consistent with previous CT imaging in February.  Patient was afebrile x 24 hours prior to discharge with no further complaints of chills.  Transitioned to PO Vantin on discharge to complete a course of 10 days of abx.    Discussed use of cranberry supplementation as well as probiotics.  Discussed proper rich catheter care.    Vital signs stable, labs stable.  Medically stable for discharge.      Please see above list of diagnoses and related plan for additional information.     Condition at Discharge: stable    Discharge Day Visit / Exam:   Subjective:  \"I feel great\"    Vitals: Blood Pressure: 151/83 (03/06/25 0749)  Pulse: 95 (03/06/25 0749)  Temperature: 97.5 °F (36.4 °C) (03/06/25 0749)  Temp Source: Oral (03/05/25 1507)  Respirations: 18 (03/06/25 0749)  Height: 5' 10\" (177.8 cm) (03/03/25 1301)  Weight - Scale: 88.4 kg (194 lb 14.2 oz) (03/03/25 1301)  SpO2: 97 % (03/06/25 0749)    Physical Exam  Vitals and nursing note reviewed.   Constitutional:       General: He is not in acute distress.  Cardiovascular:      Rate and Rhythm: Normal rate.      Pulses: Normal pulses.   Pulmonary:      Effort: Pulmonary effort is normal.   Abdominal:      General: Bowel sounds are normal. There is no distension.      Palpations: Abdomen is soft. "      Tenderness: There is no abdominal tenderness.   Genitourinary:     Comments: Bella catheter in place - clear yellow urine noted  Musculoskeletal:         General: Normal range of motion.   Skin:     General: Skin is warm and dry.   Neurological:      Mental Status: He is alert and oriented to person, place, and time.   Psychiatric:         Mood and Affect: Mood normal.          Discussion with Family: Updated  (wife) at bedside.    Discharge instructions/Information to patient and family:   See after visit summary for information provided to patient and family.      Provisions for Follow-Up Care:  See after visit summary for information related to follow-up care and any pertinent home health orders.      Mobility at time of Discharge:   Basic Mobility Inpatient Raw Score: 24  JH-HLM Goal: 8: Walk 250 feet or more  JH-HLM Achieved: 8: Walk 250 feet ot more  HLM Goal achieved. Continue to encourage appropriate mobility.     Disposition:   Home    Planned Readmission: None    Discharge Medications:  See after visit summary for reconciled discharge medications provided to patient and/or family.      Administrative Statements   Discharge Statement:  I have spent a total time of 35 minutes in caring for this patient on the day of the visit/encounter. >30 minutes of time was spent on: Diagnostic results, Risks and benefits of tx options, Instructions for management, Patient and family education, Importance of tx compliance, Risk factor reductions, Impressions, Counseling / Coordination of care, Documenting in the medical record, Reviewing / ordering tests, medicine, procedures  , and Communicating with other healthcare professionals .    **Please Note: This note may have been constructed using a voice recognition system**

## 2025-03-06 NOTE — ASSESSMENT & PLAN NOTE
Patient presented to the ED with complaints of fever.  Meeting SIRS criteria on admission with tachycardia and leukocytosis.  Secondary to urinary tract infection secondary to chronic indwelling urinary catheter  UA on admission noted to be positive for nitrites, leukocytes with innumerable WBC.  Urine culture >100,000 klebsiella oxytoca   Blood cultures negative x 72 hrs  Treated x 3 days with IV Ceftriaxone  Afebrile x 24 hours  CT Abd/Pelvis (3/5/25): Urinary bladder mostly collapsed around Bella catheter, limiting evaluation. 4.3 x 4.1 x 3.5 cm soft tissue density lesion along the posterior wall of the urinary bladder, inseparable from the prostate gland, as on the prior study. This may represent significant intravesical extension of prostatomegaly, however an underlying urothelial mass is not entirely excluded. Urology consultation recommended if not already obtained.  Plan to treat x 10 days - Initiated on Po Vantin x 7 more days.  Follow-up with Urology OP - Appt already scheduled 3/12.

## 2025-03-08 LAB
BACTERIA BLD CULT: NORMAL
BACTERIA BLD CULT: NORMAL

## 2025-03-12 ENCOUNTER — OFFICE VISIT (OUTPATIENT)
Dept: UROLOGY | Facility: CLINIC | Age: 65
End: 2025-03-12
Payer: MEDICARE

## 2025-03-12 VITALS
OXYGEN SATURATION: 98 % | WEIGHT: 193 LBS | BODY MASS INDEX: 27.69 KG/M2 | HEART RATE: 115 BPM | SYSTOLIC BLOOD PRESSURE: 138 MMHG | DIASTOLIC BLOOD PRESSURE: 80 MMHG

## 2025-03-12 DIAGNOSIS — R33.8 BENIGN PROSTATIC HYPERPLASIA WITH URINARY RETENTION: Primary | ICD-10-CM

## 2025-03-12 DIAGNOSIS — N40.1 BENIGN PROSTATIC HYPERPLASIA WITH URINARY RETENTION: Primary | ICD-10-CM

## 2025-03-12 PROCEDURE — 76872 US TRANSRECTAL: CPT | Performed by: UROLOGY

## 2025-03-12 PROCEDURE — 52000 CYSTOURETHROSCOPY: CPT | Performed by: UROLOGY

## 2025-03-12 PROCEDURE — 99215 OFFICE O/P EST HI 40 MIN: CPT | Performed by: UROLOGY

## 2025-03-12 PROCEDURE — 87086 URINE CULTURE/COLONY COUNT: CPT | Performed by: UROLOGY

## 2025-03-12 RX ORDER — CIPROFLOXACIN 2 MG/ML
400 INJECTION, SOLUTION INTRAVENOUS
OUTPATIENT
Start: 2025-03-13 | End: 2025-03-14

## 2025-03-12 NOTE — PROGRESS NOTES
Referring Physician: Sierra Quiroz DO  A copy of this note was sent to the referring physician.       Diagnoses and all orders for this visit:    Benign prostatic hyperplasia with urinary retention  -     Urine culture  -     Cystoscopy  -     Case request operating room: PROSTATECTOMY,SUPRAPUBIC LAPAROSCOPIC WITH ROBOTICS; Standing  -     Case request operating room: PROSTATECTOMY,SUPRAPUBIC LAPAROSCOPIC WITH ROBOTICS    Other orders  -     Place sequential compression device; Standing  -     ciprofloxacin (CIPRO) IVPB (premix in 5% dextrose) 400 mg 200 mL            Assessment and plan:       1.  Refractory urinary retention secondary to high-volume trilobar BPH  -Catheter dependent since [place], failed 1 prior void trial    2.  6 centimeter adrenal myolipoma  -Will require observation, discussed the possibility of requiring adrenalectomy in the future  -Should be readdressed after his urinary retention has been relieved    #3 history of open appendectomy with a right lower quadrant incision    Discussed options with the patient.  I do not believe he will have success with an additional void trial without surgical intervention.    I offered 2 options: A robotic suprapubic prostatectomy versus a high-volume TURP.  Pros and cons of each were discussed in detail.  I counseled the patient that the suprapubic prostatectomy is likely the operation of choice and is associated with the lowest risk of requiring retreatment though there would be a higher wait time and due to his prior open appendectomy he is at an increased risk of surgical complications secondary to a requisite enterolysis.    Pros and cons of each were reviewed.  Patient has elected to move forward scheduling a robotic suprapubic prostatectomy and I agree this is the most prudent option for him.  He understands this will be performed by one of my partners.  I did obtain informed consent today including the increased risks of adverse surgical  outcomes due to his prior abdominal surgical history    We will organize a Bella catheter exchange in 6 weeks time in this office per his request and he is scheduled to see cardiology for general medical clearance due to his newly diagnosed CAD.  I have spent a total time of 60 minutes in caring for this patient on the day of the visit/encounter including Diagnostic results, Prognosis, Risks and benefits of tx options, Impressions, Counseling / Coordination of care, and Reviewing/placing orders in the medical record (including tests, medications, and/or procedures).     Joselito Larson MD      Chief Complaint     Acute urinary retention      History of Present Illness     Trent Fonseca is a 64 y.o. initially referred for an adrenal myolipoma also with a new issue of acute urinary retention    Detailed Urologic History     - please refer to HPI    Review of Systems     Review of Systems   Constitutional: Negative for activity change and fatigue.   HENT: Negative for congestion.    Eyes: Negative for visual disturbance.   Respiratory: Negative for shortness of breath and wheezing.    Cardiovascular: Negative for chest pain and leg swelling.   Gastrointestinal: Negative for abdominal pain.   Endocrine: Negative for polyuria.   Genitourinary: Negative for dysuria, flank pain, hematuria and urgency.   Musculoskeletal: Negative for back pain.   Allergic/Immunologic: Negative for immunocompromised state.   Neurological: Negative for dizziness and numbness.   Psychiatric/Behavioral: Negative for dysphoric mood.   All other systems reviewed and are negative.    AUA SYMPTOM SCORE      Flowsheet Row Most Recent Value   AUA SYMPTOM SCORE    How often have you had a sensation of not emptying your bladder completely after you finished urinating? 0 (P)     How often have you had to urinate again less than two hours after you finished urinating? 0 (P)     How often have you found you stopped and started again several times  when you urinate? 0 (P)     How often have you found it difficult to postpone urination? 0 (P)     How often have you had a weak urinary stream? 0 (P)     How often have you had to push or strain to begin urination? 0 (P)     How many times did you most typically get up to urinate from the time you went to bed at night until the time you got up in the morning? 0 (P)     Quality of Life: If you were to spend the rest of your life with your urinary condition just the way it is now, how would you feel about that? 3 (P)     AUA SYMPTOM SCORE 0 (P)                Allergies     No Known Allergies    Physical Exam       Physical Exam  Constitutional:       General: He is not in acute distress.     Appearance: He is well-developed.   HENT:      Head: Normocephalic and atraumatic.   Cardiovascular:      Comments: Negative lower extremity edema  Pulmonary:      Effort: Pulmonary effort is normal.      Breath sounds: Normal breath sounds.   Abdominal:      Palpations: Abdomen is soft.   Musculoskeletal:         General: Normal range of motion.      Cervical back: Normal range of motion.   Skin:     General: Skin is warm.   Neurological:      Mental Status: He is alert and oriented to person, place, and time.   Psychiatric:         Behavior: Behavior normal.           Vital Signs  Vitals:    03/12/25 1508   BP: 138/80   BP Location: Left arm   Patient Position: Sitting   Cuff Size: Large   Pulse: (!) 115   SpO2: 98%   Weight: 87.5 kg (193 lb)         Current Medications       Current Outpatient Medications:     aspirin 81 mg chewable tablet, Chew 81 mg daily, Disp: , Rfl:     atorvastatin (LIPITOR) 40 mg tablet, Take 1 tablet (40 mg total) by mouth at bedtime, Disp: 100 tablet, Rfl: 0    cefpodoxime (VANTIN) 200 mg tablet, Take 1 tablet (200 mg total) by mouth 2 (two) times a day for 7 days, Disp: 14 tablet, Rfl: 0    hydroCHLOROthiazide 25 mg tablet, Take 1 tablet (25 mg total) by mouth every morning, Disp: 90 tablet, Rfl: 2     losartan (COZAAR) 100 MG tablet, Take 1 tablet (100 mg total) by mouth in the morning, Disp: 90 tablet, Rfl: 2    tamsulosin (FLOMAX) 0.4 mg, TAKE 1 CAPSULE(0.4 MG) BY MOUTH DAILY WITH DINNER, Disp: 90 capsule, Rfl: 0      Active Problems     Patient Active Problem List   Diagnosis    Primary hypertension    Hyperlipidemia    Family hx of colon cancer    Elevated PSA    Prediabetes    Adrenal mass greater than 4 cm in diameter (HCC)    Benign prostate hyperplasia    Sepsis without acute organ dysfunction (HCC)    Urinary tract infection associated with indwelling urethral catheter  (HCC)         Past Medical History     History reviewed. No pertinent past medical history.      Surgical History     Past Surgical History:   Procedure Laterality Date    APPENDECTOMY  2012    COLON SURGERY      2011,2018,2024         Family History     Family History   Problem Relation Age of Onset    Heart disease Father     Heart disease Paternal Grandmother          Social History     Social History     Social History     Tobacco Use   Smoking Status Never   Smokeless Tobacco Never         Pertinent Lab Values     Lab Results   Component Value Date    CREATININE 0.81 03/06/2025       Lab Results   Component Value Date    PSA 7.418 (H) 02/12/2025       @RESULTRCNT(1H])@      Pertinent Imaging       CT abdomen pelvis w contrast  Result Date: 3/5/2025  Narrative: CT ABDOMEN AND PELVIS WITH IV CONTRAST INDICATION: Fever, UTI with indwelling catheter. COMPARISON: CT of the abdomen and pelvis from 2/10/2025. TECHNIQUE: CT examination of the abdomen and pelvis was performed. Multiplanar 2D reformatted images were created from the source data. This examination, like all CT scans performed in the [facility], was performed utilizing techniques to minimize radiation dose exposure, including the use of iterative reconstruction and automated exposure control. Radiation dose length product (DLP) for this visit: 665 mGy-cm IV  Contrast: 100 mL of iohexol Enteric Contrast: Enteric contrast was not administered. FINDINGS: LOWER CHEST: No clinically significant findings in the imaged lung bases. No pleural effusions. LIVER/BILIARY TREE: Normal liver morphology. Small simple appearing cyst in the right hepatic lobe. No biliary ductal dilation. GALLBLADDER: There is cholelithiasis, however no gallbladder wall thickening or pericholecystic fluid to suggest acute cholecystitis. SPLEEN: Unremarkable. PANCREAS: Unremarkable. No dilatation of the main pancreatic duct. ADRENAL GLANDS: Unremarkable right adrenal gland. No normal left adrenal gland parenchyma is seen. There is a 6.3 x 6.2 x 5.1 cm lesion in place of the left adrenal gland, likely arising from the medial limb, mostly comprised of bulk fat, with some small  peripheral calcifications. The region of the lateral limb of the adrenal gland is heavily calcified. These findings are unchanged from the February 2025 CT. KIDNEYS/URETERS: Symmetric renal enhancement.  No intrarenal or ureteral calculi. No hydronephrosis. There are subcentimeter hypodense lesions in both kidneys, too small to accurately characterize with CT technique, however statistically likely cysts. STOMACH AND BOWEL:  Evaluation of the gastrointestinal tract is somewhat limited by underdistention and lack of oral contrast. No bowel obstruction or convincing inflammation. APPENDIX:  The appendix is not visualized, however there are no secondary findings of appendicitis. ABDOMINOPELVIC CAVITY:  No ascites or pneumoperitoneum. LYMPH NODES: No abdominal or pelvic lymphadenopathy. VESSELS: Normal caliber aorta. Patent major branch vessels. Scattered atherosclerotic calcifications in the abdominal aorta and its major branches. REPRODUCTIVE ORGANS: The prostate gland is significantly enlarged, measuring approximately 8.1 x 5.9 x 5.0 cm (volume of 125 mL). The seminal vesicles are also prominent. URINARY BLADDER:  The urinary bladder  is mostly collapsed around a Bella catheter, limiting evaluation. There is circumferential wall thickening of the urinary bladder at a proportion for degree of underdistention. There is a soft tissue density lesion measuring at least 4.3 x 4.1 x 3.5 cm along the posterior wall of the urinary bladder, inseparable from the prostate gland. ABDOMINAL WALL/INGUINAL REGIONS: No ventral abdominal wall hernia. MUSCULOSKELETAL:  No focal aggressive osseous lesions. Several subcentimeter densely sclerotic lesions are seen in the right iliac bone, left acetabulum and both proximal femurs, statistically likely representing bone islands.     Impression: 1) Urinary bladder mostly collapsed around Bella catheter, limiting evaluation. 4.3 x 4.1 x 3.5 cm soft tissue density lesion along the posterior wall of the urinary bladder, inseparable from the prostate gland, as on the prior study. This may represent significant intravesical extension of prostatomegaly, however an underlying urothelial mass is not entirely excluded. Urology consultation recommended if not already obtained. 2) No evidence of pyelonephritis. 3) No other acute abdominal or pelvic pathology. 4) Unchanged appearance of the left adrenal gland as in February 2025, with dense calcifications of the lateral limb, likely related to sequela of a remote insult and a 6.3 x 6.2 x 5.1 cm fat-containing lesion, as discussed above. This lesion likely represents a myolipoma. Given large size recommend surgical consultation. 5) Additional findings as above. Workstation performed: [id number]     CT coronary calcium score  Result Date: 2/23/2025  Narrative: CT CORONARY ARTERY CALCIUM SCREENING WITHOUT INTRAVENOUS CONTRAST INDICATION: I70.90: Unspecified atherosclerosis Z82.49: Family history of ischemic heart disease and other diseases of the circulatory system. Assess for calcific coronary plaque. Patient Age: 64 years. Patient Gender: Male. COMPARISON: None. TECHNIQUE: Low  "radiation dose computed tomography of the heart was performed with EKG gating, suspended respiration, and without intravenous contrast. This examination, like all CT scans performed in the [facility], was performed utilizing techniques to minimize radiation dose exposure, including the use of iterative reconstruction and automated exposure control. Radiation dose length product (DLP) for this visit: 128 mGy-cm Postprocessing was performed on a computer workstation to measure the amount of calcium in the coronary arteries. Imaging was limited to the heart and the immediately adjacent lungs. FINDINGS: Coronary artery calcium score breakdown is as follows: Left main coronary artery: 0 Left anterior descending coronary artery and diagonal branches: 259 Left circumflex coronary artery and left marginal branches: 113 Right coronary artery and right marginal branches: 1892 Posterior descending coronary artery: 74 TOTAL coronary calcium score: 2338 Calcium score PERCENTILE of age, race, and gender matched database participants in the Multi-Ethnic Study of Atherosclerosis (HORTA) trial: 97th HEART/GREAT VESSELS: Minimal aortic valve calcification. Otherwise no significant low radiation dose noncontrast CT abnormality of the heart. No aneurysmal dilatation within visualized segments of thoracic aorta. EXTRACARDIAC FINDINGS: No significant findings identified on limited small field of view low radiation dose noncontrast images of the chest at the level of the heart.     Impression: Total coronary calcium score equals 2338. For more useful information regarding the prognostic significance of the calcium score, please consult the calculator at the website http://[email or web address]-nhlbi.org/CACReference.aspx. Workstation performed: [id number]         Portions of the record may have been created with voice recognition software.  Occasional wrong word or \"sound a like\" substitutions may have occurred due to the inherent " limitations of voice recognition software.  In addition some of the content generated from this outpatient encounter includes information designed for patient education and/or communication back to the referring provider.  Read the chart carefully and recognize, using context, where substitutions have occurred.

## 2025-03-12 NOTE — PROGRESS NOTES
Cystoscopy     Date/Time  3/12/2025 3:30 PM     Performed by  Joselito Larson MD   Authorized by  Joselito Larson MD         Procedure Details:  Procedure type: cystoscopy       Office Cystoscopy Procedure Note    Indication:    Refractory urinary retention    Informed consent   The risks, benefits, complications, treatment options, and expected outcomes were discussed with the patient. The patient concurred with the proposed plan and provided informed consent.    Anesthesia  Lidocaine jelly 2%    Procedure  The patient was placed in the supineposition, was prepped and draped in the usual manner using sterile technique, and 2% lidocaine jelly instilled into the urethra.  A 17 F flexible cystoscope was then inserted into the urethra and the urethra and bladder carefully examined.  The following findings were noted:    Findings:  Urethra:  Normal  Prostate:  Trilobar hyperplasia.  Significant intravesical prostatic protrusion with a greater than 180 degree semicircumferential extension into the bladder.  In general a suprapubic prostatectomy is my recommended intervention.  Bladder:  Normal, no trabeculation.  Overall appears to be well-preserved with function  Ureteral orifices:  Normal  Other findings:  None       Office TRUS    Indication    Prostate volumetrics for surgical planning    Transrectal ultrasonography  After completing the cystoscopy, the patient was placed in the left lateral decubitus position. After an attentive digital rectal examination, a 7.5 mHz sidefire ultrasound probe was gently inserted into the rectum and biplanar imaging of the prostate was done with the findings noted below. Images were taken of any abnormal findings and also to document prostate size.    Bladder  The bladder base appeared normal.    Prostate      Ultrasound size measurements:  -Volume:  >120 cm3.  The length of the prostate is somewhat outside the field-of-view of the ultrasound    Ultrasound  findings:  -Cysts: None  -Masses: None  -Median lobe: absent    New 18 Turkmen Bella catheter was placed without resistance at the conclusion of the procedure.      Specimens:  sterile urine culture collected through cystoscope                 Complications:    None; patient tolerated the procedure well           Disposition: To home after 30 minute observation.           Condition: Stable

## 2025-03-13 LAB — BACTERIA UR CULT: NORMAL

## 2025-03-14 ENCOUNTER — OFFICE VISIT (OUTPATIENT)
Dept: FAMILY MEDICINE CLINIC | Facility: CLINIC | Age: 65
End: 2025-03-14
Payer: MEDICARE

## 2025-03-14 VITALS
HEIGHT: 70 IN | BODY MASS INDEX: 27.35 KG/M2 | TEMPERATURE: 98.6 F | DIASTOLIC BLOOD PRESSURE: 82 MMHG | OXYGEN SATURATION: 97 % | WEIGHT: 191 LBS | HEART RATE: 97 BPM | SYSTOLIC BLOOD PRESSURE: 152 MMHG

## 2025-03-14 DIAGNOSIS — Z76.89 ENCOUNTER FOR SUPPORT AND COORDINATION OF TRANSITION OF CARE: Primary | ICD-10-CM

## 2025-03-14 DIAGNOSIS — N39.0 URINARY TRACT INFECTION ASSOCIATED WITH INDWELLING URETHRAL CATHETER, SEQUELA: ICD-10-CM

## 2025-03-14 DIAGNOSIS — A41.9 SEPSIS WITHOUT ACUTE ORGAN DYSFUNCTION, DUE TO UNSPECIFIED ORGANISM (HCC): ICD-10-CM

## 2025-03-14 DIAGNOSIS — T83.511S URINARY TRACT INFECTION ASSOCIATED WITH INDWELLING URETHRAL CATHETER, SEQUELA: ICD-10-CM

## 2025-03-14 PROCEDURE — 99495 TRANSJ CARE MGMT MOD F2F 14D: CPT | Performed by: PHYSICIAN ASSISTANT

## 2025-03-14 NOTE — ASSESSMENT & PLAN NOTE
Pt treated with IV and then oral antibiotics.   Feels much better  Pt saw urologist yesterday and is to have laparoscopic with robotics prostatectomy

## 2025-03-14 NOTE — PROGRESS NOTES
Transition of Care Visit  Name: Trent Fonseca      : 1960      MRN: [id number]  Encounter Provider: Christina Salvador PA-C  Encounter Date: 3/14/2025   Encounter department: [facility] [address]    Assessment & Plan  Encounter for support and coordination of transition of care  Pt admitted 2025-2025       Urinary tract infection associated with indwelling urethral catheter, sequela  Pt treated with IV and then oral antibiotics.   Feels much better  Pt saw urologist yesterday and is to have laparoscopic with robotics prostatectomy        Sepsis without acute organ dysfunction, due to unspecified organism (HCC)  Had daily fevers from UTI   Now resolved            History of Present Illness     Transitional Care Management Review:   Trent Fonseca is a 64 y.o. male here for TCM follow up.     During the TCM phone call patient stated:  TCM Call (since 2025)     Date and time call was made  3/6/2025  2:27 PM    Hospital care reviewed  Records reviewed    Patient was hospitialized at  [facility]    Date of Admission  25    Date of discharge  25    Diagnosis  Sepsis without acute organ dysfunction    Disposition  Home    Were the patients medications reviewed and updated  Yes    Current Symptoms  None      TCM Call (since 2025)     Post hospital issues  None    Should patient be enrolled in anticoag monitoring?  No    Scheduled for follow up?  Yes    Did you obtain your prescribed medications  Yes    Do you need help managing your prescriptions or medications  No    Is transportation to your appointment needed  No    Living Arrangements  Spouse or Significiant other    Are you recieving any outpatient services  No    Are you recieving home care services  No    Are you using any community resources  No    Current waiver services  No    Have you fallen in the last 12 months  No    Interperter language line needed  No     "Counseling  Patient    Counseling topics  Activities of daily living; Importance of RX compliance        Pt is for hospital follow up.      Review of Systems   Constitutional:  Negative for chills, diaphoresis and fever.   Respiratory:  Negative for chest tightness and shortness of breath.    Cardiovascular:  Negative for chest pain, palpitations and leg swelling.   Gastrointestinal:  Negative for constipation, diarrhea and nausea.   Genitourinary:  Positive for difficulty urinating. Negative for flank pain and hematuria.        Bella catheter for obstruction from enlarged prostate   Neurological:  Negative for dizziness, light-headedness and headaches.     Objective   /82   Pulse 97   Temp 98.6 °F (37 °C)   Ht 5' 10\" (1.778 m)   Wt 86.6 kg (191 lb)   SpO2 97%   BMI 27.41 kg/m²     Physical Exam  Vitals and nursing note reviewed.   Constitutional:       Appearance: Normal appearance.   Eyes:      Conjunctiva/sclera: Conjunctivae normal.   Cardiovascular:      Rate and Rhythm: Normal rate and regular rhythm.      Pulses: Normal pulses.      Heart sounds: Normal heart sounds.   Pulmonary:      Effort: Pulmonary effort is normal.      Breath sounds: Normal breath sounds.   Abdominal:      General: Abdomen is flat. Bowel sounds are normal. There is no distension.      Palpations: Abdomen is soft. There is no mass.      Tenderness: There is no abdominal tenderness. There is no right CVA tenderness, left CVA tenderness, guarding or rebound.   Musculoskeletal:      Right lower leg: No edema.      Left lower leg: No edema.   Neurological:      Mental Status: He is alert and oriented to person, place, and time.   Psychiatric:         Mood and Affect: Mood normal.         Behavior: Behavior normal.       Medications have been reviewed by provider in current encounter      "

## 2025-03-17 ENCOUNTER — RESULTS FOLLOW-UP (OUTPATIENT)
Dept: FAMILY MEDICINE CLINIC | Facility: CLINIC | Age: 65
End: 2025-03-17

## 2025-03-17 ENCOUNTER — HOSPITAL ENCOUNTER (OUTPATIENT)
Dept: NON INVASIVE DIAGNOSTICS | Facility: CLINIC | Age: 65
Discharge: HOME | End: 2025-03-17
Payer: MEDICARE

## 2025-03-17 VITALS
DIASTOLIC BLOOD PRESSURE: 82 MMHG | HEIGHT: 70 IN | WEIGHT: 194 LBS | HEART RATE: 101 BPM | SYSTOLIC BLOOD PRESSURE: 176 MMHG | BODY MASS INDEX: 27.77 KG/M2 | OXYGEN SATURATION: 98 %

## 2025-03-17 DIAGNOSIS — R94.39 POSITIVE CARDIAC STRESS TEST: Primary | ICD-10-CM

## 2025-03-17 DIAGNOSIS — I25.10 ATHEROSCLEROSIS OF NATIVE CORONARY ARTERY OF NATIVE HEART WITHOUT ANGINA PECTORIS: ICD-10-CM

## 2025-03-17 DIAGNOSIS — I10 PRIMARY HYPERTENSION: ICD-10-CM

## 2025-03-17 DIAGNOSIS — E78.5 HYPERLIPIDEMIA, UNSPECIFIED HYPERLIPIDEMIA TYPE: ICD-10-CM

## 2025-03-17 DIAGNOSIS — R93.1 HIGH CORONARY ARTERY CALCIUM SCORE: ICD-10-CM

## 2025-03-17 LAB
CHEST PAIN STATEMENT: NORMAL
MAX DIASTOLIC BP: 78 MMHG
MAX HR PERCENT: 92 %
MAX HR: 142 BPM
MAX PREDICTED HEART RATE: 154 BPM
PROTOCOL NAME: NORMAL
RATE PRESSURE PRODUCT: NORMAL
SL CV STRESS RECOVERY BP: NORMAL MMHG
SL CV STRESS RECOVERY HR: 110 BPM
SL CV STRESS RECOVERY O2 SAT: 97 %
SL CV STRESS STAGE REACHED: 3
STRESS ANGINA INDEX: 0
STRESS BASELINE BP: NORMAL MMHG
STRESS BASELINE HR: 101 BPM
STRESS O2 SAT REST: 98 %
STRESS PEAK HR: 142 BPM
STRESS POST ESTIMATED WORKLOAD: 10.1 METS
STRESS POST EXERCISE DUR MIN: 7 MIN
STRESS POST EXERCISE DUR MIN: 7 MIN
STRESS POST EXERCISE DUR SEC: 5 SEC
STRESS POST EXERCISE DUR SEC: 5 SEC
STRESS POST O2 SAT PEAK: 98 %
STRESS POST PEAK BP: 230 MMHG
STRESS POST PEAK HR: 142 BPM
STRESS POST PEAK SYSTOLIC BP: 230 MMHG
TARGET HR FORMULA: NORMAL
TEST INDICATION: NORMAL

## 2025-03-17 PROCEDURE — 93017 CV STRESS TEST TRACING ONLY: CPT

## 2025-03-17 PROCEDURE — 93016 CV STRESS TEST SUPVJ ONLY: CPT | Performed by: INTERNAL MEDICINE

## 2025-03-17 PROCEDURE — 93018 CV STRESS TEST I&R ONLY: CPT | Performed by: INTERNAL MEDICINE

## 2025-03-18 ENCOUNTER — TELEPHONE (OUTPATIENT)
Age: 65
End: 2025-03-18

## 2025-03-18 LAB
CHEST PAIN STATEMENT: NORMAL
MAX DIASTOLIC BP: 78 MMHG
MAX PREDICTED HEART RATE: 154 BPM
PROTOCOL NAME: NORMAL
STRESS POST EXERCISE DUR MIN: 7 MIN
STRESS POST EXERCISE DUR SEC: 5 SEC
STRESS POST PEAK HR: 142 BPM
STRESS POST PEAK SYSTOLIC BP: 230 MMHG
TARGET HR FORMULA: NORMAL
TEST INDICATION: NORMAL

## 2025-03-18 NOTE — TELEPHONE ENCOUNTER
I returned call to pt to inform him that I am still working on a date with Dr. Tidwell. I was able to speak with pt and he verbalized understanding and will wait to hear back from me.

## 2025-03-18 NOTE — TELEPHONE ENCOUNTER
Wife of the patient was calling to speak with the OR  to discuss possible dates for the surgery discussed with Dr. Larson on 3/12/2025 with her  (the patient)    Advised that the OR  normally reaches out to the patient in 3 to 5 business days to schedule. But could see if she was currently available.     Tried to reach out via teams. OR  was not available at this time.     Advised the patient's wife of this. She asked if they could get a call back. Advised a message would be sent for the patient

## 2025-03-18 NOTE — TELEPHONE ENCOUNTER
PT calling to schedule surgery. PT aware of 3-5 day time frame      Pt was seen by Dr Larson on  3/12/25    PT can be reached at  [phone]

## 2025-03-19 NOTE — TELEPHONE ENCOUNTER
I am holding 6/10/25 at [facility] with Dr. Tidwell for pt.'s surgery. I sent a email to get approval before scheduling. Once I have approval I will call pt back to confirm scheduling surgery.

## 2025-03-19 NOTE — TELEPHONE ENCOUNTER
----- Message from Jes ROJAS sent at 3/19/2025  9:18 AM EDT -----    ----- Message -----  From: Sierra Quiroz DO  Sent: 3/18/2025   5:29 PM EDT  To: #    Message sent to cardio regarding positive stress test, instructed to direct inquiry for ASAP scheduling to PODS. Please attempt to move patient's appointment sooner than 5/9/25 due to stress test results.     Sierra Quiroz DO  Pending sale to [facility]  3/18/2025 5:29 PM

## 2025-03-20 NOTE — TELEPHONE ENCOUNTER
Spoke with patient and confirmed surgery date of: 6/10/2025  Type of surgery: Robotic SP Prostatectomy  Operating physician: Dr. Larson  Location of surgery: [facility]    Verbally went over prep with patient on: 3/20/2025  NPO  Bowel prep? Yes  Hospital calls afternoon prior with arrival time -Calls Friday afternoon for Monday surgeries  Patient needs ride to and from surgery outpatient w/ 23 HR hold for observation  Pre-op testing to be done 2 weeks prior to surgery  CBC, BMP,PT/INR, PTT, Type and Screen, Urine Culture and EKG  Blood thinners:   NONE- Dr. Tidwell does not h old ASA per Maria Elena K  Clearances needed: Medical    Mailed/emailed to patient on:  Copy of packet scanned into Media on:  Labs in packet  Soap / Bowel prep in packet  Pre-op & Post-op in packet  Dates of H&P and post-op if needed    Consent: in Media  If needed:    Medical/Cardiac Clearance: Medical  Appt with: Sierra Quiroz  Appt date and time: 5/29/2025 at 7:00 AM  Date clearance form faxed:  Best fax number:  Clearance will be in [facility] at the time of appt.    RBC blood bank done on: 3/20/2025

## 2025-03-21 ENCOUNTER — CONSULT (OUTPATIENT)
Age: 65
End: 2025-03-21
Payer: MEDICARE

## 2025-03-21 VITALS
OXYGEN SATURATION: 99 % | BODY MASS INDEX: 27.2 KG/M2 | SYSTOLIC BLOOD PRESSURE: 134 MMHG | DIASTOLIC BLOOD PRESSURE: 66 MMHG | HEIGHT: 70 IN | HEART RATE: 94 BPM | WEIGHT: 190 LBS

## 2025-03-21 DIAGNOSIS — R93.1 AGATSTON CORONARY ARTERY CALCIUM SCORE GREATER THAN 400: ICD-10-CM

## 2025-03-21 DIAGNOSIS — I10 PRIMARY HYPERTENSION: ICD-10-CM

## 2025-03-21 DIAGNOSIS — E78.5 HYPERLIPIDEMIA, UNSPECIFIED HYPERLIPIDEMIA TYPE: ICD-10-CM

## 2025-03-21 DIAGNOSIS — R94.39 POSITIVE CARDIAC STRESS TEST: Primary | ICD-10-CM

## 2025-03-21 DIAGNOSIS — I25.10 ATHEROSCLEROSIS OF NATIVE CORONARY ARTERY OF NATIVE HEART WITHOUT ANGINA PECTORIS: ICD-10-CM

## 2025-03-21 DIAGNOSIS — R94.31 ABNORMAL ECG: ICD-10-CM

## 2025-03-21 DIAGNOSIS — R93.1 HIGH CORONARY ARTERY CALCIUM SCORE: ICD-10-CM

## 2025-03-21 PROCEDURE — 93000 ELECTROCARDIOGRAM COMPLETE: CPT | Performed by: INTERNAL MEDICINE

## 2025-03-21 PROCEDURE — 99204 OFFICE O/P NEW MOD 45 MIN: CPT | Performed by: INTERNAL MEDICINE

## 2025-03-21 NOTE — PROGRESS NOTES
Cardiology Consultation  [facility]      Trent Fonseca  1960  [id number]  [facility] [address] [facility]  [address]  [address]  [place] [address] [phone]    1. Positive cardiac stress test  Ambulatory Referral to Cardiology    POCT ECG    NM myocardial perfusion spect (stress and/or rest)      2. Primary hypertension  Ambulatory Referral to Cardiology    Ambulatory Referral to Cardiology    POCT ECG    NM myocardial perfusion spect (stress and/or rest)    Echo complete w/ contrast if indicated      3. Hyperlipidemia, unspecified hyperlipidemia type  Ambulatory Referral to Cardiology    Ambulatory Referral to Cardiology    POCT ECG    NM myocardial perfusion spect (stress and/or rest)      4. Atherosclerosis of native coronary artery of native heart without angina pectoris  Ambulatory Referral to Cardiology    Ambulatory Referral to Cardiology    POCT ECG      5. High coronary artery calcium score  Ambulatory Referral to Cardiology    POCT ECG    NM myocardial perfusion spect (stress and/or rest)    Echo complete w/ contrast if indicated      6. Abnormal ECG  Echo complete w/ contrast if indicated      7. Agatston coronary artery calcium score greater than 400, 2338- 2/2025               Discussion/Summary    Asymptomatic stress test performed for family hx, report abnormal ST's with exercise, personal review baseline abnormalities in inferior wall and overall slowly upsloping not diagnositic my personal review  Cor calcium score 2338  Htn, controlled  Hyperlipidemia-on statin, controlled    Plan  1) patient is asymptomatic.  His stress test which was performed for family history was overall nondiagnostic.  Nonetheless, he has upcoming prostatectomy surgery planned plan prefer him to have an imaging stress test at this point.  I have also ordered an echocardiogram.  I will follow-up with him after the studies.  Made no  changes otherwise in his medical regimen.  Discussed in detail coronary artery disease, role of coronary calcium scoring for primary prevention which is controversial.  Discussed stable ischemic heart disease as well as acute coronary syndromes.  He has had neither.  He is already on a statin and aspirin and his blood pressure is well-controlled.  I would continue to try to stay active and exercise throughout the week as well as eat a sensible/Mediterranean type diet.      History:     64-year-old referred for abnormal stress test    Patient had an abnormal stress test which was officially performed in February 2025 and read as possible ischemia in the official report    This was performed in an asymptomatic state for family history.  He had a coronary calcium score which showed a score of 2338.  He stated he had 1 done in [place] which was elevated as well at that point.    He is a classical musician for many years.    His father had CABG surgery 4 decades ago.    He denies any shortness of breath PND orthopnea.  He feels well.    He has had an indwelling catheter for an enlarged prostate.  He has an upcoming prostatectomy scheduled on [place] 10.  2025        Patient Active Problem List   Diagnosis    Primary hypertension    Hyperlipidemia    Family hx of colon cancer    Elevated PSA    Prediabetes    Adrenal mass greater than 4 cm in diameter (HCC)    Benign prostate hyperplasia    Sepsis without acute organ dysfunction (HCC)    Urinary tract infection associated with indwelling urethral catheter  (HCC)    Encounter for support and coordination of transition of care    Agatston coronary artery calcium score greater than 400, 2338- 2/2025     History reviewed. No pertinent past medical history.  Social History     Socioeconomic History    Marital status: /Civil Union     Spouse name: Not on file    Number of children: Not on file    Years of education: Not on file    Highest education level: Not on file    Occupational History    Not on file   Tobacco Use    Smoking status: Never    Smokeless tobacco: Never   Vaping Use    Vaping status: Never Used   Substance and Sexual Activity    Alcohol use: Not Currently    Drug use: Not Currently    Sexual activity: Not Currently     Partners: Female   Other Topics Concern    Not on file   Social History Narrative    Not on file     Social Drivers of Health     Financial Resource Strain: Not on file   Food Insecurity: No Food Insecurity (3/3/2025)    Nursing - Inadequate Food Risk Classification     Worried About Running Out of Food in the Last Year: Never true     Ran Out of Food in the Last Year: Never true     Ran Out of Food in the Last Year: Never true   Transportation Needs: No Transportation Needs (3/3/2025)    Nursing - Transportation Risk Classification     Lack of Transportation: Not on file     Lack of Transportation: No   Physical Activity: Not on file   Stress: Not on file   Social Connections: Not on file   Intimate Partner Violence: Unknown (3/3/2025)    Nursing IPS     Feels Physically and Emotionally Safe: Not on file     Physically Hurt by Someone: Not on file     Humiliated or Emotionally Abused by Someone: Not on file     Physically Hurt by Someone: No     Hurt or Threatened by Someone: No   Housing Stability: Unknown (3/3/2025)    Nursing: Inadequate Housing Risk Classification     Has Housing: Not on file     Worried About Losing Housing: Not on file     Unable to Get Utilities: Not on file     Unable to Pay for Housing in the Last Year: No     Has Housin      Family History   Problem Relation Age of Onset    Heart disease Father     Heart disease Paternal Grandmother      Past Surgical History:   Procedure Laterality Date    APPENDECTOMY      COLON SURGERY      ,,       Current Outpatient Medications:     aspirin 81 mg chewable tablet, Chew 81 mg daily, Disp: , Rfl:     atorvastatin (LIPITOR) 40 mg tablet, Take 1 tablet (40 mg total)  "by mouth at bedtime, Disp: 100 tablet, Rfl: 0    hydroCHLOROthiazide 25 mg tablet, Take 1 tablet (25 mg total) by mouth every morning, Disp: 90 tablet, Rfl: 2    losartan (COZAAR) 100 MG tablet, Take 1 tablet (100 mg total) by mouth in the morning, Disp: 90 tablet, Rfl: 2    tamsulosin (FLOMAX) 0.4 mg, TAKE 1 CAPSULE(0.4 MG) BY MOUTH DAILY WITH DINNER, Disp: 90 capsule, Rfl: 0  No Known Allergies    Social, Family and medication history as listed, reviewed and updated as necessary    Labs:   Lab Results   Component Value Date    K 3.3 (L) 03/06/2025     03/06/2025    CO2 27 03/06/2025    BUN 8 03/06/2025    CREATININE 0.81 03/06/2025    CREATININE 0.88 03/05/2025    CALCIUM 8.7 03/06/2025       Lab Results   Component Value Date    WBC 6.53 03/06/2025    HGB 13.3 03/06/2025    HGB 12.5 03/05/2025    HCT 39.3 03/06/2025    HCT 37.8 03/05/2025     03/06/2025     03/05/2025       No results found for: \"CHOL\"  Lab Results   Component Value Date    HDL 36 (L) 02/12/2025     Lab Results   Component Value Date    LDLCALC 77 02/12/2025     Lab Results   Component Value Date    TRIG 164 (H) 02/12/2025     No results found for: \"LDLDIRECT\"    Lab Results   Component Value Date    ALT 17 03/02/2025    ALT 24 02/12/2025    AST 14 03/02/2025    AST 20 02/12/2025    ALKPHOS 85 03/02/2025    ALKPHOS 86 02/12/2025             No results found for: \"NTBNP\"    Lab Results   Component Value Date    HGBA1C 5.9 (H) 02/12/2025       Imaging: Reviewed in epic      Review of Systems:  14 systems reviewed and negative with exception of the above       PHYSICAL EXAM:        Vitals:    03/21/25 1347   BP: 134/66   Pulse: 94   SpO2: 99%     Body mass index is 27.26 kg/m².  Weight (last 2 days)       Date/Time Weight    03/21/25 1347 86.2 (190)               Gen: No acute distress  HEENT: anicteric, mucous membranes moist  Neck: supple, no jugular venous distention, or carotid bruit  Heart: regular, normal s1 and s2, no " murmur/rub or gallop  Lungs :clear to auscultation bilaterally, no rales/rhonchi or wheeze  Abdomen: soft nontender, normoactive bowel sounds, no organomegaly  Ext: warm and perfused, normal femoral pulses, no edema, or clubbing  Skin: warm, no rashes  Neuro: AAO x 3, no focal findings  Psychiatric: normal affect  Musculoskeletal: no obvious joint deformities.        This note was completed in part utilizing direct voice recognition software.   Grammatical errors, random word insertion, spelling mistakes, and incomplete sentences may be an occasional consequence of the system secondary to software limitations, ambient noise and hardware issues. At the time of dictation, efforts were made to edit, clarify and /or correct errors.  Please read the chart carefully and recognize, using context, where substitutions have occurred.  If you have any questions or concerns about the context, text or information contained within the body of this dictation, please contact myself, the provider, for further clarification.

## 2025-03-24 ENCOUNTER — PREP FOR PROCEDURE (OUTPATIENT)
Dept: UROLOGY | Facility: AMBULATORY SURGERY CENTER | Age: 65
End: 2025-03-24

## 2025-03-24 DIAGNOSIS — R39.89 SUSPECTED UTI: ICD-10-CM

## 2025-03-24 DIAGNOSIS — Z79.01 LONG TERM (CURRENT) USE OF ANTICOAGULANTS: ICD-10-CM

## 2025-03-24 DIAGNOSIS — Z01.812 PRE-OPERATIVE LABORATORY EXAMINATION: ICD-10-CM

## 2025-03-24 DIAGNOSIS — R33.8 BENIGN PROSTATIC HYPERPLASIA WITH URINARY RETENTION: Primary | ICD-10-CM

## 2025-03-24 DIAGNOSIS — Z01.818 PREOP EXAMINATION: ICD-10-CM

## 2025-03-24 DIAGNOSIS — N40.1 BENIGN PROSTATIC HYPERPLASIA WITH URINARY RETENTION: Primary | ICD-10-CM

## 2025-03-26 ENCOUNTER — CONSULT (OUTPATIENT)
Dept: ENDOCRINOLOGY | Facility: CLINIC | Age: 65
End: 2025-03-26
Payer: MEDICARE

## 2025-03-26 VITALS
WEIGHT: 191.2 LBS | TEMPERATURE: 98.6 F | RESPIRATION RATE: 18 BRPM | SYSTOLIC BLOOD PRESSURE: 150 MMHG | HEIGHT: 70 IN | HEART RATE: 101 BPM | OXYGEN SATURATION: 99 % | DIASTOLIC BLOOD PRESSURE: 78 MMHG | BODY MASS INDEX: 27.37 KG/M2

## 2025-03-26 DIAGNOSIS — I10 PRIMARY HYPERTENSION: ICD-10-CM

## 2025-03-26 DIAGNOSIS — R73.03 PREDIABETES: ICD-10-CM

## 2025-03-26 DIAGNOSIS — R79.89 HIGH SERUM RENIN: ICD-10-CM

## 2025-03-26 DIAGNOSIS — E27.8 LEFT ADRENAL MASS (HCC): Primary | ICD-10-CM

## 2025-03-26 PROCEDURE — 99204 OFFICE O/P NEW MOD 45 MIN: CPT | Performed by: STUDENT IN AN ORGANIZED HEALTH CARE EDUCATION/TRAINING PROGRAM

## 2025-03-26 NOTE — ASSESSMENT & PLAN NOTE
The patient's blood pressure was recorded at 150/78 today. He is currently taking losartan 100 mg and hydrochlorothiazide. He was advised to continue his current medication regimen and to monitor his blood pressure regularly. Lifestyle modifications discussed.

## 2025-03-26 NOTE — PROGRESS NOTES
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  Initial biochemical testing showed unremarkable result for pheochromocytoma, although given fat content of the nodule pheochromocytoma is less likely.  The patient's renin levels are elevated, likely due to ARB use which can increase renin levels by up to 3 to 4 times above normal.   The possibility of subclinical Cushing's syndrome was discussed, also known as mild autonomous cortisol secretion, for which I ordered ACTH and DHEA-S and to be followed by low-dose dexamethasone suppression test.  A repeat CT scan will be scheduled in 3 months to further evaluate the adrenal gland nodule.   Indications for surgical removal of myelo lipoma discussed, as bigger than 6 cm as well as compressive symptoms, pending above result, we will decide in regards to referral to surgical oncology team to consider left adrenalectomy.        Follow-up  The patient will follow up in 4 months.    Orders:  •  Ambulatory Referral to Endocrinology  •  Aldosterone  •  Renin Activity, Plasma  •  ACTH  •  DHEA-sulfate  •  CT abdomen pelvis w wo contrast; Future    High serum renin    Orders:  •  Ambulatory Referral to Endocrinology  •  Aldosterone  •  Renin Activity, Plasma    Primary hypertension  The patient's blood pressure was recorded at 150/78 today. He is currently taking losartan 100 mg and hydrochlorothiazide. He was advised to continue his current medication regimen and to monitor his blood pressure regularly.  Lifestyle modifications discussed.       Prediabetes  The patient has an A1c of 5.9%, indicating prediabetes. He has never been diagnosed with diabetes and is not on any medication for it. He was advised to maintain a balanced diet, avoiding high-carb foods and juices, and to incorporate regular exercise into his routine. A printout detailing dietary recommendations will be provided.           History of Present Illness   History of Present Illness          Trent Fonseca is a 64 y.o. male who is referred for evaluation of a left-sided adrenal mass and as well as high renin at the request of  Soila Myers PA-C.  he sought emergency care on 02/09/2025 due to urinary retention, necessitating catheterization. A subsequent CT scan of the pelvis revealed Calcifications of the left adrenal gland, possibly related to chronic inflammation or old trauma. There is a heterogeneous fat-containing mass in the left adrenal gland with some associated small calcifications differential considerations include myelolipoma, fat necrosis or dermoid this measures approximately 5.7 cm in size    Repeated CT which was done on 3/05/2025 for fever showed There is a 6.3 x 6.2 x 5.1 cm lesion in place of the left adrenal gland, likely arising from the medial limb, mostly comprised of bulk fat, with some small peripheral calcifications. The region of the lateral limb of the adrenal gland is heavily calcified.    Initial biochemical testing showed range of 21.279, with aldosterone of 19.2 and aldosterone/renin ratio of 0.9.  Normal metanephrine fractionated plasma and 24 hours urine metanephrines.    His blood pressure readings have been consistently high, with a reading of 150 today and 130 during his last office visit. He does not monitor his blood pressure at home but reports weekly readings ranging from 120 to 140. He reports no recent weight gain, noting a decrease from 192 to 185 pounds. He reports no history of diabetes or medication  "use for the same. He reports no presence of abdominal stretch marks, easy bruising, or fractures. He also reports no episodes of headaches, high blood pressure, palpitations, or tremors. His diet is devoid of sugar, fried foods, and bread, but includes fruit and orange juice. He does not engage in regular exercise. He reports no history of thyroid issues but does report low potassium levels, which he attributes to his medication. He is currently on losartan 100 mg and hydrochlorothiazide for blood pressure management.    FAMILY HISTORY  His daughter has diabetes and obesity due to a pituitary adenoma.      Pertinent Medical History            Review of Systems as per [facility]  Medical History Reviewed by provider this encounter:     .  Current Outpatient Medications on File Prior to Visit   Medication Sig Dispense Refill   • aspirin 81 mg chewable tablet Chew 81 mg daily     • atorvastatin (LIPITOR) 40 mg tablet Take 1 tablet (40 mg total) by mouth at bedtime 100 tablet 0   • hydroCHLOROthiazide 25 mg tablet Take 1 tablet (25 mg total) by mouth every morning 90 tablet 2   • losartan (COZAAR) 100 MG tablet Take 1 tablet (100 mg total) by mouth in the morning 90 tablet 2   • tamsulosin (FLOMAX) 0.4 mg TAKE 1 CAPSULE(0.4 MG) BY MOUTH DAILY WITH DINNER 90 capsule 0     No current facility-administered medications on file prior to visit.         Medical History Reviewed by provider this encounter:     .    Objective   /78 (BP Location: Right arm, Patient Position: Sitting, Cuff Size: Adult)   Pulse 101   Temp 98.6 °F (37 °C) (Temporal)   Resp 18   Ht 5' 10\" (1.778 m)   Wt 86.7 kg (191 lb 3.2 oz)   SpO2 99%   BMI 27.43 kg/m²      Body mass index is 27.43 kg/m².  Wt Readings from Last 3 Encounters:   03/26/25 86.7 kg (191 lb 3.2 oz)   03/21/25 86.2 kg (190 lb)   03/17/25 88 kg (194 lb)     Physical Exam  Vitals and nursing note reviewed.   Constitutional:       General: He is not in acute distress.     Appearance: " He is well-developed.   HENT:      Head: Normocephalic and atraumatic.   Eyes:      Conjunctiva/sclera: Conjunctivae normal.   Cardiovascular:      Rate and Rhythm: Normal rate and regular rhythm.      Heart sounds: No murmur heard.  Pulmonary:      Effort: Pulmonary effort is normal. No respiratory distress.      Breath sounds: Normal breath sounds.   Musculoskeletal:         General: No swelling.      Cervical back: Neck supple.   Skin:     General: Skin is warm and dry.   Neurological:      Mental Status: He is alert.   Psychiatric:         Mood and Affect: Mood normal.       Physical Exam  Heart sounds normal.    Vital Signs  Blood pressure is 150/78. Weight is 191.    Results  Laboratory Studies  A1c is 5.9%. Aldosterone renin ratio was checked, renin was elevated. Catecholamines test was negative.    Imaging  CAT scan of the pelvic area showed an enlarged prostate, calcification on some arteries, and a nodule on the adrenal gland. Repeated CAT scan showed the size of the nodule is 6.3 x 6.2 x 5.1 cm, located on the left adrenal, comprised of bulk of fat.  Labs:   Lab Results   Component Value Date    HGBA1C 5.9 (H) 02/12/2025     Lab Results   Component Value Date    CREATININE 0.81 03/06/2025    CREATININE 0.88 03/05/2025    CREATININE 0.92 03/04/2025    BUN 8 03/06/2025    K 3.3 (L) 03/06/2025     03/06/2025    CO2 27 03/06/2025     eGFR   Date Value Ref Range Status   03/06/2025 92 ml/min/1.73sq m Final     Lab Results   Component Value Date    HDL 36 (L) 02/12/2025    TRIG 164 (H) 02/12/2025     Lab Results   Component Value Date    ALT 17 03/02/2025    AST 14 03/02/2025    ALKPHOS 85 03/02/2025     CT ABDOMEN AND PELVIS WITH IV CONTRAST     INDICATION: Fever, UTI with indwelling catheter.     COMPARISON: CT of the abdomen and pelvis from 2/10/2025.     TECHNIQUE: CT examination of the abdomen and pelvis was performed. Multiplanar 2D reformatted images were created from the source data.     This  examination, like all CT scans performed in the [facility], was performed utilizing techniques to minimize radiation dose exposure, including the use of iterative reconstruction and automated exposure control. Radiation dose length   product (DLP) for this visit: 665 mGy-cm     IV Contrast: 100 mL of iohexol  Enteric Contrast: Enteric contrast was not administered.     FINDINGS:     LOWER CHEST: No clinically significant findings in the imaged lung bases. No pleural effusions.     LIVER/BILIARY TREE: Normal liver morphology. Small simple appearing cyst in the right hepatic lobe.     No biliary ductal dilation.     GALLBLADDER: There is cholelithiasis, however no gallbladder wall thickening or pericholecystic fluid to suggest acute cholecystitis.     SPLEEN: Unremarkable.     PANCREAS: Unremarkable. No dilatation of the main pancreatic duct.     ADRENAL GLANDS: Unremarkable right adrenal gland. No normal left adrenal gland parenchyma is seen. There is a 6.3 x 6.2 x 5.1 cm lesion in place of the left adrenal gland, likely arising from the medial limb, mostly comprised of bulk fat, with some small   peripheral calcifications. The region of the lateral limb of the adrenal gland is heavily calcified. These findings are unchanged from the February 2025 CT.     KIDNEYS/URETERS: Symmetric renal enhancement.  No intrarenal or ureteral calculi. No hydronephrosis. There are subcentimeter hypodense lesions in both kidneys, too small to accurately characterize with CT technique, however statistically likely cysts.     STOMACH AND BOWEL:  Evaluation of the gastrointestinal tract is somewhat limited by underdistention and lack of oral contrast. No bowel obstruction or convincing inflammation.     APPENDIX:  The appendix is not visualized, however there are no secondary findings of appendicitis.     ABDOMINOPELVIC CAVITY:  No ascites or pneumoperitoneum.     LYMPH NODES: No abdominal or pelvic lymphadenopathy.      VESSELS: Normal caliber aorta. Patent major branch vessels. Scattered atherosclerotic calcifications in the abdominal aorta and its major branches.     REPRODUCTIVE ORGANS: The prostate gland is significantly enlarged, measuring approximately 8.1 x 5.9 x 5.0 cm (volume of 125 mL). The seminal vesicles are also prominent.     URINARY BLADDER:  The urinary bladder is mostly collapsed around a Bella catheter, limiting evaluation. There is circumferential wall thickening of the urinary bladder at a proportion for degree of underdistention. There is a soft tissue density lesion   measuring at least 4.3 x 4.1 x 3.5 cm along the posterior wall of the urinary bladder, inseparable from the prostate gland.     ABDOMINAL WALL/INGUINAL REGIONS: No ventral abdominal wall hernia.  MUSCULOSKELETAL:  No focal aggressive osseous lesions. Several subcentimeter densely sclerotic lesions are seen in the right iliac bone, left acetabulum and both proximal femurs, statistically likely representing bone islands.     IMPRESSION:     1) Urinary bladder mostly collapsed around Bella catheter, limiting evaluation. 4.3 x 4.1 x 3.5 cm soft tissue density lesion along the posterior wall of the urinary bladder, inseparable from the prostate gland, as on the prior study. This may represent   significant intravesical extension of prostatomegaly, however an underlying urothelial mass is not entirely excluded. Urology consultation recommended if not already obtained.     2) No evidence of pyelonephritis.     3) No other acute abdominal or pelvic pathology.     4) Unchanged appearance of the left adrenal gland as in February 2025, with dense calcifications of the lateral limb, likely related to sequela of a remote insult and a 6.3 x 6.2 x 5.1 cm fat-containing lesion, as discussed above. This lesion likely   represents a myolipoma. Given large size recommend surgical consultation.     5) Additional findings as above.  No results found for:  "\"[id number]\"  No results found for: \"FREET4\", \"TSI\"    There are no Patient Instructions on file for this visit.    Discussed with the patient and all questioned fully answered. He will call me if any problems arise.    CT ABDOMEN AND PELVIS WITH IV CONTRAST     INDICATION: urinary retention acutely today, no known hx of prostate issues or kidney stones. .     COMPARISON: None.     TECHNIQUE: CT examination of the abdomen and pelvis was performed. Multiplanar 2D reformatted images were created from the source data.     This examination, like all CT scans performed in the [facility], was performed utilizing techniques to minimize radiation dose exposure, including the use of iterative reconstruction and automated exposure control. Radiation dose length   product (DLP) for this visit: 663 mGy-cm     IV Contrast: 100 mL of iohexol  Enteric Contrast: Not administered.     FINDINGS:     ABDOMEN     LOWER CHEST: Linear atelectasis in the inferior left lingula. Coronary atherosclerosis. Heart appears normal in size.     LIVER/BILIARY TREE: Subcentimeter hypoattenuating lesion(s), too small to characterize but statistically likely benign, which do not require follow-up (ACR White Paper 2017). No suspicious mass. Normal hepatic contours. No biliary dilation.     GALLBLADDER: Cholelithiasis without findings of acute cholecystitis.     SPLEEN: Unremarkable.     PANCREAS: Unremarkable.     ADRENAL GLANDS: Calcifications of the left adrenal gland, possibly related to chronic inflammation or old trauma. There is a heterogeneous fat-containing mass in the left adrenal gland with some associated small calcifications differential considerations   include myelolipoma, fat necrosis or dermoid this measures approximately 5.7 cm in size (axial image 54, series 2).     KIDNEYS/URETERS: Subcentimeter myolipoma in left kidney. No hydronephrosis or urinary tract calculi. Subcentimeter hypoattenuating renal lesion(s), too " small to characterize but statistically likely benign, which do not warrant follow-up (Radiology June 2019).     STOMACH AND BOWEL: Grossly unremarkable     APPENDIX: No findings to suggest appendicitis.     ABDOMINOPELVIC CAVITY: No ascites. No pneumoperitoneum. No lymphadenopathy.     VESSELS: Mild to moderate atherosclerosis, no aortic aneurysm.     PELVIS     REPRODUCTIVE ORGANS: Prostate is enlarged. Prostate volume estimated at 120 mL.     URINARY BLADDER: Prostate indents the bladder base. Bladder is essentially collapsed around a bladder catheter. Consider a component of chronic bladder outlet obstruction and/or cystitis depending on the clinical setting.     ABDOMINAL WALL/INGUINAL REGIONS: Unremarkable.     BONES: No acute fracture or suspicious osseous lesion.        IMPRESSION:     Enlarged prostate. Prostate indents the bladder base. Bladder is essentially collapsed around a bladder catheter. Consider a component of chronic bladder outlet obstruction and/or cystitis depending on the clinical setting. Correlation with the patient's   symptoms, laboratory values, and urinalysis recommended.     Calcifications of the left adrenal gland, possibly related to chronic inflammation or old trauma. There is a heterogeneous fat-containing mass in the left adrenal gland with some associated small peripheral calcifications; differential considerations   include myelolipoma, fat necrosis or dermoid; this measures approximately 5.7 cm in size (axial image 54, series 2).     Coronary atherosclerosis, cholelithiasis without discrete CT evidence of acute cholecystitis, and other findings as above.     Findings are consistent with the preliminary report from [facility] which was provided shortly after completion of the exam.

## 2025-03-26 NOTE — ASSESSMENT & PLAN NOTE
The patient has an A1c of 5.9%, indicating prediabetes. He has never been diagnosed with diabetes and is not on any medication for it. He was advised to maintain a balanced diet, avoiding high-carb foods and juices, and to incorporate regular exercise into his routine. A printout detailing dietary recommendations will be provided.      Yes - and I had signed a fax to that effect earlier today.  Please call in addition.  Thank you.

## 2025-03-28 ENCOUNTER — TELEPHONE (OUTPATIENT)
Age: 65
End: 2025-03-28

## 2025-03-28 NOTE — TELEPHONE ENCOUNTER
Left a bag in the front with PT name and  in front with Cath bag and Cath supply companies PT can go to for more cath supplies.

## 2025-03-28 NOTE — TELEPHONE ENCOUNTER
Patient requesting to  a leg bag and a overnight bag.  If we have it prefers the leg bag that is 1000 ml  Is going tot try to get it today but if not will be there on Monday. He knows to arrive before 4 pm

## 2025-04-04 PROBLEM — T83.511A URINARY TRACT INFECTION ASSOCIATED WITH INDWELLING URETHRAL CATHETER  (HCC): Status: RESOLVED | Noted: 2025-03-02 | Resolved: 2025-04-04

## 2025-04-04 PROBLEM — N39.0 URINARY TRACT INFECTION ASSOCIATED WITH INDWELLING URETHRAL CATHETER  (HCC): Status: RESOLVED | Noted: 2025-03-02 | Resolved: 2025-04-04

## 2025-04-11 ENCOUNTER — HOSPITAL ENCOUNTER (OUTPATIENT)
Dept: NON INVASIVE DIAGNOSTICS | Facility: CLINIC | Age: 65
Discharge: HOME | End: 2025-04-11
Payer: MEDICARE

## 2025-04-11 VITALS
HEART RATE: 86 BPM | DIASTOLIC BLOOD PRESSURE: 74 MMHG | WEIGHT: 190 LBS | BODY MASS INDEX: 27.2 KG/M2 | SYSTOLIC BLOOD PRESSURE: 138 MMHG | OXYGEN SATURATION: 96 % | HEIGHT: 70 IN

## 2025-04-11 VITALS
SYSTOLIC BLOOD PRESSURE: 150 MMHG | DIASTOLIC BLOOD PRESSURE: 78 MMHG | HEART RATE: 94 BPM | HEIGHT: 70 IN | BODY MASS INDEX: 27.35 KG/M2 | WEIGHT: 191 LBS

## 2025-04-11 DIAGNOSIS — E78.5 HYPERLIPIDEMIA, UNSPECIFIED HYPERLIPIDEMIA TYPE: ICD-10-CM

## 2025-04-11 DIAGNOSIS — I10 PRIMARY HYPERTENSION: ICD-10-CM

## 2025-04-11 DIAGNOSIS — R93.1 HIGH CORONARY ARTERY CALCIUM SCORE: ICD-10-CM

## 2025-04-11 DIAGNOSIS — R94.31 ABNORMAL ECG: ICD-10-CM

## 2025-04-11 DIAGNOSIS — R94.39 POSITIVE CARDIAC STRESS TEST: ICD-10-CM

## 2025-04-11 LAB
AORTIC ROOT: 3.5 CM
ASCENDING AORTA: 3 CM
BSA FOR ECHO PROCEDURE: 2.05 M2
CHEST PAIN STATEMENT: NORMAL
CHEST PAIN STATEMENT: NORMAL
E WAVE DECELERATION TIME: 241 MS
E/A RATIO: 0.96
FRACTIONAL SHORTENING: 38 (ref 28–44)
INTERVENTRICULAR SEPTUM IN DIASTOLE (PARASTERNAL SHORT AXIS VIEW): 0.9 CM
INTERVENTRICULAR SEPTUM: 0.9 CM (ref 0.6–1.1)
LAAS-AP2: 14 CM2
LAAS-AP4: 11.4 CM2
LEFT ATRIUM SIZE: 2.8 CM
LEFT ATRIUM VOLUME (MOD BIPLANE): 29 ML
LEFT ATRIUM VOLUME INDEX (MOD BIPLANE): 14.1 ML/M2
LEFT INTERNAL DIMENSION IN SYSTOLE: 2.5 CM (ref 2.1–4)
LEFT VENTRICULAR INTERNAL DIMENSION IN DIASTOLE: 4 CM (ref 3.5–6)
LEFT VENTRICULAR POSTERIOR WALL IN END DIASTOLE: 1.1 CM
LEFT VENTRICULAR STROKE VOLUME: 47 ML
LV EF US.2D.A4C+ESTIMATED: 67 %
LVSV (TEICH): 47 ML
MAX DIASTOLIC BP: 60 MMHG
MAX DIASTOLIC BP: 60 MMHG
MAX HR PERCENT: 91 %
MAX HR: 141 BPM
MAX PREDICTED HEART RATE: 154 BPM
MAX PREDICTED HEART RATE: 154 BPM
MV E'TISSUE VEL-LAT: 7 CM/S
MV E'TISSUE VEL-SEP: 8 CM/S
MV PEAK A VEL: 0.8 M/S
MV PEAK E VEL: 77 CM/S
MV STENOSIS PRESSURE HALF TIME: 70 MS
MV VALVE AREA P 1/2 METHOD: 3.14
PROTOCOL NAME: NORMAL
PROTOCOL NAME: NORMAL
RATE PRESSURE PRODUCT: NORMAL
RIGHT ATRIUM AREA SYSTOLE A4C: 11.5 CM2
RIGHT VENTRICLE ID DIMENSION: 3.4 CM
SL CV LEFT ATRIUM LENGTH A2C: 4.8 CM
SL CV PED ECHO LEFT VENTRICLE DIASTOLIC VOLUME (MOD BIPLANE) 2D: 68 ML
SL CV PED ECHO LEFT VENTRICLE SYSTOLIC VOLUME (MOD BIPLANE) 2D: 21 ML
SL CV REST NUCLEAR ISOTOPE DOSE: 10.96 MCI
SL CV STRESS NUCLEAR ISOTOPE DOSE: 31.1 MCI
SL CV STRESS RECOVERY BP: NORMAL MMHG
SL CV STRESS RECOVERY HR: 101 BPM
SL CV STRESS RECOVERY O2 SAT: 98 %
SL CV STRESS STAGE REACHED: 3
SPECT HRT GATED+EF W RNC IV: 76 %
STRESS ANGINA INDEX: 0
STRESS BASELINE BP: NORMAL MMHG
STRESS BASELINE HR: 86 BPM
STRESS DUKE TREADMILL SCORE: 2
STRESS O2 SAT REST: 96 %
STRESS PEAK HR: 141 BPM
STRESS POST ESTIMATED WORKLOAD: 10.1 METS
STRESS POST EXERCISE DUR MIN: 7 MIN
STRESS POST EXERCISE DUR SEC: 7 SEC
STRESS POST O2 SAT PEAK: 99 %
STRESS POST PEAK BP: 180 MMHG
STRESS POST PEAK HR: 141 BPM
STRESS POST PEAK HR: 141 BPM
STRESS POST PEAK SYSTOLIC BP: 180 MMHG
STRESS POST PEAK SYSTOLIC BP: 180 MMHG
STRESS ST DEPRESSION: 1 MM
STRESS/REST PERFUSION RATIO: 0.93
TARGET HR FORMULA: NORMAL
TARGET HR FORMULA: NORMAL
TEST INDICATION: NORMAL
TEST INDICATION: NORMAL
TRICUSPID ANNULAR PLANE SYSTOLIC EXCURSION: 2 CM

## 2025-04-11 PROCEDURE — 93017 CV STRESS TEST TRACING ONLY: CPT

## 2025-04-11 PROCEDURE — A9502 TC99M TETROFOSMIN: HCPCS

## 2025-04-11 PROCEDURE — 93016 CV STRESS TEST SUPVJ ONLY: CPT | Performed by: INTERNAL MEDICINE

## 2025-04-11 PROCEDURE — 78452 HT MUSCLE IMAGE SPECT MULT: CPT

## 2025-04-11 PROCEDURE — 93306 TTE W/DOPPLER COMPLETE: CPT | Performed by: INTERNAL MEDICINE

## 2025-04-11 PROCEDURE — 78452 HT MUSCLE IMAGE SPECT MULT: CPT | Performed by: INTERNAL MEDICINE

## 2025-04-11 PROCEDURE — 93306 TTE W/DOPPLER COMPLETE: CPT

## 2025-04-11 PROCEDURE — 93018 CV STRESS TEST I&R ONLY: CPT | Performed by: INTERNAL MEDICINE

## 2025-04-15 ENCOUNTER — RESULTS FOLLOW-UP (OUTPATIENT)
Age: 65
End: 2025-04-15

## 2025-04-17 ENCOUNTER — TELEPHONE (OUTPATIENT)
Age: 65
End: 2025-04-17

## 2025-04-17 NOTE — TELEPHONE ENCOUNTER
I called and spoke with Enrique, went over instructions per Mare Medina, Enrique stated understanding.

## 2025-04-17 NOTE — TELEPHONE ENCOUNTER
Patient's wife called to verify information regarding his labs.    She would like to know when exactly he needs to have them done. Instructions for any medications he needs to take or hold. She would also like to make sure she knows all the test he is to have done.     She would like a call back.

## 2025-04-17 NOTE — TELEPHONE ENCOUNTER
Covering for Dr. Chaney.     I have reviewed the patient's case as well as Dr. Chaney's notes.    He ordered four labs to be completed at this time:     DHEA-sulfate and ACTH which need to be completed fasting between the hours 7-9am.    At the same time, he can complete the Renin and aldosterone levels.     Given patient's elevated BP, it was not recommended that he hold any of his medications at this time. That said, as the labs are fasting, he should wait to take his morning medications until after completing this blood work.     Labs need to be completed prior to follow up visit with Dr. Chaney on 8/18/2025.    Thank you.

## 2025-04-21 NOTE — TELEPHONE ENCOUNTER
----- Message from Zeina YOO sent at 4/15/2025 10:15 AM EDT -----    ----- Message -----  From: Raheem Camargo DO  Sent: 4/15/2025   9:58 AM EDT  To: [facility]    Please let patient know stress test images were normal. Thanks, Konstantin    Spoke with pt re: normal ST rslts.

## 2025-04-22 ENCOUNTER — PROCEDURE VISIT (OUTPATIENT)
Dept: UROLOGY | Facility: CLINIC | Age: 65
End: 2025-04-22
Payer: MEDICARE

## 2025-04-22 VITALS
DIASTOLIC BLOOD PRESSURE: 78 MMHG | OXYGEN SATURATION: 98 % | WEIGHT: 191 LBS | SYSTOLIC BLOOD PRESSURE: 150 MMHG | HEART RATE: 93 BPM | BODY MASS INDEX: 27.35 KG/M2 | HEIGHT: 70 IN

## 2025-04-22 DIAGNOSIS — N40.1 BENIGN PROSTATIC HYPERPLASIA WITH URINARY RETENTION: Primary | ICD-10-CM

## 2025-04-22 DIAGNOSIS — R33.8 BENIGN PROSTATIC HYPERPLASIA WITH URINARY RETENTION: Primary | ICD-10-CM

## 2025-04-22 PROCEDURE — 51702 INSERT TEMP BLADDER CATH: CPT

## 2025-04-22 NOTE — PROGRESS NOTES
"4/22/2025  Trent Fonseca is a 64 y.o. male  [id number]    Diagnosis:  Chief Complaint    Urinary Retention         Patient presents for routine rich exchange managed by Dr. Larson    Plan:  Patient will follow up as scheduled for pre op appt and next rich exchange   Patient instructed to call with any questions or concerns in the meantime.    No orders of the defined types were placed in this encounter.       Vitals:    04/22/25 1013   BP: 150/78   Pulse: 93   SpO2: 98%   Weight: 86.6 kg (191 lb)   Height: 5' 10\" (1.778 m)           Procedure:    Universal Protocol:  procedure performed by consultantConsent: Verbal consent obtained.  Risks and benefits: risks, benefits and alternatives were discussed  Consent given by: patient  Patient understanding: patient states understanding of the procedure being performed  Patient consent: the patient's understanding of the procedure matches consent given  Patient identity confirmed: verbally with patient    Bladder catheterization    Date/Time: 4/22/2025 10:30 AM    Performed by: Nidia Dangelo RN  Authorized by: Joselito Larson MD    Patient location:  Bedside  Consent:     Consent given by:  Patient  Universal protocol:     Procedure explained and questions answered to patient or proxy's satisfaction: yes      Patient identity confirmed:  Verbally with patient  Anesthesia (see MAR for exact dosages):     Anesthesia method:  None  Procedure details:     Bladder irrigation: no      Catheter insertion:  Indwelling    Approach: natural orifice      Catheter type:  Coude, latex and Rich    Catheter size:  18 Fr    Number of attempts:  1    Successful placement: yes      Urine characteristics:  Yellow  Post-procedure details:     Patient tolerance of procedure:  Tolerated well, no immediate complications  Comments:      10 ml sterile water removed from previous balloon. Previous catheter removed without issue. Prepped with betadine. New rich inserted without " issue. 10 ml inserted into balloon. Attached to leg bag and stat lock . Extra bags supplied            Nidia Dangelo RN

## 2025-04-27 DIAGNOSIS — R33.8 BENIGN PROSTATIC HYPERPLASIA WITH URINARY RETENTION: ICD-10-CM

## 2025-04-27 DIAGNOSIS — N40.1 BENIGN PROSTATIC HYPERPLASIA WITH URINARY RETENTION: ICD-10-CM

## 2025-04-28 RX ORDER — TAMSULOSIN HYDROCHLORIDE 0.4 MG/1
0.4 CAPSULE ORAL
Qty: 30 CAPSULE | Refills: 5 | Status: SHIPPED | OUTPATIENT
Start: 2025-04-28

## 2025-05-01 ENCOUNTER — APPOINTMENT (OUTPATIENT)
Age: 65
End: 2025-05-01
Attending: STUDENT IN AN ORGANIZED HEALTH CARE EDUCATION/TRAINING PROGRAM
Payer: MEDICARE

## 2025-05-01 DIAGNOSIS — R97.20 ELEVATED PSA: ICD-10-CM

## 2025-05-01 LAB — PSA SERPL-MCNC: 7.63 NG/ML (ref 0–4)

## 2025-05-01 PROCEDURE — 84153 ASSAY OF PSA TOTAL: CPT

## 2025-05-02 LAB
ACTH PLAS-MCNC: 23.6 PG/ML (ref 7.2–63.3)
DHEA-S SERPL-MCNC: 76 UG/DL (ref 30.9–295.6)

## 2025-05-04 ENCOUNTER — RESULTS FOLLOW-UP (OUTPATIENT)
Dept: ENDOCRINOLOGY | Facility: CLINIC | Age: 65
End: 2025-05-04

## 2025-05-05 LAB
ALDOST SERPL-MCNC: 8.5 NG/DL (ref 0–30)
RENIN PLAS-CCNC: 2.14 NG/ML/HR (ref 0.17–5.38)

## 2025-05-06 DIAGNOSIS — E27.8 ADRENAL MASS GREATER THAN 4 CM IN DIAMETER (HCC): Primary | ICD-10-CM

## 2025-05-06 RX ORDER — DEXAMETHASONE 1 MG
1 TABLET ORAL ONCE
Qty: 1 TABLET | Refills: 0 | Status: SHIPPED | OUTPATIENT
Start: 2025-05-06 | End: 2025-05-06

## 2025-05-08 ENCOUNTER — APPOINTMENT (OUTPATIENT)
Age: 65
End: 2025-05-08
Attending: STUDENT IN AN ORGANIZED HEALTH CARE EDUCATION/TRAINING PROGRAM
Payer: MEDICARE

## 2025-05-08 DIAGNOSIS — E27.8 ADRENAL MASS GREATER THAN 4 CM IN DIAMETER (HCC): ICD-10-CM

## 2025-05-08 LAB — CORTIS AM PEAK SERPL-MCNC: 0.8 UG/DL (ref 6.7–22.6)

## 2025-05-08 PROCEDURE — 82533 TOTAL CORTISOL: CPT

## 2025-05-08 PROCEDURE — 80299 QUANTITATIVE ASSAY DRUG: CPT

## 2025-05-08 PROCEDURE — 36415 COLL VENOUS BLD VENIPUNCTURE: CPT

## 2025-05-09 ENCOUNTER — RESULTS FOLLOW-UP (OUTPATIENT)
Dept: ENDOCRINOLOGY | Facility: CLINIC | Age: 65
End: 2025-05-09

## 2025-05-16 ENCOUNTER — PRE-ADMISSION TESTING (OUTPATIENT)
Dept: PREADMISSION TESTING | Facility: HOSPITAL | Age: 65
End: 2025-05-16
Payer: MEDICARE

## 2025-05-16 LAB — DEXAMETHASONE SERPL-MCNC: 354 NG/DL

## 2025-05-16 NOTE — PRE-PROCEDURE INSTRUCTIONS
Pre-Surgery Instructions:   Medication Instructions    aspirin 81 mg chewable tablet Stop taking 7 days prior to surgery.    atorvastatin (LIPITOR) 40 mg tablet Take night before surgery    hydroCHLOROthiazide 25 mg tablet Hold day of surgery.    losartan (COZAAR) 100 MG tablet Hold day of surgery.    tamsulosin (FLOMAX) 0.4 mg Hold day of surgery.    Medication instructions for day of surgery reviewed. Please take all instructed medications with only a sip of water.       You will receive a call one business day prior to surgery with an arrival time and hospital directions. If your surgery is scheduled on a Monday, the hospital will be calling you on the Friday prior to your surgery. If you have not heard from anyone by 8pm, please call the hospital supervisor through the hospital  at [phone]. ([place] [phone] or [place] [phone]).    Do not eat or drink anything after midnight the night before your surgery, including candy, mints, lifesavers, or chewing gum. Do not drink alcohol 24hrs before your surgery. Try not to smoke at least 24hrs before your surgery.       Follow the pre surgery showering instructions as listed in the “My Surgical Experience Booklet” or otherwise provided by your surgeon's office. Do not use a blade to shave the surgical area 1 week before surgery. It is okay to use a clean electric clippers up to 24 hours before surgery. Do not apply any lotions, creams, including makeup, cologne, deodorant, or perfumes after showering on the day of your surgery. Do not use dry shampoo, hair spray, hair gel, or any type of hair products.     No contact lenses, eye make-up, or artificial eyelashes. Remove nail polish, including gel polish, and any artificial, gel, or acrylic nails if possible. Remove all jewelry including rings and body piercing jewelry.     Wear causal clothing that is easy to take on and off. Consider your type of surgery.    Keep any valuables, jewelry, piercings  at home. Please bring any specially ordered equipment (sling, braces) if indicated.    Arrange for a responsible person to drive you to and from the hospital on the day of your surgery. Please confirm the visitor policy for the day of your procedure when you receive your phone call with an arrival time.     Call the surgeon's office with any new illnesses, exposures, or additional questions prior to surgery.    Please reference your “My Surgical Experience Booklet” for additional information to prepare for your upcoming surgery.

## 2025-05-21 ENCOUNTER — TELEPHONE (OUTPATIENT)
Age: 65
End: 2025-05-21

## 2025-05-21 ENCOUNTER — PROCEDURE VISIT (OUTPATIENT)
Dept: UROLOGY | Facility: CLINIC | Age: 65
End: 2025-05-21
Payer: MEDICARE

## 2025-05-21 ENCOUNTER — HOSPITAL ENCOUNTER (EMERGENCY)
Facility: HOSPITAL | Age: 65
Discharge: HOME | End: 2025-05-21
Attending: EMERGENCY MEDICINE
Payer: MEDICARE

## 2025-05-21 VITALS
RESPIRATION RATE: 18 BRPM | OXYGEN SATURATION: 96 % | SYSTOLIC BLOOD PRESSURE: 154 MMHG | HEART RATE: 100 BPM | TEMPERATURE: 98 F | DIASTOLIC BLOOD PRESSURE: 75 MMHG

## 2025-05-21 VITALS
OXYGEN SATURATION: 97 % | SYSTOLIC BLOOD PRESSURE: 146 MMHG | DIASTOLIC BLOOD PRESSURE: 80 MMHG | HEIGHT: 70 IN | BODY MASS INDEX: 27.41 KG/M2 | HEART RATE: 110 BPM

## 2025-05-21 DIAGNOSIS — R10.9 FLANK PAIN: Primary | ICD-10-CM

## 2025-05-21 DIAGNOSIS — R39.9 UTI SYMPTOMS: Primary | ICD-10-CM

## 2025-05-21 DIAGNOSIS — N40.1 BENIGN PROSTATIC HYPERPLASIA WITH URINARY RETENTION: ICD-10-CM

## 2025-05-21 DIAGNOSIS — R33.8 BENIGN PROSTATIC HYPERPLASIA WITH URINARY RETENTION: ICD-10-CM

## 2025-05-21 LAB
ALBUMIN SERPL BCG-MCNC: 4.6 G/DL (ref 3.5–5)
ALP SERPL-CCNC: 89 U/L (ref 34–104)
ALT SERPL W P-5'-P-CCNC: 17 U/L (ref 7–52)
ANION GAP SERPL CALCULATED.3IONS-SCNC: 9 MMOL/L (ref 4–13)
AST SERPL W P-5'-P-CCNC: 17 U/L (ref 13–39)
BACTERIA UR QL AUTO: ABNORMAL /HPF
BASOPHILS # BLD AUTO: 0.09 THOUSANDS/ÂΜL (ref 0–0.1)
BASOPHILS NFR BLD AUTO: 1 % (ref 0–1)
BILIRUB SERPL-MCNC: 1.29 MG/DL (ref 0.2–1)
BILIRUB UR QL STRIP: NEGATIVE
BUN SERPL-MCNC: 16 MG/DL (ref 5–25)
CALCIUM SERPL-MCNC: 9.5 MG/DL (ref 8.4–10.2)
CHLORIDE SERPL-SCNC: 100 MMOL/L (ref 96–108)
CLARITY UR: CLEAR
CO2 SERPL-SCNC: 26 MMOL/L (ref 21–32)
COLOR UR: COLORLESS
CREAT SERPL-MCNC: 0.91 MG/DL (ref 0.6–1.3)
EOSINOPHIL # BLD AUTO: 0.25 THOUSAND/ÂΜL (ref 0–0.61)
EOSINOPHIL NFR BLD AUTO: 3 % (ref 0–6)
ERYTHROCYTE [DISTWIDTH] IN BLOOD BY AUTOMATED COUNT: 13 % (ref 11.6–15.1)
GFR SERPL CREATININE-BSD FRML MDRD: 87 ML/MIN/1.73SQ M
GLUCOSE SERPL-MCNC: 127 MG/DL (ref 65–140)
GLUCOSE UR STRIP-MCNC: NEGATIVE MG/DL
HCT VFR BLD AUTO: 47 % (ref 36.5–49.3)
HGB BLD-MCNC: 15.7 G/DL (ref 12–17)
HGB UR QL STRIP.AUTO: ABNORMAL
IMM GRANULOCYTES # BLD AUTO: 0.02 THOUSAND/UL (ref 0–0.2)
IMM GRANULOCYTES NFR BLD AUTO: 0 % (ref 0–2)
KETONES UR STRIP-MCNC: NEGATIVE MG/DL
LEUKOCYTE ESTERASE UR QL STRIP: NEGATIVE
LYMPHOCYTES # BLD AUTO: 1.39 THOUSANDS/ÂΜL (ref 0.6–4.47)
LYMPHOCYTES NFR BLD AUTO: 14 % (ref 14–44)
MCH RBC QN AUTO: 29.7 PG (ref 26.8–34.3)
MCHC RBC AUTO-ENTMCNC: 33.4 G/DL (ref 31.4–37.4)
MCV RBC AUTO: 89 FL (ref 82–98)
MONOCYTES # BLD AUTO: 0.74 THOUSAND/ÂΜL (ref 0.17–1.22)
MONOCYTES NFR BLD AUTO: 8 % (ref 4–12)
NEUTROPHILS # BLD AUTO: 7.27 THOUSANDS/ÂΜL (ref 1.85–7.62)
NEUTS SEG NFR BLD AUTO: 74 % (ref 43–75)
NITRITE UR QL STRIP: NEGATIVE
NON-SQ EPI CELLS URNS QL MICRO: ABNORMAL /HPF
NRBC BLD AUTO-RTO: 0 /100 WBCS
PH UR STRIP.AUTO: 6.5 [PH]
PLATELET # BLD AUTO: 273 THOUSANDS/UL (ref 149–390)
PMV BLD AUTO: 9.9 FL (ref 8.9–12.7)
POTASSIUM SERPL-SCNC: 4 MMOL/L (ref 3.5–5.3)
PROT SERPL-MCNC: 7.5 G/DL (ref 6.4–8.4)
PROT UR STRIP-MCNC: NEGATIVE MG/DL
RBC # BLD AUTO: 5.28 MILLION/UL (ref 3.88–5.62)
RBC #/AREA URNS AUTO: ABNORMAL /HPF
SODIUM SERPL-SCNC: 135 MMOL/L (ref 135–147)
SP GR UR STRIP.AUTO: 1 (ref 1–1.03)
UROBILINOGEN UR STRIP-ACNC: <2 MG/DL
WBC # BLD AUTO: 9.76 THOUSAND/UL (ref 4.31–10.16)
WBC #/AREA URNS AUTO: ABNORMAL /HPF

## 2025-05-21 PROCEDURE — 87077 CULTURE AEROBIC IDENTIFY: CPT

## 2025-05-21 PROCEDURE — 99284 EMERGENCY DEPT VISIT MOD MDM: CPT

## 2025-05-21 PROCEDURE — 81001 URINALYSIS AUTO W/SCOPE: CPT

## 2025-05-21 PROCEDURE — 99284 EMERGENCY DEPT VISIT MOD MDM: CPT | Performed by: EMERGENCY MEDICINE

## 2025-05-21 PROCEDURE — 87086 URINE CULTURE/COLONY COUNT: CPT

## 2025-05-21 PROCEDURE — 85025 COMPLETE CBC W/AUTO DIFF WBC: CPT

## 2025-05-21 PROCEDURE — 36415 COLL VENOUS BLD VENIPUNCTURE: CPT

## 2025-05-21 PROCEDURE — 51702 INSERT TEMP BLADDER CATH: CPT

## 2025-05-21 PROCEDURE — 80053 COMPREHEN METABOLIC PANEL: CPT

## 2025-05-21 PROCEDURE — 87186 SC STD MICRODIL/AGAR DIL: CPT

## 2025-05-21 NOTE — ED PROVIDER NOTES
Time reflects when diagnosis was documented in both MDM as applicable and the Disposition within this note       Time User Action Codes Description Comment    5/21/2025  9:53 AM Nathalie Watts Add [R10.9] Flank pain           ED Disposition       ED Disposition   Discharge    Condition   Stable    Date/Time   Wed May 21, 2025  9:53 AM    Comment   Trent Raymundo discharge to home/self care.                   Assessment & Plan       Medical Decision Making  Enrique is 64-year-old male with a past medical history of BPH, with a chronic Bella present to the emergency department due to concern for UTI.    DDx includes but is not limited to: UTI, pyelonephritis, nephrolithiasis     Patient decided that he would rather have his catheter changed out by urology, and will have them work up the potential UTI.  Advised patient again, that we are perfectly willing to do this in the emergency department, but he reports to be more comfortable with urology.  Discussed return precautions, patient reports understanding and is comfortable discharge.            ED Course as of 05/21/25 1033   Wed May 21, 2025   0940 Discussion had with patient in regards to obtaining a UA.  Explained that we would have to replace the Bella catheter as this catheter has been in for roughly a month.  Patient and his wife are discussing now if they will like to proceed with this.       Medications - No data to display    ED Risk Strat Scores                    No data recorded                            History of Present Illness       Chief Complaint   Patient presents with    Flank Pain     Pt c/o L lower flank pain sudden onset 1230 this am. States pain improved after having a BM.       Past Medical History:   Diagnosis Date    Hyperlipidemia     Hypertension       Past Surgical History:   Procedure Laterality Date    APPENDECTOMY  2012    COLONOSCOPY      2011,2018,2024      Family History   Problem Relation Age of Onset    Heart disease Father      Heart disease Paternal Grandmother       Social History[1]   E-Cigarette/Vaping    E-Cigarette Use Never User       E-Cigarette/Vaping Substances    Nicotine No     THC No     CBD No     Flavoring No     Other No     Unknown No       I have reviewed and agree with the history as documented.     Enrique is 64-year-old male with a past medical history of BPH, with a chronic Bella present to the emergency department due to concern for UTI.  Patient reports that for the past several days he is noticed that his urine has been cloudy, and beginning at 1230 last night he experienced some left-sided flank pain.  He reports that at its worst the pain was 3 out of 10.    He denies any penile discharge, penile pain, hematuria, fevers, chills, or abdominal pain.        Review of Systems   Constitutional:         As per HPI.   All other systems reviewed and are negative.          Objective       ED Triage Vitals [05/21/25 0830]   Temperature Pulse Blood Pressure Respirations SpO2 Patient Position - Orthostatic VS   98 °F (36.7 °C) 100 154/75 18 96 % Sitting      Temp Source Heart Rate Source BP Location FiO2 (%) Pain Score    Oral Monitor Right arm -- --      Vitals      Date and Time Temp Pulse SpO2 Resp BP Pain Score FACES Pain Rating User   05/21/25 0830 98 °F (36.7 °C) 100 96 % 18 154/75 -- -- DB            Physical Exam  Vitals and nursing note reviewed.   Constitutional:       Appearance: Normal appearance.   HENT:      Head: Normocephalic and atraumatic.      Right Ear: External ear normal.      Left Ear: External ear normal.      Nose: Nose normal.      Mouth/Throat:      Mouth: Mucous membranes are moist.      Pharynx: Oropharynx is clear.     Eyes:      Extraocular Movements: Extraocular movements intact.      Conjunctiva/sclera: Conjunctivae normal.       Cardiovascular:      Rate and Rhythm: Normal rate.   Pulmonary:      Effort: Pulmonary effort is normal.   Abdominal:      General: There is no distension.       Palpations: Abdomen is soft.      Tenderness: There is no abdominal tenderness.     Musculoskeletal:         General: Normal range of motion.      Cervical back: Normal range of motion and neck supple.     Skin:     General: Skin is warm and dry.     Neurological:      General: No focal deficit present.      Mental Status: He is alert and oriented to person, place, and time.     Psychiatric:         Mood and Affect: Mood normal.         Behavior: Behavior normal.         Results Reviewed       Procedure Component Value Units Date/Time    Comprehensive metabolic panel [[id number]]  (Abnormal) Collected: 05/21/25 0835    Lab Status: Final result Specimen: Blood from Arm, Right Updated: 05/21/25 0859     Sodium 135 mmol/L      Potassium 4.0 mmol/L      Chloride 100 mmol/L      CO2 26 mmol/L      ANION GAP 9 mmol/L      BUN 16 mg/dL      Creatinine 0.91 mg/dL      Glucose 127 mg/dL      Calcium 9.5 mg/dL      AST 17 U/L      ALT 17 U/L      Alkaline Phosphatase 89 U/L      Total Protein 7.5 g/dL      Albumin 4.6 g/dL      Total Bilirubin 1.29 mg/dL      eGFR 87 ml/min/1.73sq m     Narrative:      National Kid[employer] guidelines for Chronic Kidney Disease (CKD):     Stage 1 with normal or high GFR (GFR > 90 mL/min/1.73 square meters)    Stage 2 Mild CKD (GFR = 60-89 mL/min/1.73 square meters)    Stage 3A Moderate CKD (GFR = 45-59 mL/min/1.73 square meters)    Stage 3B Moderate CKD (GFR = 30-44 mL/min/1.73 square meters)    Stage 4 Severe CKD (GFR = 15-29 mL/min/1.73 square meters)    Stage 5 End Stage CKD (GFR <15 mL/min/1.73 square meters)  Note: GFR calculation is accurate only with a steady state creatinine    CBC and differential [[id number]] Collected: 05/21/25 0835    Lab Status: Final result Specimen: Blood from Arm, Right Updated: 05/21/25 0843     WBC 9.76 Thousand/uL      RBC 5.28 Million/uL      Hemoglobin 15.7 g/dL      Hematocrit 47.0 %      MCV 89 fL      MCH 29.7 pg      MCHC 33.4 g/dL      RDW  13.0 %      MPV 9.9 fL      Platelets 273 Thousands/uL      nRBC 0 /100 WBCs      Segmented % 74 %      Immature Grans % 0 %      Lymphocytes % 14 %      Monocytes % 8 %      Eosinophils Relative 3 %      Basophils Relative 1 %      Absolute Neutrophils 7.27 Thousands/µL      Absolute Immature Grans 0.02 Thousand/uL      Absolute Lymphocytes 1.39 Thousands/µL      Absolute Monocytes 0.74 Thousand/µL      Eosinophils Absolute 0.25 Thousand/µL      Basophils Absolute 0.09 Thousands/µL             No orders to display       Procedures    ED Medication and Procedure Management   Prior to Admission Medications   Prescriptions Last Dose Informant Patient Reported? Taking?   aspirin 81 mg chewable tablet  Self, Spouse/Significant Other Yes No   Sig: Chew 81 mg in the morning.   atorvastatin (LIPITOR) 40 mg tablet  Self, Spouse/Significant Other No No   Sig: Take 1 tablet (40 mg total) by mouth at bedtime   hydroCHLOROthiazide 25 mg tablet  Self, Spouse/Significant Other No No   Sig: Take 1 tablet (25 mg total) by mouth every morning   losartan (COZAAR) 100 MG tablet  Self, Spouse/Significant Other No No   Sig: Take 1 tablet (100 mg total) by mouth in the morning   tamsulosin (FLOMAX) 0.4 mg   No No   Sig: TAKE 1 CAPSULE(0.4 MG) BY MOUTH DAILY WITH DINNER      Facility-Administered Medications: None     Discharge Medication List as of 5/21/2025  9:57 AM        CONTINUE these medications which have NOT CHANGED    Details   aspirin 81 mg chewable tablet Chew 81 mg in the morning., Historical Med      atorvastatin (LIPITOR) 40 mg tablet Take 1 tablet (40 mg total) by mouth at bedtime, Starting Wed 2/26/2025, Normal      hydroCHLOROthiazide 25 mg tablet Take 1 tablet (25 mg total) by mouth every morning, Starting Tue 11/19/2024, Normal      losartan (COZAAR) 100 MG tablet Take 1 tablet (100 mg total) by mouth in the morning, Starting Wed 2/26/2025, Normal      tamsulosin (FLOMAX) 0.4 mg TAKE 1 CAPSULE(0.4 MG) BY MOUTH DAILY  WITH DINNER, Starting Mon 4/28/2025, Normal           No discharge procedures on file.  ED SEPSIS DOCUMENTATION   Time reflects when diagnosis was documented in both MDM as applicable and the Disposition within this note       Time User Action Codes Description Comment    5/21/2025  9:53 AM Nathalie Watts Add [R10.9] Flank pain                    [1]   Social History  Tobacco Use    Smoking status: Never    Smokeless tobacco: Never   Vaping Use    Vaping status: Never Used   Substance Use Topics    Alcohol use: Not Currently    Drug use: Not Currently        Nathalie Watts MD  05/21/25 1031

## 2025-05-21 NOTE — PROGRESS NOTES
"5/21/2025  Trent Fonseca is a 64 y.o. male  [id number]    Diagnosis:      Patient presents for routine catheter exchange/specimen collection managed by our office    Pt reports back pain that started at 3am. Pt seen in ED where they offered to change the pt's catheter and collect a urine sample to rule out UTI. Pt came into office as he stated that he would rather have his catheter changed by urology nurse. He states that the pain has subsided.     Plan:  Plan to follow up for surgery as scheduled   Patient instructed to call with any questions or concerns in the meantime.    Orders Placed This Encounter   Procedures    Urine culture    Urinalysis with microscopic        Vitals:    05/21/25 1119   BP: 146/80   Pulse: (!) 110   SpO2: 97%   Height: 5' 10\" (1.778 m)         Procedure:      Universal Protocol:  procedure performed by consultantConsent: Verbal consent obtained  Risks and benefits: risks, benefits and alternatives were discussed  Consent given by: patient  Patient understanding: patient states understanding of the procedure being performed  Patient consent: the patient's understanding of the procedure matches consent given  Procedure consent: procedure consent matches procedure scheduled  Patient identity confirmed: verbally with patient    Bladder catheterization    Date/Time: 5/21/2025 11:30 AM    Performed by: Lisette Garsia RN  Authorized by: Joselito Larson MD    Patient location:  Bedside  Consent:     Consent given by:  Patient  Universal protocol:     Procedure explained and questions answered to patient or proxy's satisfaction: yes      Patient identity confirmed:  Verbally with patient  Pre-procedure details:     Procedure purpose:  Therapeutic  Anesthesia (see MAR for exact dosages):     Anesthesia method:  None  Procedure details:     Catheter insertion:  Indwelling    Catheter type:  Coude and Bella    Catheter size:  18 Fr    Number of attempts:  1    Successful placement: yes      " Urine characteristics:  Clear  Post-procedure details:     Patient tolerance of procedure:  Tolerated well, no immediate complications  Comments:      Previous rich removed after the deflation of an intact balloon. Site prepped with betadine. New rich inserted without issue for clear urine return. 10mL sterile water inserted into rich balloon. Urine specimen collected. Attached to leg bag.           Lisette Garsia RN

## 2025-05-21 NOTE — TELEPHONE ENCOUNTER
Patient in ED due to left flank pain, thought perhaps a UTI. Pain level was 3 out 10 and now has resolved.  ED discharging him.    The ED wants to do a catheter change to also do a urine sample. Patient would rather not due to past experience of a catheter change in the ED.  Requesting to come to us for a change and urine sample collection rather than waiting till next week at scheduled change    Denies fever, chills, hematuria, pain    Scheduled per request, UA and UCX orders placed

## 2025-05-22 RX ORDER — TAMSULOSIN HYDROCHLORIDE 0.4 MG/1
0.4 CAPSULE ORAL
Qty: 90 CAPSULE | Refills: 1 | Status: SHIPPED | OUTPATIENT
Start: 2025-05-22

## 2025-05-23 ENCOUNTER — RESULTS FOLLOW-UP (OUTPATIENT)
Dept: UROLOGY | Facility: CLINIC | Age: 65
End: 2025-05-23

## 2025-05-23 DIAGNOSIS — R39.9 UTI SYMPTOMS: Primary | ICD-10-CM

## 2025-05-23 LAB — BACTERIA UR CULT: ABNORMAL

## 2025-05-23 NOTE — TELEPHONE ENCOUNTER
Called and spoke with pt and advised. He knows we will call ONLY if abx need to be changed. Pt verbalized understanding.     Clinical to monitor for finalized culture.

## 2025-05-27 ENCOUNTER — OFFICE VISIT (OUTPATIENT)
Dept: UROLOGY | Facility: CLINIC | Age: 65
End: 2025-05-27
Payer: MEDICARE

## 2025-05-27 VITALS
WEIGHT: 193 LBS | HEIGHT: 70 IN | HEART RATE: 91 BPM | BODY MASS INDEX: 27.63 KG/M2 | DIASTOLIC BLOOD PRESSURE: 78 MMHG | OXYGEN SATURATION: 97 % | SYSTOLIC BLOOD PRESSURE: 178 MMHG

## 2025-05-27 DIAGNOSIS — N40.1 URINARY RETENTION DUE TO BENIGN PROSTATIC HYPERPLASIA: Primary | ICD-10-CM

## 2025-05-27 DIAGNOSIS — R97.20 ELEVATED PSA: ICD-10-CM

## 2025-05-27 DIAGNOSIS — E27.8 ADRENAL MASS GREATER THAN 4 CM IN DIAMETER (HCC): ICD-10-CM

## 2025-05-27 DIAGNOSIS — R33.8 URINARY RETENTION DUE TO BENIGN PROSTATIC HYPERPLASIA: Primary | ICD-10-CM

## 2025-05-27 DIAGNOSIS — N39.0 RECURRENT UTI: ICD-10-CM

## 2025-05-27 DIAGNOSIS — R39.9 UTI SYMPTOMS: ICD-10-CM

## 2025-05-27 PROCEDURE — 99215 OFFICE O/P EST HI 40 MIN: CPT | Performed by: UROLOGY

## 2025-05-27 RX ORDER — LEVOFLOXACIN 500 MG/1
500 TABLET, FILM COATED ORAL EVERY 24 HOURS
Qty: 7 TABLET | Refills: 0 | Status: SHIPPED | OUTPATIENT
Start: 2025-05-27 | End: 2025-06-03

## 2025-05-27 NOTE — ASSESSMENT & PLAN NOTE
The patient has a Klebsiella UTI for which he is taking antibiotics.  He will finish his course antibiotics in the few days and then I have instructed him to repeat urine culture a few days later and have also written for empiric Levaquin leading up to his robotic prostatectomy

## 2025-05-27 NOTE — ASSESSMENT & PLAN NOTE
Patient has a benign adrenal mass.  We discussed role for resection as that the size of there is risk for bleeding and potential mass effect on nearby organs.  We could theoretically try to do this at the same time as simple prostatectomy but this will be difficult and require repositioning.  Patient is amenable to having this addressed at a later date.  Will reassess after surgery and his next CT scan

## 2025-05-27 NOTE — ED ATTENDING ATTESTATION
5/21/2025  ILucas MD, saw and evaluated the patient. I have discussed the patient with the resident/non-physician practitioner and agree with the resident's/non-physician practitioner's findings, Plan of Care, and MDM as documented in the resident's/non-physician practitioner's note, except where noted. All available labs and Radiology studies were reviewed.  I was present for key portions of any procedure(s) performed by the resident/non-physician practitioner and I was immediately available to provide assistance.       At this point I agree with the current assessment done in the Emergency Department.  I have conducted an independent evaluation of this patient a history and physical is as follows:see  h and p above -agree with er resident  tx plan/ dispo    ED Course  ED Course as of 05/27/25 0702   Wed May 21, 2025   0912 -er md note previous labs- 3/25  ct scan of abd/pelvis report reviewed by er md   0957 Er md note- initial er workup was first nursed    0958 Er md note-  pt seen  and thoroughly evaluated by er md--  64 yr male with bph-- indwelling  rich catheter -- do to be changed next week ---  for robotic  prostate surg on 6/10-- at around midnight with gradual onset of left mid back pain - 3/10- no radiating pain - no injury - no rash in area-- no fevers- n/v- chills-  pain has now resolved after normal bm-- pt and wife concerned about urinary infection --  - pt went to urology office thsi am - sent down to er-- er md offered to quintinne catheter in er to get clean urien sample to send-- - pt and wife discussed with urology- pt to go back to urology office at 11 am for cathter change-- avss- systolic - htn--  pulse ox 96 # on ra- interpretation is normal- no intervention - very well appearing  in nad-  completely asymptomatic-- mm pink- rrr s1/s2-cta-b/soft nt/nd- no cva tenderness- no peritoneal signs   1001  Office at 11 am fro caht         Critical Care Time  Procedures

## 2025-05-27 NOTE — PROGRESS NOTES
Assessment/Plan:    Adrenal mass greater than 4 cm in diameter (HCC)  Patient has a benign adrenal mass.  We discussed role for resection as that the size of there is risk for bleeding and potential mass effect on nearby organs.  We could theoretically try to do this at the same time as simple prostatectomy but this will be difficult and require repositioning.  Patient is amenable to having this addressed at a later date.  Will reassess after surgery and his next CT scan    Elevated PSA  Patient has an elevated PSA to 7 range but baseline is 5-6 and he is a catheter in place.  Our team has discussed role for prostate MRI to better assess if there is any concerning lesions and he decided to hold off on this.  I think this is reasonable.  There is a chance that we may find prostate cancer via pathology from surgery and this in turn could require additional treatment    Recurrent UTI  The patient has a Klebsiella UTI for which he is taking antibiotics.  He will finish his course antibiotics in the few days and then I have instructed him to repeat urine culture a few days later and have also written for empiric Levaquin leading up to his robotic prostatectomy    Urinary retention due to benign prostatic hyperplasia  Patient is a very large gland with urinary retention.  We discussed this at length.  I think robotic prostatectomy is quite reasonable to approach this.  We discussed the surgical technique and expected hospital stay and catheter duration.  Also discussed risk for bleeding and bowel injury (which is a little bit higher because of prior open appendectomy) and incontinence.  He previously signed consent.          Subjective:      Patient ID: Trent Fonseca is a 64 y.o. male.    HPI    Trent Fonseca is a 64 y.o. male with urinary retention associated with a large prostate and also has a 6 cm left adrenal myolipoma.     He was seen in the ED on 2/9/25 with inability to urinate requiring rich catheter  insertion. PSA was obtained shortly after rich was placed and was elevated at 7. No prior PSAs on file for review. CT in ED showing BPH as well as large adrenal mass.  Leading up to this, he reports intermittent issues with weak urinary stream x years.  He had cystoscopy for by Dr. Larson and was recommended to consider robot simple prostatectomy based on anatomical findings of trilobar hypertrophy with large prostatic extrusion into the bladder component.  Positive volume 120 g.    Patient recently had a Klebsiella UTI based on symptoms of back pain and is on ampicillin right now and issues have improved.    No prior  surgical manipulation. No family history of  malignancy.     Patient's most recent PSA remains elevated at 7.6 which is stable from when checked in February 2025.  He still has a catheter in place.  He reports his PSA has been in 5-6 range for years. No prior prostate biopsy or work-up for this.     The patient was found to have a 6.3 x 6.2 x 5.1 cm left sided adrenal lesion which is arising from the medial limb mostly compromise of bulk fat with peripheral calcifications which likely representative of a myelolipoma of adrenal gland. Initial biochemical testing showed unremarkable result for pheochromocytoma.he had elevated renin levels thought llikely due to ARB use.  He had a negative dexamethasone suppression test he has seen endocrinology who have ordered a repeat CT scan for 3 months or not.    Patient has a history of open appendectomy years ago.    Past Surgical History[1]     Past Medical History[2]     AUA SYMPTOM SCORE      Flowsheet Row Most Recent Value   AUA SYMPTOM SCORE    How often have you had a sensation of not emptying your bladder completely after you finished urinating? 0 (P)     How often have you had to urinate again less than two hours after you finished urinating? 0 (P)     How often have you found you stopped and started again several times when you urinate? 0 (P)    "  How often have you found it difficult to postpone urination? 0 (P)     How often have you had a weak urinary stream? 0 (P)     How often have you had to push or strain to begin urination? 0 (P)     How many times did you most typically get up to urinate from the time you went to bed at night until the time you got up in the morning? 0 (P)     Quality of Life: If you were to spend the rest of your life with your urinary condition just the way it is now, how would you feel about that? 6 (P)     AUA SYMPTOM SCORE 0 (P)               Review of Systems   Constitutional:  Negative for chills and fever.   HENT:  Negative for ear pain and sore throat.    Eyes:  Negative for pain and visual disturbance.   Respiratory:  Negative for cough and shortness of breath.    Cardiovascular:  Negative for chest pain and palpitations.   Gastrointestinal:  Negative for abdominal pain and vomiting.   Genitourinary:  Positive for difficulty urinating. Negative for dysuria and hematuria.   Musculoskeletal:  Negative for arthralgias and back pain.   Skin:  Negative for color change and rash.   Neurological:  Negative for seizures and syncope.   All other systems reviewed and are negative.        Objective:      BP (!) 178/78 (BP Location: Left arm, Patient Position: Sitting, Cuff Size: Adult)   Pulse 91   Ht 5' 10\" (1.778 m)   Wt 87.5 kg (193 lb)   SpO2 97%   BMI 27.69 kg/m²     Lab Results   Component Value Date    PSA 7.628 (H) 05/01/2025    PSA 7.418 (H) 02/12/2025          Physical Exam  Vitals reviewed.   Constitutional:       Appearance: Normal appearance. He is normal weight.   HENT:      Head: Normocephalic and atraumatic.     Eyes:      Pupils: Pupils are equal, round, and reactive to light.     Abdominal:      General: Abdomen is flat. There is no distension.      Palpations: There is no mass.      Tenderness: There is no abdominal tenderness. There is no right CVA tenderness, left CVA tenderness, guarding or rebound.      " Hernia: No hernia is present.      Comments: Scar in the right lower quadrant from open appendectomy   Genitourinary:     Comments: Bella catheter in place draining yellow urine    Neurological:      General: No focal deficit present.      Mental Status: He is alert and oriented to person, place, and time.     Psychiatric:         Mood and Affect: Mood normal.         Thought Content: Thought content normal.           Orders  No orders of the defined types were placed in this encounter.         [1]   Past Surgical History:  Procedure Laterality Date    APPENDECTOMY  2012    COLONOSCOPY      2011,2018,2024   [2]   Past Medical History:  Diagnosis Date    Hyperlipidemia     Hypertension

## 2025-05-27 NOTE — ASSESSMENT & PLAN NOTE
Patient is a very large gland with urinary retention.  We discussed this at length.  I think robotic prostatectomy is quite reasonable to approach this.  We discussed the surgical technique and expected hospital stay and catheter duration.  Also discussed risk for bleeding and bowel injury (which is a little bit higher because of prior open appendectomy) and incontinence.  He previously signed consent.

## 2025-05-27 NOTE — ASSESSMENT & PLAN NOTE
Patient has an elevated PSA to 7 range but baseline is 5-6 and he is a catheter in place.  Our team has discussed role for prostate MRI to better assess if there is any concerning lesions and he decided to hold off on this.  I think this is reasonable.  There is a chance that we may find prostate cancer via pathology from surgery and this in turn could require additional treatment

## 2025-05-29 ENCOUNTER — CONSULT (OUTPATIENT)
Dept: FAMILY MEDICINE CLINIC | Facility: CLINIC | Age: 65
End: 2025-05-29
Payer: MEDICARE

## 2025-05-29 VITALS
BODY MASS INDEX: 27.35 KG/M2 | TEMPERATURE: 97.7 F | HEART RATE: 94 BPM | WEIGHT: 191 LBS | SYSTOLIC BLOOD PRESSURE: 142 MMHG | OXYGEN SATURATION: 99 % | HEIGHT: 70 IN | DIASTOLIC BLOOD PRESSURE: 88 MMHG

## 2025-05-29 DIAGNOSIS — N40.1 URINARY RETENTION DUE TO BENIGN PROSTATIC HYPERPLASIA: ICD-10-CM

## 2025-05-29 DIAGNOSIS — R73.03 PREDIABETES: ICD-10-CM

## 2025-05-29 DIAGNOSIS — Z01.818 ENCOUNTER FOR PREOPERATIVE ASSESSMENT: Primary | ICD-10-CM

## 2025-05-29 DIAGNOSIS — E78.5 HYPERLIPIDEMIA, UNSPECIFIED HYPERLIPIDEMIA TYPE: ICD-10-CM

## 2025-05-29 DIAGNOSIS — I10 PRIMARY HYPERTENSION: ICD-10-CM

## 2025-05-29 DIAGNOSIS — R33.8 URINARY RETENTION DUE TO BENIGN PROSTATIC HYPERPLASIA: ICD-10-CM

## 2025-05-29 PROCEDURE — 99214 OFFICE O/P EST MOD 30 MIN: CPT | Performed by: FAMILY MEDICINE

## 2025-05-29 PROCEDURE — G2211 COMPLEX E/M VISIT ADD ON: HCPCS | Performed by: FAMILY MEDICINE

## 2025-05-29 NOTE — PROGRESS NOTES
Pre-operative Clearance  Name: Trent Fonseca      : 1960      MRN: [id number]  Encounter Provider: Sierra Quiroz DO  Encounter Date: 2025   Encounter department: [facility] [address]    :  Assessment & Plan  Encounter for preoperative assessment         Urinary retention due to benign prostatic hyperplasia         Primary hypertension  Mildly elevated today, continue with ARB and HCTZ.        Prediabetes  continue low carb diet.        Hyperlipidemia, unspecified hyperlipidemia type  Continue Statin.            Pre-operative Clearance:     Revised Cardiac Risk Index:  RCI RISK CLASS II (1 risk factor, risk of major cardiac complications approximately 1.3%)    Clearance:  Patient is medically optimized (CLEARED) for proposed surgery without any additional cardiac testing.       Cardiac stress testing has been negative. No anginal symptoms.     Ptt mildly elevated, will repeat and reassess post operatively. No history of bleeding disorders or issues with prolonged bleeding previously. Recently completed Augmentin, likely cause of mild PTT elevation.     Medication Instructions:   - Avoid herbs or non-directed vitamins one week prior to surgery    - Avoid aspirin containing medications or non-steroidal anti-inflammatory drugs one week preceding surgery    - May take tylenol for pain up until the night before surgery    - ACE Inhibitors or ARBs: Continue this medication up to the evening before surgery/procedure, but do not take the morning of the day of surgery.  - Alpha-1 Adrenergic Blocker (ie, tamsulosin, doxazosin, alfusozin): Continue to take this medication on your normal schedule.  - Diuretics: Continue taking this medication up to the evening before surgery/procedure, but do not take in the morning of the day of surgery/procedure.  - Hyperlipidemia meds: Continue to take this medication on your normal schedule.       History of Present Illness  "    Pre-Op Examination     Surgery: robotic supra pubic prostatectomy   Anticipated Date of Surgery: 6/10/25   Surgeon: Dr. Tidwell     Previous history of bleeding disorders or clots?: No    Previous Anesthesia reaction?: No    Prolonged steroid use in the last 6 months?: No      Assessment of Cardiac Risk:   - Unstable or severe angina or MI in the last 6 weeks or history of stent placement in the last year?: No    - Decompensated heart failure (e.g. New onset heart failure, NYHA  Class IV heart failure, or worsening existing heart failure)?: No    - Significant arrhythmias such as high grade AV block, symptomatic ventricular arrhythmia, newly recognized ventricular tachycardia, supraventricular tachycardia with resting heart rate >100, or symptomatic bradycardia?: No    - Severe heart valve disease including aortic stenosis or symptomatic mitral stenosis?: No      Pre-operative Risk Factors:  - Elevated-risk surgery: No    - History of cerebrovascular disease: No    - History of ischemic heart disease: Yes    - History of congestive heart failure: No    - Pre-operative treatment with insulin: No    - Pre-operative creatinine >2 mg/dL: No      Duke Activity Status Index (DASI):    - DASI Total Score: 58.2   - METs: 9.9     Medications of Perioperative Concern:    Anti-platelet (aspirin, clopidogrel, ticagrelor, prasugrel, cilostazol, dipyridamole) and ACE inhibitors/ARBs    Review of Systems    Past Medical History   Past Medical History[1]  Past Surgical History[2]  Family History[3]  Social History[4]  Medications[5]  No Known Allergies  Objective   /88 (BP Location: Left arm, Patient Position: Sitting, Cuff Size: Standard)   Pulse 94   Temp 97.7 °F (36.5 °C) (Temporal)   Ht 5' 10\" (1.778 m)   Wt 86.6 kg (191 lb)   SpO2 99%   BMI 27.41 kg/m²     Physical Exam  Vitals reviewed.   Constitutional:       General: He is not in acute distress.     Appearance: Normal appearance.   HENT:      Head: " Normocephalic and atraumatic.      Right Ear: External ear normal.      Left Ear: External ear normal.      Nose: Nose normal.      Mouth/Throat:      Mouth: Mucous membranes are moist.     Eyes:      Extraocular Movements: Extraocular movements intact.      Conjunctiva/sclera: Conjunctivae normal.      Pupils: Pupils are equal, round, and reactive to light.       Cardiovascular:      Rate and Rhythm: Normal rate and regular rhythm.      Heart sounds: Normal heart sounds.   Pulmonary:      Effort: Pulmonary effort is normal.      Breath sounds: Normal breath sounds. No wheezing, rhonchi or rales.   Abdominal:      General: Bowel sounds are normal. There is no distension.      Palpations: Abdomen is soft.      Tenderness: There is no abdominal tenderness.     Musculoskeletal:      Cervical back: Neck supple.      Right lower leg: No edema.      Left lower leg: No edema.   Lymphadenopathy:      Cervical: No cervical adenopathy.     Skin:     General: Skin is warm.      Capillary Refill: Capillary refill takes less than 2 seconds.      Findings: No rash.     Neurological:      Mental Status: He is alert. Mental status is at baseline.       Sierra Quiroz DO         [1]   Past Medical History:  Diagnosis Date    BPH (benign prostatic hypertrophy) 2025    inability to urinate    Hyperlipidemia     ((Pt Qnr Sub: na))     Hypertension     ((Pt Qnr Sub: na))    [2]   Past Surgical History:  Procedure Laterality Date    APPENDECTOMY  2013    occurred in [facility]    COLONOSCOPY      ,,   [3]   Family History  Problem Relation Name Age of Onset    Heart disease Father David Haganbriana         4-bypass late 60's,  78 ()    Heart disease Paternal Grandmother Savanna Mohamud Raymundo         Heart condition,  65 years old (1967)    Cancer Mother Ashwini Cowart Coelho         breast (30's) ovarian (70's)  94 ()years    Heart disease Paternal Grandfather David Simmons Raymundo           of heart attack 64 years (1960)    Heart disease Paternal Aunt Meghan Salinas          78 ()   [4]   Social History  Tobacco Use    Smoking status: Never    Smokeless tobacco: Never    Tobacco comments:     ZERO   Vaping Use    Vaping status: Never Used   Substance and Sexual Activity    Alcohol use: Never    Drug use: Never    Sexual activity: Yes     Partners: Female     Birth control/protection: None     Comment:  to one wife for 41 years; only partner ever.   [5]   Current Outpatient Medications on File Prior to Visit   Medication Sig    amoxicillin-clavulanate (AUGMENTIN) 875-125 mg per tablet Take 1 tablet by mouth every 12 (twelve) hours for 7 days    aspirin 81 mg chewable tablet Chew 81 mg in the morning.    atorvastatin (LIPITOR) 40 mg tablet Take 1 tablet (40 mg total) by mouth at bedtime    hydroCHLOROthiazide 25 mg tablet Take 1 tablet (25 mg total) by mouth every morning    levofloxacin (LEVAQUIN) 500 mg tablet Take 1 tablet (500 mg total) by mouth every 24 hours for 7 days Start 2 days before surgery    losartan (COZAAR) 100 MG tablet Take 1 tablet (100 mg total) by mouth in the morning    tamsulosin (FLOMAX) 0.4 mg TAKE 1 CAPSULE(0.4 MG) BY MOUTH DAILY WITH DINNER

## 2025-06-02 ENCOUNTER — APPOINTMENT (OUTPATIENT)
Age: 65
End: 2025-06-02
Payer: MEDICARE

## 2025-06-02 ENCOUNTER — LAB REQUISITION (OUTPATIENT)
Dept: LAB | Facility: HOSPITAL | Age: 65
End: 2025-06-02
Payer: MEDICARE

## 2025-06-02 DIAGNOSIS — N40.1 BENIGN PROSTATIC HYPERPLASIA WITH URINARY RETENTION: ICD-10-CM

## 2025-06-02 DIAGNOSIS — Z01.812 PRE-OPERATIVE LABORATORY EXAMINATION: ICD-10-CM

## 2025-06-02 DIAGNOSIS — R39.89 SUSPECTED UTI: ICD-10-CM

## 2025-06-02 DIAGNOSIS — Z01.818 PREOP EXAMINATION: ICD-10-CM

## 2025-06-02 DIAGNOSIS — Z79.01 LONG TERM (CURRENT) USE OF ANTICOAGULANTS: ICD-10-CM

## 2025-06-02 DIAGNOSIS — R33.8 BENIGN PROSTATIC HYPERPLASIA WITH URINARY RETENTION: ICD-10-CM

## 2025-06-02 DIAGNOSIS — Z01.812 ENCOUNTER FOR PREPROCEDURAL LABORATORY EXAMINATION: ICD-10-CM

## 2025-06-02 LAB
ABO GROUP BLD: NORMAL
ANION GAP SERPL CALCULATED.3IONS-SCNC: 9 MMOL/L (ref 4–13)
APTT PPP: 41 SECONDS (ref 23–34)
BASOPHILS # BLD AUTO: 0.11 THOUSANDS/ÂΜL (ref 0–0.1)
BASOPHILS NFR BLD AUTO: 1 % (ref 0–1)
BLD GP AB SCN SERPL QL: NEGATIVE
BUN SERPL-MCNC: 20 MG/DL (ref 5–25)
CALCIUM SERPL-MCNC: 9.1 MG/DL (ref 8.4–10.2)
CHLORIDE SERPL-SCNC: 95 MMOL/L (ref 96–108)
CO2 SERPL-SCNC: 32 MMOL/L (ref 21–32)
CREAT SERPL-MCNC: 1.36 MG/DL (ref 0.6–1.3)
EOSINOPHIL # BLD AUTO: 0.36 THOUSAND/ÂΜL (ref 0–0.61)
EOSINOPHIL NFR BLD AUTO: 5 % (ref 0–6)
ERYTHROCYTE [DISTWIDTH] IN BLOOD BY AUTOMATED COUNT: 12.7 % (ref 11.6–15.1)
GFR SERPL CREATININE-BSD FRML MDRD: 53 ML/MIN/1.73SQ M
GLUCOSE P FAST SERPL-MCNC: 107 MG/DL (ref 65–99)
HCT VFR BLD AUTO: 45.5 % (ref 36.5–49.3)
HGB BLD-MCNC: 14.9 G/DL (ref 12–17)
IMM GRANULOCYTES # BLD AUTO: 0.03 THOUSAND/UL (ref 0–0.2)
IMM GRANULOCYTES NFR BLD AUTO: 0 % (ref 0–2)
INR PPP: 0.96 (ref 0.85–1.19)
LYMPHOCYTES # BLD AUTO: 1.66 THOUSANDS/ÂΜL (ref 0.6–4.47)
LYMPHOCYTES NFR BLD AUTO: 21 % (ref 14–44)
MCH RBC QN AUTO: 29 PG (ref 26.8–34.3)
MCHC RBC AUTO-ENTMCNC: 32.7 G/DL (ref 31.4–37.4)
MCV RBC AUTO: 89 FL (ref 82–98)
MONOCYTES # BLD AUTO: 0.87 THOUSAND/ÂΜL (ref 0.17–1.22)
MONOCYTES NFR BLD AUTO: 11 % (ref 4–12)
NEUTROPHILS # BLD AUTO: 5.04 THOUSANDS/ÂΜL (ref 1.85–7.62)
NEUTS SEG NFR BLD AUTO: 62 % (ref 43–75)
NRBC BLD AUTO-RTO: 0 /100 WBCS
PLATELET # BLD AUTO: 347 THOUSANDS/UL (ref 149–390)
PMV BLD AUTO: 10.4 FL (ref 8.9–12.7)
POTASSIUM SERPL-SCNC: 3.6 MMOL/L (ref 3.5–5.3)
PROTHROMBIN TIME: 13.1 SECONDS (ref 12.3–15)
RBC # BLD AUTO: 5.14 MILLION/UL (ref 3.88–5.62)
RH BLD: POSITIVE
SODIUM SERPL-SCNC: 136 MMOL/L (ref 135–147)
SPECIMEN EXPIRATION DATE: NORMAL
WBC # BLD AUTO: 8.07 THOUSAND/UL (ref 4.31–10.16)

## 2025-06-02 PROCEDURE — 85610 PROTHROMBIN TIME: CPT

## 2025-06-02 PROCEDURE — 87086 URINE CULTURE/COLONY COUNT: CPT

## 2025-06-02 PROCEDURE — 86900 BLOOD TYPING SEROLOGIC ABO: CPT | Performed by: UROLOGY

## 2025-06-02 PROCEDURE — 87186 SC STD MICRODIL/AGAR DIL: CPT

## 2025-06-02 PROCEDURE — 87077 CULTURE AEROBIC IDENTIFY: CPT

## 2025-06-02 PROCEDURE — 36415 COLL VENOUS BLD VENIPUNCTURE: CPT

## 2025-06-02 PROCEDURE — 85025 COMPLETE CBC W/AUTO DIFF WBC: CPT

## 2025-06-02 PROCEDURE — 86850 RBC ANTIBODY SCREEN: CPT | Performed by: UROLOGY

## 2025-06-02 PROCEDURE — 85730 THROMBOPLASTIN TIME PARTIAL: CPT

## 2025-06-02 PROCEDURE — 80048 BASIC METABOLIC PNL TOTAL CA: CPT

## 2025-06-02 PROCEDURE — 86901 BLOOD TYPING SEROLOGIC RH(D): CPT | Performed by: UROLOGY

## 2025-06-04 ENCOUNTER — TELEPHONE (OUTPATIENT)
Age: 65
End: 2025-06-04

## 2025-06-04 ENCOUNTER — RESULTS FOLLOW-UP (OUTPATIENT)
Age: 65
End: 2025-06-04

## 2025-06-04 DIAGNOSIS — N39.0 RECURRENT UTI: Primary | ICD-10-CM

## 2025-06-04 LAB — BACTERIA UR CULT: ABNORMAL

## 2025-06-04 RX ORDER — LEVOFLOXACIN 500 MG/1
500 TABLET, FILM COATED ORAL EVERY 24 HOURS
Qty: 7 TABLET | Refills: 0 | Status: SHIPPED | OUTPATIENT
Start: 2025-06-04 | End: 2025-06-11

## 2025-06-04 NOTE — TELEPHONE ENCOUNTER
Patient called to see if Dr Quiroz was able to review the labs he had done on Monday 6/2/25. Patient would like a call back with any further recommendations.

## 2025-06-05 NOTE — TELEPHONE ENCOUNTER
Pre op encounter updated. Patient cleared. Mild elevation in PTT, no history of bleeding disorder or prolonged bleeding. Likely mildly elevated due to recent Augmentin. Surgeon notified that patient is cleared.     DO Ramos Amezcua [facility]  6/5/2025 5:35 PM

## 2025-06-06 ENCOUNTER — PATIENT MESSAGE (OUTPATIENT)
Dept: FAMILY MEDICINE CLINIC | Facility: CLINIC | Age: 65
End: 2025-06-06

## 2025-06-06 ENCOUNTER — TELEPHONE (OUTPATIENT)
Age: 65
End: 2025-06-06

## 2025-06-06 NOTE — TELEPHONE ENCOUNTER
Patient's spouse, Jeannine called inquiring about the ensure that was given to the patient in the office for his upcoming surgery on 6/10. Three bottles were received with instructions of drink one after dinner, drink second before bed and drink the third 1-2 hours before surgery.  Please clarify if the last ensure is to be consumed morning of surgery. Patient was instructed to have nothing after midnight by pre-op nurse.       Please advise and Jeannine MCFADDEN #  [phone]

## 2025-06-06 NOTE — PATIENT COMMUNICATION
Patient called back, message from Dr Quiroz relayed to patient. Patient verbalized understanding and does not have any additional questions or concerns.

## 2025-06-09 ENCOUNTER — ANESTHESIA EVENT (OUTPATIENT)
Dept: PERIOP | Facility: HOSPITAL | Age: 65
End: 2025-06-09
Payer: MEDICARE

## 2025-06-09 NOTE — ANESTHESIA PREPROCEDURE EVALUATION
Procedure:  PROSTATECTOMY,SUPRAPUBIC LAPAROSCOPIC WITH ROBOTICS (Abdomen)    Relevant Problems   ANESTHESIA (within normal limits)      CARDIO   (+) Agatston coronary artery calcium score greater than 400, 2338- 2/2025   (+) Hyperlipidemia   (+) Primary hypertension      /RENAL   (+) Benign prostate hyperplasia      Other   (+) Adrenal mass greater than 4 cm in diameter (HCC)   (+) Prediabetes        Physical Exam    Airway     Mallampati score: II  TM Distance: >3 FB  Neck ROM: full      Cardiovascular      Dental   Comment: Denies loose teeth     Pulmonary      Neurological      Other Findings        Anesthesia Plan  ASA Score- 2     Anesthesia Type- general with ASA Monitors.         Additional Monitors:     Airway Plan:     Comment: Shaina NANCE  PIVx2.       Plan Factors-Exercise tolerance (METS): >4 METS.    Chart reviewed. EKG reviewed.  Existing labs reviewed. Patient summary reviewed.    Patient is not a current smoker.      Obstructive sleep apnea risk education given perioperatively.        Induction- intravenous.    Postoperative Plan- .   Monitoring Plan - Monitoring plan - standard ASA monitoring  Post Operative Pain Plan - multimodal analgesia        Informed Consent- Anesthetic plan and risks discussed with patient.  I personally reviewed this patient with the CRNA. Discussed and agreed on the Anesthesia Plan with the CRNA..      NPO Status:  No vitals data found for the desired time range.

## 2025-06-10 ENCOUNTER — ANESTHESIA (OUTPATIENT)
Dept: PERIOP | Facility: HOSPITAL | Age: 65
End: 2025-06-10
Payer: MEDICARE

## 2025-06-10 ENCOUNTER — HOSPITAL ENCOUNTER (OUTPATIENT)
Facility: HOSPITAL | Age: 65
Setting detail: OUTPATIENT SURGERY
Discharge: HOME | End: 2025-06-11
Attending: UROLOGY | Admitting: UROLOGY
Payer: MEDICARE

## 2025-06-10 DIAGNOSIS — N40.1 BENIGN PROSTATIC HYPERPLASIA WITH URINARY RETENTION: ICD-10-CM

## 2025-06-10 DIAGNOSIS — R33.8 BENIGN PROSTATIC HYPERPLASIA WITH URINARY RETENTION: ICD-10-CM

## 2025-06-10 LAB
ABO GROUP BLD: NORMAL
ANION GAP SERPL CALCULATED.3IONS-SCNC: 12 MMOL/L (ref 4–13)
BUN SERPL-MCNC: 18 MG/DL (ref 5–25)
CALCIUM SERPL-MCNC: 9 MG/DL (ref 8.4–10.2)
CHLORIDE SERPL-SCNC: 100 MMOL/L (ref 96–108)
CO2 SERPL-SCNC: 26 MMOL/L (ref 21–32)
CREAT SERPL-MCNC: 1.5 MG/DL (ref 0.6–1.3)
GFR SERPL CREATININE-BSD FRML MDRD: 47 ML/MIN/1.73SQ M
GLUCOSE P FAST SERPL-MCNC: 152 MG/DL (ref 65–99)
GLUCOSE SERPL-MCNC: 131 MG/DL (ref 65–140)
GLUCOSE SERPL-MCNC: 152 MG/DL (ref 65–140)
HCT VFR BLD AUTO: 42 % (ref 36.5–49.3)
HGB BLD-MCNC: 13.9 G/DL (ref 12–17)
POTASSIUM SERPL-SCNC: 2.9 MMOL/L (ref 3.5–5.3)
RH BLD: POSITIVE
SODIUM SERPL-SCNC: 138 MMOL/L (ref 135–147)

## 2025-06-10 PROCEDURE — 80048 BASIC METABOLIC PNL TOTAL CA: CPT | Performed by: UROLOGY

## 2025-06-10 PROCEDURE — 55867 LAPS SURG PRST8ECT SMPL STOT: CPT | Performed by: PHYSICIAN ASSISTANT

## 2025-06-10 PROCEDURE — NC001 PR NO CHARGE: Performed by: PHYSICIAN ASSISTANT

## 2025-06-10 PROCEDURE — 88307 TISSUE EXAM BY PATHOLOGIST: CPT | Performed by: STUDENT IN AN ORGANIZED HEALTH CARE EDUCATION/TRAINING PROGRAM

## 2025-06-10 PROCEDURE — 86923 COMPATIBILITY TEST ELECTRIC: CPT

## 2025-06-10 PROCEDURE — 82948 REAGENT STRIP/BLOOD GLUCOSE: CPT

## 2025-06-10 PROCEDURE — 55867 LAPS SURG PRST8ECT SMPL STOT: CPT | Performed by: UROLOGY

## 2025-06-10 PROCEDURE — 85014 HEMATOCRIT: CPT | Performed by: UROLOGY

## 2025-06-10 PROCEDURE — 85018 HEMOGLOBIN: CPT | Performed by: UROLOGY

## 2025-06-10 RX ORDER — ROCURONIUM BROMIDE 10 MG/ML
INJECTION, SOLUTION INTRAVENOUS AS NEEDED
Status: DISCONTINUED | OUTPATIENT
Start: 2025-06-10 | End: 2025-06-10

## 2025-06-10 RX ORDER — BACITRACIN, NEOMYCIN, POLYMYXIN B 400; 3.5; 5 [USP'U]/G; MG/G; [USP'U]/G
1 OINTMENT TOPICAL 2 TIMES DAILY
Status: DISCONTINUED | OUTPATIENT
Start: 2025-06-10 | End: 2025-06-11 | Stop reason: HOSPADM

## 2025-06-10 RX ORDER — TRANEXAMIC ACID 10 MG/ML
INJECTION, SOLUTION INTRAVENOUS AS NEEDED
Status: DISCONTINUED | OUTPATIENT
Start: 2025-06-10 | End: 2025-06-10

## 2025-06-10 RX ORDER — CEFAZOLIN SODIUM 1 G/3ML
INJECTION, POWDER, FOR SOLUTION INTRAMUSCULAR; INTRAVENOUS AS NEEDED
Status: DISCONTINUED | OUTPATIENT
Start: 2025-06-10 | End: 2025-06-10

## 2025-06-10 RX ORDER — SENNOSIDES 8.6 MG
1 TABLET ORAL DAILY
Status: DISCONTINUED | OUTPATIENT
Start: 2025-06-10 | End: 2025-06-11 | Stop reason: HOSPADM

## 2025-06-10 RX ORDER — CIPROFLOXACIN 2 MG/ML
400 INJECTION, SOLUTION INTRAVENOUS
Status: COMPLETED | OUTPATIENT
Start: 2025-06-10 | End: 2025-06-10

## 2025-06-10 RX ORDER — SODIUM CHLORIDE, SODIUM LACTATE, POTASSIUM CHLORIDE, CALCIUM CHLORIDE 600; 310; 30; 20 MG/100ML; MG/100ML; MG/100ML; MG/100ML
INJECTION, SOLUTION INTRAVENOUS CONTINUOUS PRN
Status: DISCONTINUED | OUTPATIENT
Start: 2025-06-10 | End: 2025-06-10

## 2025-06-10 RX ORDER — GLYCOPYRROLATE 0.2 MG/ML
INJECTION INTRAMUSCULAR; INTRAVENOUS AS NEEDED
Status: DISCONTINUED | OUTPATIENT
Start: 2025-06-10 | End: 2025-06-10

## 2025-06-10 RX ORDER — ONDANSETRON 2 MG/ML
4 INJECTION INTRAMUSCULAR; INTRAVENOUS ONCE AS NEEDED
Status: DISCONTINUED | OUTPATIENT
Start: 2025-06-10 | End: 2025-06-10 | Stop reason: HOSPADM

## 2025-06-10 RX ORDER — DIPHENHYDRAMINE HYDROCHLORIDE 50 MG/ML
12.5 INJECTION, SOLUTION INTRAMUSCULAR; INTRAVENOUS ONCE AS NEEDED
Status: DISCONTINUED | OUTPATIENT
Start: 2025-06-10 | End: 2025-06-10 | Stop reason: HOSPADM

## 2025-06-10 RX ORDER — ACETAMINOPHEN 325 MG/1
650 TABLET ORAL EVERY 6 HOURS SCHEDULED
Status: DISCONTINUED | OUTPATIENT
Start: 2025-06-10 | End: 2025-06-11 | Stop reason: HOSPADM

## 2025-06-10 RX ORDER — BUPIVACAINE HYDROCHLORIDE 5 MG/ML
INJECTION, SOLUTION EPIDURAL; INTRACAUDAL; PERINEURAL AS NEEDED
Status: DISCONTINUED | OUTPATIENT
Start: 2025-06-10 | End: 2025-06-10 | Stop reason: HOSPADM

## 2025-06-10 RX ORDER — POTASSIUM CHLORIDE 14.9 MG/ML
20 INJECTION INTRAVENOUS ONCE
Status: COMPLETED | OUTPATIENT
Start: 2025-06-10 | End: 2025-06-10

## 2025-06-10 RX ORDER — POTASSIUM CHLORIDE 1500 MG/1
40 TABLET, EXTENDED RELEASE ORAL ONCE
Status: COMPLETED | OUTPATIENT
Start: 2025-06-10 | End: 2025-06-10

## 2025-06-10 RX ORDER — CEFAZOLIN SODIUM 2 G/50ML
2000 SOLUTION INTRAVENOUS EVERY 8 HOURS
Status: COMPLETED | OUTPATIENT
Start: 2025-06-10 | End: 2025-06-11

## 2025-06-10 RX ORDER — FENTANYL CITRATE/PF 50 MCG/ML
25 SYRINGE (ML) INJECTION
Status: DISCONTINUED | OUTPATIENT
Start: 2025-06-10 | End: 2025-06-10 | Stop reason: HOSPADM

## 2025-06-10 RX ORDER — SODIUM CHLORIDE, SODIUM LACTATE, POTASSIUM CHLORIDE, CALCIUM CHLORIDE 600; 310; 30; 20 MG/100ML; MG/100ML; MG/100ML; MG/100ML
125 INJECTION, SOLUTION INTRAVENOUS CONTINUOUS
Status: DISCONTINUED | OUTPATIENT
Start: 2025-06-10 | End: 2025-06-11 | Stop reason: HOSPADM

## 2025-06-10 RX ORDER — EPHEDRINE SULFATE 50 MG/ML
INJECTION INTRAVENOUS AS NEEDED
Status: DISCONTINUED | OUTPATIENT
Start: 2025-06-10 | End: 2025-06-10

## 2025-06-10 RX ORDER — HYDROMORPHONE HCL/PF 1 MG/ML
SYRINGE (ML) INJECTION AS NEEDED
Status: DISCONTINUED | OUTPATIENT
Start: 2025-06-10 | End: 2025-06-10

## 2025-06-10 RX ORDER — ONDANSETRON 2 MG/ML
INJECTION INTRAMUSCULAR; INTRAVENOUS AS NEEDED
Status: DISCONTINUED | OUTPATIENT
Start: 2025-06-10 | End: 2025-06-10

## 2025-06-10 RX ORDER — PROPOFOL 10 MG/ML
INJECTION, EMULSION INTRAVENOUS AS NEEDED
Status: DISCONTINUED | OUTPATIENT
Start: 2025-06-10 | End: 2025-06-10

## 2025-06-10 RX ORDER — FENTANYL CITRATE 50 UG/ML
INJECTION, SOLUTION INTRAMUSCULAR; INTRAVENOUS AS NEEDED
Status: DISCONTINUED | OUTPATIENT
Start: 2025-06-10 | End: 2025-06-10

## 2025-06-10 RX ORDER — NEOSTIGMINE METHYLSULFATE 1 MG/ML
INJECTION INTRAVENOUS AS NEEDED
Status: DISCONTINUED | OUTPATIENT
Start: 2025-06-10 | End: 2025-06-10

## 2025-06-10 RX ORDER — ONDANSETRON 2 MG/ML
4 INJECTION INTRAMUSCULAR; INTRAVENOUS EVERY 6 HOURS PRN
Status: DISCONTINUED | OUTPATIENT
Start: 2025-06-10 | End: 2025-06-11 | Stop reason: HOSPADM

## 2025-06-10 RX ORDER — DEXAMETHASONE SODIUM PHOSPHATE 10 MG/ML
INJECTION, SOLUTION INTRAMUSCULAR; INTRAVENOUS AS NEEDED
Status: DISCONTINUED | OUTPATIENT
Start: 2025-06-10 | End: 2025-06-10

## 2025-06-10 RX ORDER — OXYBUTYNIN CHLORIDE 5 MG/1
5 TABLET ORAL 2 TIMES DAILY
Status: DISCONTINUED | OUTPATIENT
Start: 2025-06-10 | End: 2025-06-11 | Stop reason: HOSPADM

## 2025-06-10 RX ORDER — MAGNESIUM HYDROXIDE 1200 MG/15ML
3000 LIQUID ORAL CONTINUOUS
Status: DISCONTINUED | OUTPATIENT
Start: 2025-06-10 | End: 2025-06-11 | Stop reason: HOSPADM

## 2025-06-10 RX ORDER — HYDROMORPHONE HCL/PF 1 MG/ML
0.5 SYRINGE (ML) INJECTION
Status: DISCONTINUED | OUTPATIENT
Start: 2025-06-10 | End: 2025-06-10 | Stop reason: HOSPADM

## 2025-06-10 RX ORDER — MIDAZOLAM HYDROCHLORIDE 2 MG/2ML
INJECTION, SOLUTION INTRAMUSCULAR; INTRAVENOUS AS NEEDED
Status: DISCONTINUED | OUTPATIENT
Start: 2025-06-10 | End: 2025-06-10

## 2025-06-10 RX ORDER — ATORVASTATIN CALCIUM 40 MG/1
40 TABLET, FILM COATED ORAL
Status: DISCONTINUED | OUTPATIENT
Start: 2025-06-10 | End: 2025-06-11 | Stop reason: HOSPADM

## 2025-06-10 RX ORDER — LIDOCAINE HYDROCHLORIDE 20 MG/ML
INJECTION, SOLUTION EPIDURAL; INFILTRATION; INTRACAUDAL; PERINEURAL AS NEEDED
Status: DISCONTINUED | OUTPATIENT
Start: 2025-06-10 | End: 2025-06-10

## 2025-06-10 RX ORDER — FINASTERIDE 5 MG/1
5 TABLET, FILM COATED ORAL DAILY
Status: DISCONTINUED | OUTPATIENT
Start: 2025-06-10 | End: 2025-06-11 | Stop reason: HOSPADM

## 2025-06-10 RX ORDER — DOCUSATE SODIUM 100 MG/1
100 CAPSULE, LIQUID FILLED ORAL 2 TIMES DAILY
Status: DISCONTINUED | OUTPATIENT
Start: 2025-06-10 | End: 2025-06-11 | Stop reason: HOSPADM

## 2025-06-10 RX ADMIN — DEXAMETHASONE SODIUM PHOSPHATE 10 MG: 10 INJECTION INTRAMUSCULAR; INTRAVENOUS at 07:33

## 2025-06-10 RX ADMIN — BACITRACIN ZINC, NEOMYCIN, POLYMYXIN B 1 LARGE APPLICATION: 400; 3.5; 5 OINTMENT TOPICAL at 15:13

## 2025-06-10 RX ADMIN — DOCUSATE SODIUM 100 MG: 100 CAPSULE, LIQUID FILLED ORAL at 17:31

## 2025-06-10 RX ADMIN — MIDAZOLAM HYDROCHLORIDE 2 MG: 1 INJECTION, SOLUTION INTRAMUSCULAR; INTRAVENOUS at 07:24

## 2025-06-10 RX ADMIN — EPHEDRINE SULFATE 10 MG: 50 INJECTION INTRAVENOUS at 08:10

## 2025-06-10 RX ADMIN — OXYBUTYNIN CHLORIDE 5 MG: 5 TABLET ORAL at 15:13

## 2025-06-10 RX ADMIN — ACETAMINOPHEN 650 MG: 325 TABLET ORAL at 17:30

## 2025-06-10 RX ADMIN — HYDROMORPHONE HYDROCHLORIDE 0.5 MG: 1 INJECTION, SOLUTION INTRAMUSCULAR; INTRAVENOUS; SUBCUTANEOUS at 11:11

## 2025-06-10 RX ADMIN — CIPROFLOXACIN: 2 INJECTION, SOLUTION INTRAVENOUS at 07:24

## 2025-06-10 RX ADMIN — PROPOFOL 200 MG: 10 INJECTION, EMULSION INTRAVENOUS at 07:33

## 2025-06-10 RX ADMIN — ATORVASTATIN CALCIUM 40 MG: 40 TABLET, FILM COATED ORAL at 22:11

## 2025-06-10 RX ADMIN — LIDOCAINE HYDROCHLORIDE 100 MG: 20 INJECTION, SOLUTION EPIDURAL; INFILTRATION; INTRACAUDAL at 07:33

## 2025-06-10 RX ADMIN — ROCURONIUM 20 MG: 50 INJECTION, SOLUTION INTRAVENOUS at 09:08

## 2025-06-10 RX ADMIN — DOCUSATE SODIUM 100 MG: 100 CAPSULE, LIQUID FILLED ORAL at 13:28

## 2025-06-10 RX ADMIN — PHENYLEPHRINE HYDROCHLORIDE 200 MCG: 50 INJECTION INTRAVENOUS at 08:07

## 2025-06-10 RX ADMIN — CEFAZOLIN 2000 MG: 1 INJECTION, POWDER, FOR SOLUTION INTRAMUSCULAR; INTRAVENOUS at 08:00

## 2025-06-10 RX ADMIN — NEOSTIGMINE METHYLSULFATE 5 MG: 1 INJECTION INTRAVENOUS at 11:07

## 2025-06-10 RX ADMIN — SODIUM CHLORIDE, SODIUM LACTATE, POTASSIUM CHLORIDE, AND CALCIUM CHLORIDE 125 ML/HR: .6; .31; .03; .02 INJECTION, SOLUTION INTRAVENOUS at 13:39

## 2025-06-10 RX ADMIN — CEFAZOLIN SODIUM 2000 MG: 2 SOLUTION INTRAVENOUS at 15:20

## 2025-06-10 RX ADMIN — TRANEXAMIC ACID 1000 MG: 10 INJECTION, SOLUTION INTRAVENOUS at 10:18

## 2025-06-10 RX ADMIN — FENTANYL CITRATE 100 MCG: 50 INJECTION INTRAMUSCULAR; INTRAVENOUS at 07:33

## 2025-06-10 RX ADMIN — FINASTERIDE 5 MG: 5 TABLET, FILM COATED ORAL at 15:13

## 2025-06-10 RX ADMIN — POTASSIUM CHLORIDE 20 MEQ: 14.9 INJECTION, SOLUTION INTRAVENOUS at 14:32

## 2025-06-10 RX ADMIN — ROCURONIUM 30 MG: 50 INJECTION, SOLUTION INTRAVENOUS at 10:38

## 2025-06-10 RX ADMIN — SODIUM CHLORIDE, SODIUM LACTATE, POTASSIUM CHLORIDE, AND CALCIUM CHLORIDE: .6; .31; .03; .02 INJECTION, SOLUTION INTRAVENOUS at 07:17

## 2025-06-10 RX ADMIN — GLYCOPYRROLATE 0.8 MG: 0.2 INJECTION, SOLUTION INTRAMUSCULAR; INTRAVENOUS at 11:07

## 2025-06-10 RX ADMIN — HYDROMORPHONE HYDROCHLORIDE 0.5 MG: 1 INJECTION, SOLUTION INTRAMUSCULAR; INTRAVENOUS; SUBCUTANEOUS at 09:13

## 2025-06-10 RX ADMIN — SODIUM CHLORIDE, SODIUM LACTATE, POTASSIUM CHLORIDE, AND CALCIUM CHLORIDE: .6; .31; .03; .02 INJECTION, SOLUTION INTRAVENOUS at 10:41

## 2025-06-10 RX ADMIN — SENNOSIDES 8.6 MG: 8.6 TABLET, FILM COATED ORAL at 13:28

## 2025-06-10 RX ADMIN — BACITRACIN ZINC, NEOMYCIN, POLYMYXIN B 1 LARGE APPLICATION: 400; 3.5; 5 OINTMENT TOPICAL at 17:31

## 2025-06-10 RX ADMIN — ACETAMINOPHEN 650 MG: 325 TABLET ORAL at 13:28

## 2025-06-10 RX ADMIN — SODIUM CHLORIDE FOR IRRIGATION 3000 ML: 0.9 SOLUTION IRRIGATION at 13:27

## 2025-06-10 RX ADMIN — ROCURONIUM 50 MG: 50 INJECTION, SOLUTION INTRAVENOUS at 07:33

## 2025-06-10 RX ADMIN — SODIUM CHLORIDE, SODIUM LACTATE, POTASSIUM CHLORIDE, AND CALCIUM CHLORIDE 125 ML/HR: .6; .31; .03; .02 INJECTION, SOLUTION INTRAVENOUS at 22:10

## 2025-06-10 RX ADMIN — ONDANSETRON 4 MG: 2 INJECTION, SOLUTION INTRAMUSCULAR; INTRAVENOUS at 07:33

## 2025-06-10 RX ADMIN — POTASSIUM CHLORIDE 40 MEQ: 1500 TABLET, EXTENDED RELEASE ORAL at 15:12

## 2025-06-10 RX ADMIN — PHENYLEPHRINE HYDROCHLORIDE 40 MCG/MIN: 50 INJECTION INTRAVENOUS at 07:53

## 2025-06-10 NOTE — INTERVAL H&P NOTE
H&P reviewed. After examining the patient I find no changes in the patients condition since the H&P had been written.    Vitals:    06/10/25 0638   BP: 139/87   Pulse: 95   Resp: 17   Temp: 97.9 °F (36.6 °C)   SpO2: 96%     Patient seen and examined prior to surgery.  We we discussed plan for robot simple prostatectomy.  He has been taking antibiotics for last 2 days based on preoperative urine culture which grew Pseudomonas.    Exam  No acute distress  Regular rate rhythm  Clear to auscultation bilaterally  Abdomen soft nontender nondistended  Bella catheter in place draining yellow slightly cloudy urine  Lower extremities no edema bilaterally

## 2025-06-10 NOTE — DISCHARGE INSTR - AVS FIRST PAGE
Mr. Fonseca,    Your robotic simple prostatectomy surgery went well.  We removed a large adenoma from the prostate.  This should hopefully help you void after catheter is removed.    Will plan to keep the catheter placed for 2 weeks as previously discussed to help your bladder heal from the incision created to perform surgery.  The nursing team will teach you how to take care of your rich catheter including how to drain it and switch from a leg bag to the larger bag.    Please restart your Levaquin antibiotics starting 2 days before catheter removal and finish the course.    Keep your incisions clean and dry.  The glue on your skin will fall off on its own in 3-4 weeks.      Drink plenty of fluids and eat when you are hungry.     You may shower at will, but do not submerge yourself in a bath or hot tub for 2 weeks.     It is normal to have some drainage around your catheter.  You may feel the need to urinate and you may notice some leakage around the catheter during this time.  You need not worry about this, but only worry if the catheter is not draining at all.      You may also notice a creamy yellow brown substance around where the catheter enters the penis, these are normal periurethral secretions and they do not signify infection.      You have been prescribed an aggressive bowel regimen (1 cap of MIRALAX DAILY, and 1 COLACE THREE TIMES PER DAY).  Compliance with this regimen is important to avoid constipation and straining.    You are encouraged to walk around and climb steps, but you should not do any lifting  of anything heavier than 10 pounds for 6 weeks.  A jug of milk weighs roughly 10 pounds, and as such do not lift anything heavier than the jug of milk.    Please call us if you have questions or concerns.  Jaime,  Cholo Tidwell MD    [facility] [phone]    Suprapubic Prostatectomy   WHAT YOU NEED TO KNOW:   Suprapubic prostatectomy is surgery to remove part or all of your prostate gland. Your  prostate gland is found below your bladder and surrounds the top of your urethra. Your urethra is a tube that carries urine from your bladder to the outside of your body. You may need suprapubic prostatectomy if you have an enlarged prostate.       DISCHARGE INSTRUCTIONS:   Call your local emergency number (911 in the [place]) for any of the following:   You feel lightheaded, short of breath, and have chest pain.    You cough up blood.    Call your doctor or surgeon if:   Your leg feels warm, tender, and painful. It may look swollen and red.    Blood soaks through your bandage.    You feel the urge to urinate, but no urine comes out.    You have pain in your lower back or abdomen that does not go away.    Your scrotum becomes swollen.    You have a fever or chills.    Your wound is red, swollen, or draining pus.    You have bright red blood in your urine, or your urine is cloudy and smells bad.    Your urine stream becomes slower than normal, or you are urinating only small amounts.    You have questions or concerns about your condition or care.    Medicines:   Antibiotics  help prevent an infection.    Prescription pain medicine  may be given. Ask your healthcare provider how to take this medicine safely. Some prescription pain medicines contain acetaminophen. Do not take other medicines that contain acetaminophen without talking to your healthcare provider. Too much acetaminophen may cause liver damage. Prescription pain medicine may cause constipation. Ask your healthcare provider how to prevent or treat constipation.     Blood thinners  help prevent blood clots. Clots can cause strokes, heart attacks, and death. The following are general safety guidelines to follow while you are taking a blood thinner:    Watch for bleeding and bruising while you take blood thinners. Watch for bleeding from your gums or nose. Watch for blood in your urine and bowel movements. Use a soft washcloth on your skin, and a soft toothbrush  to brush your teeth. This can keep your skin and gums from bleeding. If you shave, use an electric shaver. Do not play contact sports.     Tell your dentist and other healthcare providers that you take a blood thinner. Wear a bracelet or necklace that says you take this medicine.     Do not start or stop any other medicines unless your healthcare provider tells you to. Many medicines cannot be used with blood thinners.    Take your blood thinner exactly as prescribed by your healthcare provider. Do not skip does or take less than prescribed. Tell your provider right away if you forget to take your blood thinner, or if you take too much.    Warfarin  is a blood thinner that you may need to take. The following are things you should be aware of if you take warfarin:     Foods and medicines can affect the amount of warfarin in your blood. Do not make major changes to your diet while you take warfarin. Warfarin works best when you eat about the same amount of vitamin K every day. Vitamin K is found in green leafy vegetables and certain other foods. Ask for more information about what to eat when you are taking warfarin.    You will need to see your healthcare provider for follow-up visits when you are on warfarin. You will need regular blood tests. These tests are used to decide how much medicine you need.    Take your medicine as directed.  Contact your healthcare provider if you think your medicine is not helping or if you have side effects. Tell him or her if you are allergic to any medicine. Keep a list of the medicines, vitamins, and herbs you take. Include the amounts, and when and why you take them. Bring the list or the pill bottles to follow-up visits. Carry your medicine list with you in case of an emergency.    Bella catheter care:  Keep the bag below your waist. If the bag is too high, urine will flow back into your bladder. This can cause an infection. Do not pull on the catheter. This may cause pain and  bleeding, and the catheter may come out. Do not let the catheter tubing kink or twist. A kink or twist will block the flow of urine.  Help decrease urine leakage:  After surgery, you may leak urine and have trouble controlling when you urinate. Ask for more information about the following ways to help decrease urine leakage:  Avoid caffeine.  It can cause problems with bladder control and increase your need to urinate.    Do pelvic floor muscle exercises.  They may help improve your bladder control. These exercises are done by tightening and relaxing your pelvic muscles. Ask how to do pelvic floor muscle exercises, and how often to do them.    Limit your liquid intake.  Drink smaller amounts of liquid throughout the day. Do not drink before bedtime. Ask if you should decrease the amount of liquid you drink each day. This may help you control your bladder.    Wear a pad or adult diapers.  These may help absorb leaking urine and decrease odor.    Return to your usual activities as directed:  Rest when you need to while you heal after surgery. Slowly start to do more each day. Return to your daily activities as directed. You may be able to return to your daily activities in 4 to 6 weeks.  Follow up with your doctor or surgeon as directed:  You may need to return to have your catheter removed. You may also need tests to check if you bladder empties completely when you urinate. Write down your questions so you remember to ask them during your visits.  © Copyright [employer] 2022 Information is for End User's use only and may not be sold, redistributed or otherwise used for commercial purposes. All illustrations and images included in CareNotes® are the copyrighted property of [employer] or [employer]  The above information is an  only. It is not intended as medical advice for individual conditions or treatments. Talk to your doctor, nurse or pharmacist before following any medical regimen  to see if it is safe and effective for you.

## 2025-06-10 NOTE — ANESTHESIA POSTPROCEDURE EVALUATION
Post-Op Assessment Note    CV Status:  Stable  Pain Score: 0    Pain management: adequate       Mental Status:  Arousable and sleepy   Hydration Status:  Euvolemic and stable   PONV Controlled:  Controlled   Airway Patency:  Patent and adequate     Post Op Vitals Reviewed: Yes    No anethesia notable event occurred.    Staff: CRNA           Last Filed PACU Vitals:  Vitals Value Taken Time   Temp     Pulse 84 06/10/25 11:39   BP     Resp 19 06/10/25 11:39   SpO2 100 % 06/10/25 11:39   Vitals shown include unfiled device data.

## 2025-06-10 NOTE — RESPIRATORY THERAPY NOTE
"RT Protocol Note  Trent Fonseca 64 y.o. male MRN: [id number]  Unit/Bed#: S -01 Encounter: [id number]    Assessment    Principal Problem:    Urinary retention due to benign prostatic hyperplasia  Active Problems:    Primary hypertension    Recurrent UTI      Home Pulmonary Medications:  None       Past Medical History[1]  Social History[2]    Subjective         Objective    Physical Exam:   Assessment Type: Assess only  General Appearance: Alert, Awake  Respiratory Pattern: Normal  Chest Assessment: Chest expansion symmetrical  Bilateral Breath Sounds: Clear    Vitals:  Blood pressure 134/72, pulse 89, temperature 97.5 °F (36.4 °C), resp. rate 16, height 5' 10\" (1.778 m), weight 86.6 kg (191 lb), SpO2 95%.          Imaging and other studies: N/A          Plan    Respiratory Plan: No distress/Pulmonary history, Discontinue Protocol                 [1]   Past Medical History:  Diagnosis Date    BPH (benign prostatic hypertrophy) Feb. 9, 2025    inability to urinate    Hyperlipidemia     ((Pt Qnr Sub: na))     Hypertension     ((Pt Qnr Sub: na))    [2]   Social History  Socioeconomic History    Marital status: /Civil Union   Tobacco Use    Smoking status: Never    Smokeless tobacco: Never    Tobacco comments:     ZERO   Vaping Use    Vaping status: Never Used   Substance and Sexual Activity    Alcohol use: Never    Drug use: Never    Sexual activity: Yes     Partners: Female     Birth control/protection: None     Comment:  to one wife for 41 years; only partner ever.     Social Drivers of Health     Food Insecurity: No Food Insecurity (6/10/2025)    Nursing - Inadequate Food Risk Classification     Worried About Running Out of Food in the Last Year: Never true     Ran Out of Food in the Last Year: Never true     Ran Out of Food in the Last Year: Never true   Transportation Needs: No Transportation Needs (6/10/2025)    Nursing - Transportation Risk Classification     Lack of Transportation: No "   Intimate Partner Violence: Unknown (6/10/2025)    Nursing IPS     Physically Hurt by Someone: No     Hurt or Threatened by Someone: No   Housing Stability: Unknown (6/10/2025)    Nursing: Inadequate Housing Risk Classification     Unable to Pay for Housing in the Last Year: No     Has Housin

## 2025-06-10 NOTE — OP NOTE
OPERATIVE REPORT  PATIENT NAME: Trent Fonseca    :  1960  MRN: [id number]  Pt Location: [facility] OR ROOM 04    SURGERY DATE: 6/10/2025    Surgeons and Role:     * Cholo Tidwell MD - Primary     * Megha Marroquin PA-C - Assisting    Preop Diagnosis:  Benign prostatic hyperplasia with urinary retention [N40.1, R33.8]    Post-Op Diagnosis Codes:     * Benign prostatic hyperplasia with urinary retention [N40.1, R33.8]    Procedure(s):  PROSTATECTOMY.SUPRAPUBIC LAPAROSCOPIC WITH ROBOTICS. LYSIS OF ADHESIONS    Specimen(s):  ID Type Source Tests Collected by Time Destination   1 : Prostate Adenoma Tissue Prostate TISSUE EXAM Cholo Tidwell MD 6/10/2025 1109        Estimated Blood Loss:   250 mL    Drains:  Urethral Catheter Three way;Other (Comment) 24 Fr. (Active)   Reasons to continue Urinary Catheter  Continuous Bladder Irrigation (CBI) 06/10/25 1038   Collection Container Standard drainage bag 06/10/25 1038   CBI Irrigant Normal saline 06/10/25 1038   Number of days: 0       [REMOVED] NG/OG/Enteral Tube Orogastric 18 Fr Right mouth (Removed)   Number of days: 0       [REMOVED] Urethral Catheter Coude 16 Fr. (Removed)   Number of days: 101       Anesthesia Type:   General    Operative Indications:  Patient with persistent urinary retention since 2025 found to have an enlarged prostate.  Also with an adrenal myolipoma measuring 6 cm for which he follows with endocrinology.  We discussed the role for surgery to address both of these issue and he elected to address the prostate first rather than via combined surgery.    Operative Findings:  Extensive lower pelvic adhesions secondary to prior open appendectomy quiring lysis taking 35 minutes  Simple prostatectomy performed with good hemostasis and anastomotic reconstruction           Complications:   None    Procedure and Technique:    The patient was brought to the operating room.  Anesthesia was induced.  He was given antibiotics in the form of  Ancef.  He was moved to dorsal lithotomy position.  He was prepped and draped in standard sterile fashion.    A time-out was performed identifying the correct patient site and procedure.  A Bella catheter was placed into the bladder.  A Veress needle was used in the midline above the umbilicus to gain access to the peritoneum which was confirmed with a drop test of saline.  The abdomen was insufflated to a pressure 15 mmHg without issues.  A 8 mm robotic trocar was placed at the midline 18 cm cephalad to pubic symphysis.  A 30 camera was passed through the trocar.  The abdomen was surveyed.  There were extensive adhesions over taking the lower pelvis such that we cannot place ports on the right side but we could place our ports in the proposed locations on the left side as well as a 5 mm upper right quadrant port we placed the left-sided ports and the 5 mm port and we then used these ports to carefully take down the patient's adhesions which were primarily small bowel to the anterior abdominal wall.  This was done with a combination of laparoscopic scissors and Kitner without energy.  Once we cleared enough space to safely place our right robotic and assistant ports these were placed under vision.    The placed was placed in steep Trendelenburg position and the robot was then docked.  The patient's adhesions were then further taken down to allow access to the bladder and sidewalls.  The plane between the adhesions on the left side and the medial umbilical ligament was difficult to define.  Some bleeding was appreciated from the medial umbilical ligament and this was cauterized with good effect.  Total lyse of adhesions took over 35 minutes.      The bladder was distended with saline.  He was then incised in the midline using cautery on the peritoneum and muscle and sharply incising the mucosa.  The bladder wall was quite thick.  He was open for a length of approximately 12 cm.  The balloon was taken down.  Stay  sutures were placed at 4 locations in the quadrants of the incised bladder to create a open working field inside the bladder.    The patient had a large median lobe and the ureteral orifices were identified very close to the base of this lobe..      Attention was now turned to simple prostatectomy.  The mucosa around the bladder neck was scored circumferentially.  It was then entered and the prostate adenoma was dissected with a combination of sharp and blunt dissection with cautery as needed.  A Cobra grasper was used through the 4th arm to help gain traction on the adenoma to facilitate visualization and dissection as we got deeper.  Care was taken to ensure the correct tissue plane throughout dissection.  There was moderate bleeding which was addressed as much as possible during dissection with spot cautery.  As we got closer to removing the adenoma the patient's median lobe tissue was making visualization difficult so this was dissected off the remaining tissue to facilitate better access to the deeper aspects of the prostatic cavity.  We were then able to continue our dissection planes for conventionally.  Catheter was advanced back into the bladder to ensure the urethra could be visualized.  The urethra was transected and this freed the adenoma.  The residual tissue was removed from the bladder and placed in a specimen bag.    Attention was now turned to obtaining hemostasis within the prostatic fossa.  Using a combination of monopolar and bipolar energy bleeding points in the fossa were cauterized with good effect.  The pressure was turned down to 7 mmHg and no significant bleeding was appreciated within the fossa.       Attention was now turned to mucosal advancement which was performed in a circumferential fashion using two 3-0 V lock sutures advancing the bladder neck mucosa to the urethra with good effect.  Floseal was placed in the prostatic fossa gutters before anastomosis complete    A new 24 French 3  way Bella catheter was then placed into the bladder without difficulty.  Attention was turned to bladder closure which was performed in 2 layers 1st using a 3-0 V lock and then a 2-0 Stratafix.  After the bladder was closed it was filled with approximately 200 cc of saline and did not show evidence of leak.  The bladder was drained and the catheter was hooked up to CBI.    The 12 mm assistant port was removed and the area closed with an endo-close using 0 Vicryl suture.  The abdomen was surveyed again.  There was no signs of bowel injury appreciated.    The robotic ports were then removed and the abdomen was desufflated.  The midline incision was opened further and the specimen bag was extracted through this site.  It was then closed using 2 0 Vicryl sutures.  All port sites were then closed superficially with 4-0 Monocryl suture and skin glue.    The patient's urine remained clear on CBI.    The patient was woken from anesthesia transferred to PACU in stable condition.    A qualified resident physician was not available    Patient Disposition:  PACU       Plan:  The patient will be monitored overnight on CBI.  If his CBI is able to be weaned off with appropriate labs and he is tolerating diet and passing gas than he will be discharged home with a catheter in place with plan for removal in 2 weeks after which the catheter will be removed.      A qualified resident physician was not available.    Patient Disposition:  PACU          SIGNATURE: Cholo Tidwell MD  DATE: Key[place] 10, 2025  TIME: 11:29 AM

## 2025-06-11 ENCOUNTER — TELEPHONE (OUTPATIENT)
Dept: OTHER | Facility: HOSPITAL | Age: 65
End: 2025-06-11

## 2025-06-11 VITALS
HEIGHT: 70 IN | TEMPERATURE: 98.1 F | DIASTOLIC BLOOD PRESSURE: 74 MMHG | HEART RATE: 85 BPM | OXYGEN SATURATION: 95 % | BODY MASS INDEX: 27.35 KG/M2 | WEIGHT: 191 LBS | RESPIRATION RATE: 16 BRPM | SYSTOLIC BLOOD PRESSURE: 150 MMHG

## 2025-06-11 LAB
ANION GAP SERPL CALCULATED.3IONS-SCNC: 9 MMOL/L (ref 4–13)
BUN SERPL-MCNC: 16 MG/DL (ref 5–25)
CALCIUM SERPL-MCNC: 8.7 MG/DL (ref 8.4–10.2)
CHLORIDE SERPL-SCNC: 104 MMOL/L (ref 96–108)
CO2 SERPL-SCNC: 26 MMOL/L (ref 21–32)
CREAT SERPL-MCNC: 1.15 MG/DL (ref 0.6–1.3)
ERYTHROCYTE [DISTWIDTH] IN BLOOD BY AUTOMATED COUNT: 13 % (ref 11.6–15.1)
GFR SERPL CREATININE-BSD FRML MDRD: 65 ML/MIN/1.73SQ M
GLUCOSE SERPL-MCNC: 114 MG/DL (ref 65–140)
HCT VFR BLD AUTO: 37.4 % (ref 36.5–49.3)
HGB BLD-MCNC: 12.6 G/DL (ref 12–17)
MCH RBC QN AUTO: 30 PG (ref 26.8–34.3)
MCHC RBC AUTO-ENTMCNC: 33.7 G/DL (ref 31.4–37.4)
MCV RBC AUTO: 89 FL (ref 82–98)
PLATELET # BLD AUTO: 287 THOUSANDS/UL (ref 149–390)
PMV BLD AUTO: 10 FL (ref 8.9–12.7)
POTASSIUM SERPL-SCNC: 3.7 MMOL/L (ref 3.5–5.3)
RBC # BLD AUTO: 4.2 MILLION/UL (ref 3.88–5.62)
SODIUM SERPL-SCNC: 139 MMOL/L (ref 135–147)
WBC # BLD AUTO: 15.34 THOUSAND/UL (ref 4.31–10.16)

## 2025-06-11 PROCEDURE — 80048 BASIC METABOLIC PNL TOTAL CA: CPT | Performed by: UROLOGY

## 2025-06-11 PROCEDURE — NC001 PR NO CHARGE

## 2025-06-11 PROCEDURE — 85027 COMPLETE CBC AUTOMATED: CPT | Performed by: UROLOGY

## 2025-06-11 PROCEDURE — 99024 POSTOP FOLLOW-UP VISIT: CPT | Performed by: UROLOGY

## 2025-06-11 RX ORDER — DOCUSATE SODIUM 100 MG/1
100 CAPSULE, LIQUID FILLED ORAL 2 TIMES DAILY
Qty: 28 CAPSULE | Refills: 0 | Status: SHIPPED | OUTPATIENT
Start: 2025-06-11 | End: 2025-06-25

## 2025-06-11 RX ORDER — POLYETHYLENE GLYCOL 3350 17 G/17G
17 POWDER, FOR SOLUTION ORAL DAILY
Qty: 238 G | Refills: 0 | Status: SHIPPED | OUTPATIENT
Start: 2025-06-11 | End: 2025-06-24 | Stop reason: CLARIF

## 2025-06-11 RX ADMIN — SODIUM CHLORIDE, SODIUM LACTATE, POTASSIUM CHLORIDE, AND CALCIUM CHLORIDE 125 ML/HR: .6; .31; .03; .02 INJECTION, SOLUTION INTRAVENOUS at 07:14

## 2025-06-11 RX ADMIN — SENNOSIDES 8.6 MG: 8.6 TABLET, FILM COATED ORAL at 08:54

## 2025-06-11 RX ADMIN — BACITRACIN ZINC, NEOMYCIN, POLYMYXIN B 1 LARGE APPLICATION: 400; 3.5; 5 OINTMENT TOPICAL at 09:04

## 2025-06-11 RX ADMIN — SODIUM CHLORIDE FOR IRRIGATION 3000 ML: 0.9 SOLUTION IRRIGATION at 00:30

## 2025-06-11 RX ADMIN — ACETAMINOPHEN 650 MG: 325 TABLET ORAL at 11:42

## 2025-06-11 RX ADMIN — FINASTERIDE 5 MG: 5 TABLET, FILM COATED ORAL at 08:54

## 2025-06-11 RX ADMIN — DOCUSATE SODIUM 100 MG: 100 CAPSULE, LIQUID FILLED ORAL at 08:54

## 2025-06-11 RX ADMIN — ACETAMINOPHEN 650 MG: 325 TABLET ORAL at 00:12

## 2025-06-11 RX ADMIN — OXYBUTYNIN CHLORIDE 5 MG: 5 TABLET ORAL at 08:54

## 2025-06-11 RX ADMIN — ACETAMINOPHEN 650 MG: 325 TABLET ORAL at 05:27

## 2025-06-11 RX ADMIN — CEFAZOLIN SODIUM 2000 MG: 2 SOLUTION INTRAVENOUS at 00:12

## 2025-06-11 NOTE — ANESTHESIA POSTPROCEDURE EVALUATION
Post-Op Assessment Note    CV Status:  Stable    Pain management: adequate       Mental Status:  Alert and awake   Hydration Status:  Euvolemic   PONV Controlled:  Controlled   Airway Patency:  Patent  Two or more mitigation strategies used for obstructive sleep apnea   Post Op Vitals Reviewed: Yes    No anethesia notable event occurred.    Staff: Anesthesiologist           Last Filed PACU Vitals:  Vitals Value Taken Time   Temp 97.5 °F (36.4 °C) 06/10/25 16:15   Pulse 89 06/10/25 16:15   /72 06/10/25 16:15   Resp 16 06/10/25 16:15   SpO2 95 % 06/10/25 16:15       Modified Miah:     Vitals Value Taken Time   Activity 2 06/10/25 14:15   Respiration 2 06/10/25 14:15   Circulation 2 06/10/25 14:15   Consciousness 2 06/10/25 14:15   Oxygen Saturation 2 06/10/25 14:15     Modified Miah Score: 10

## 2025-06-11 NOTE — NURSING NOTE
AVS reviewed with patient and patient's wife. All questions answered. New supplies given to patient to take home. Patient expressed feeling comfortable caring for the rich catheter as he shared having it in prior to this admission for several months.

## 2025-06-11 NOTE — PLAN OF CARE
Problem: PAIN - ADULT  Goal: Verbalizes/displays adequate comfort level or baseline comfort level  Description: Interventions:  - Encourage patient to monitor pain and request assistance  - Assess pain using appropriate pain scale  - Administer analgesics as ordered based on type and severity of pain and evaluate response  - Implement non-pharmacological measures as appropriate and evaluate response  - Consider cultural and social influences on pain and pain management  - Notify physician/advanced practitioner if interventions unsuccessful or patient reports new pain  - Educate patient/family on pain management process including their role and importance of  reporting pain   - Provide non-pharmacologic/complimentary pain relief interventions  Outcome: Progressing     Problem: INFECTION - ADULT  Goal: Absence or prevention of progression during hospitalization  Description: INTERVENTIONS:  - Assess and monitor for signs and symptoms of infection  - Monitor lab/diagnostic results  - Monitor all insertion sites, i.e. indwelling lines, tubes, and drains  - Monitor endotracheal if appropriate and nasal secretions for changes in amount and color  - [place] appropriate cooling/warming therapies per order  - Administer medications as ordered  - Instruct and encourage patient and family to use good hand hygiene technique  - Identify and instruct in appropriate isolation precautions for identified infection/condition  Outcome: Progressing  Goal: Absence of fever/infection during neutropenic period  Description: INTERVENTIONS:  - Monitor WBC  - Perform strict hand hygiene  - Limit to healthy visitors only  - No plants, dried, fresh or silk flowers with silveira in patient room  Outcome: Progressing     Problem: SAFETY ADULT  Goal: Patient will remain free of falls  Description: INTERVENTIONS:  - Educate patient/family on patient safety including physical limitations  - Instruct patient to call for assistance with activity   -  Consider consulting OT/PT to assist with strengthening/mobility based on AM PAC & JH-HLM score  - Consult OT/PT to assist with strengthening/mobility   - Keep Call bell within reach  - Keep bed low and locked with side rails adjusted as appropriate  - Keep care items and personal belongings within reach  - Initiate and maintain comfort rounds  - Make Fall Risk Sign visible to staff  - Offer Toileting every 2 Hours, in advance of need  - Initiate/Maintain bedalarm  - Obtain necessary fall risk management equipment:   - Apply yellow socks and bracelet for high fall risk patients  - Consider moving patient to room near nurses station  Outcome: Progressing  Goal: Maintain or return to baseline ADL function  Description: INTERVENTIONS:  -  Assess patient's ability to carry out ADLs; assess patient's baseline for ADL function and identify physical deficits which impact ability to perform ADLs (bathing, care of mouth/teeth, toileting, grooming, dressing, etc.)  - Assess/evaluate cause of self-care deficits   - Assess range of motion  - Assess patient's mobility; develop plan if impaired  - Assess patient's need for assistive devices and provide as appropriate  - Encourage maximum independence but intervene and supervise when necessary  - Involve family in performance of ADLs  - Assess for home care needs following discharge   - Consider OT consult to assist with ADL evaluation and planning for discharge  - Provide patient education as appropriate  - Monitor functional capacity and physical performance, use of AM PAC & JH-HLM   - Monitor gait, balance and fatigue with ambulation    Outcome: Progressing  Goal: Maintains/Returns to pre admission functional level  Description: INTERVENTIONS:  - Perform AM-PAC 6 Click Basic Mobility/ Daily Activity assessment daily.  - Set and communicate daily mobility goal to care team and patient/family/caregiver.   - Collaborate with rehabilitation services on mobility goals if consulted  -  Perform Range of Motion 3 times a day.  - Reposition patient every 2 hours.  - Dangle patient 3 times a day  - Stand patient 3 times a day  - Ambulate patient 3 times a day  - Out of bed to chair 3 times a day   - Out of bed for meals 3 times a day  - Out of bed for toileting  - Record patient progress and toleration of activity level   Outcome: Progressing     Problem: DISCHARGE PLANNING  Goal: Discharge to home or other facility with appropriate resources  Description: INTERVENTIONS:  - Identify barriers to discharge w/patient and caregiver  - Arrange for needed discharge resources and transportation as appropriate  - Identify discharge learning needs (meds, wound care, etc.)  - Arrange for interpretive services to assist at discharge as needed  - Refer to Case Management Department for coordinating discharge planning if the patient needs post-hospital services based on physician/advanced practitioner order or complex needs related to functional status, cognitive ability, or social support system  Outcome: Progressing     Problem: Knowledge Deficit  Goal: Patient/family/caregiver demonstrates understanding of disease process, treatment plan, medications, and discharge instructions  Description: Complete learning assessment and assess knowledge base.  Interventions:  - Provide teaching at level of understanding  - Provide teaching via preferred learning methods  Outcome: Progressing

## 2025-06-11 NOTE — TELEPHONE ENCOUNTER
Post op prostatectomy by Dr. Tidwell 6/10, please call to schedule trial of void, currently scheduled [place] and then [place] in the afternoon for follow-up with Dr. Tidwell.  Patient and wife wondering if appointments can be changed to same location.

## 2025-06-11 NOTE — PROGRESS NOTES
"Progress Note - Urology   Name: Trent Fonseca 64 y.o. male I MRN: [id number]  Unit/Bed#: S -01 I Date of Admission: 6/10/2025   Date of Service: 6/11/2025 I Hospital Day: 0     Assessment & Plan  Urinary retention due to benign prostatic hyperplasia  POD1 suprapubic prostatectomy by Dr. Tidwell  WBC reactive 15  Creatinine normalized today 1.1  CBI clamped, urine remains clear yellow  Abdominal pain, controlled no need for pain medication other than Tylenol scheduled.  Ambulatory has done 2 laps around the hallway.  Surgical incisions clean, dry.  Abdomen soft, nondistended.  Tolerating diet advancement and passing gas  Stable for discharge          Subjective:   HPI: Seen at bedside.  Reports he has done 2 laps around the hallways.  His abdominal pain is controlled.  He had a normal diet for lunch today, no nausea or vomiting.  He continues to pass gas.  His urine has remained clear yellow with CBI clamping.    Review of Systems   Constitutional:  Negative for chills and fever.   Respiratory:  Negative for cough and shortness of breath.    Cardiovascular:  Negative for chest pain and palpitations.   Gastrointestinal:  Positive for abdominal pain. Negative for vomiting.   Genitourinary:  Negative for dysuria and hematuria.   Musculoskeletal:  Negative for arthralgias and back pain.   Skin:  Negative for color change and rash.   Neurological:  Negative for seizures and syncope.   All other systems reviewed and are negative.      Objective:    Vitals: Blood pressure 150/74, pulse 85, temperature 98.1 °F (36.7 °C), temperature source Oral, resp. rate 16, height 5' 10\" (1.778 m), weight 86.6 kg (191 lb), SpO2 95%.,Body mass index is 27.41 kg/m².    Physical Exam  Constitutional:       General: He is not in acute distress.     Appearance: Normal appearance. He is normal weight. He is not ill-appearing or toxic-appearing.   HENT:      Head: Normocephalic and atraumatic.      Right Ear: External ear normal.      " Left Ear: External ear normal.      Nose: Nose normal.      Mouth/Throat:      Mouth: Mucous membranes are moist.     Eyes:      General: No scleral icterus.     Conjunctiva/sclera: Conjunctivae normal.       Cardiovascular:      Rate and Rhythm: Normal rate and regular rhythm.      Pulses: Normal pulses.      Heart sounds: Normal heart sounds.   Pulmonary:      Effort: Pulmonary effort is normal.   Abdominal:      General: There is no distension.      Tenderness: There is no abdominal tenderness. There is no right CVA tenderness, left CVA tenderness or guarding.      Comments: Surgical incisions clean, dry.  Abdomen soft, nondistended.   Genitourinary:     Comments: Bella catheter clear yellow    Musculoskeletal:         General: Normal range of motion.     Skin:     General: Skin is warm.      Findings: No rash.     Neurological:      General: No focal deficit present.      Mental Status: He is alert and oriented to person, place, and time. Mental status is at baseline.     Psychiatric:         Mood and Affect: Mood normal.         Behavior: Behavior normal.         Thought Content: Thought content normal.         Judgment: Judgment normal.             Labs:  Recent Labs     06/11/25  0449   WBC 15.34*       Recent Labs     06/10/25  1201 06/11/25  0449   HGB 13.9 12.6     Recent Labs     06/10/25  1201 06/11/25  0449   HCT 42.0 37.4     Recent Labs     06/10/25  1201 06/11/25  0449   CREATININE 1.50* 1.15         History:  Past Medical History[1]  Social History[2]  Past Surgical History[3]  Family History[4]    Hanh Ellis PA-C  Date: 6/11/2025 Time: 1:12 PM          [1]   Past Medical History:  Diagnosis Date    BPH (benign prostatic hypertrophy) Feb. 9, 2025    inability to urinate    Hyperlipidemia     ((Pt Qnr Sub: na))     Hypertension     ((Pt Qnr Sub: na))    [2]   Social History  Socioeconomic History    Marital status: /Civil Union   Tobacco Use    Smoking status: Never    Smokeless tobacco:  Never    Tobacco comments:     ZERO   Vaping Use    Vaping status: Never Used   Substance and Sexual Activity    Alcohol use: Never    Drug use: Never    Sexual activity: Yes     Partners: Female     Birth control/protection: None     Comment:  to one wife for 41 years; only partner ever.     Social Drivers of Health     Food Insecurity: No Food Insecurity (6/10/2025)    Nursing - Inadequate Food Risk Classification     Worried About Running Out of Food in the Last Year: Never true     Ran Out of Food in the Last Year: Never true     Ran Out of Food in the Last Year: Never true   Transportation Needs: No Transportation Needs (6/10/2025)    Nursing - Transportation Risk Classification     Lack of Transportation: No   Intimate Partner Violence: Unknown (6/10/2025)    Nursing IPS     Physically Hurt by Someone: No     Hurt or Threatened by Someone: No   Housing Stability: Unknown (6/10/2025)    Nursing: Inadequate Housing Risk Classification     Unable to Pay for Housing in the Last Year: No     Has Housin   [3]   Past Surgical History:  Procedure Laterality Date    APPENDECTOMY  2013    occurred in [facility]    COLONOSCOPY      ,,    CO LAPS SURG [id number] RPBIC RAD W/NRV SPARING ROBOT N/A 6/10/2025    Procedure: PROSTATECTOMY,SUPRAPUBIC LAPAROSCOPIC WITH ROBOTICS, LYSIS OF ADHESIONS;  Surgeon: Cholo Tidwell MD;  Location: [facility];  Service: Urology   [4]   Family History  Problem Relation Name Age of Onset    Heart disease Father David Fonseca         4-bypass late 60's,  78 ()    Heart disease Paternal Grandmother Savanna Fonseca         Heart condition,  65 years old (1967)    Cancer Mother Ashwini Coelho         breast (30's) ovarian (70's)  94 ()years    Heart disease Paternal Grandfather David Fonseca          of heart attack 64 years ()    Heart disease Paternal Aunt Meghan Salinas          78 ()

## 2025-06-11 NOTE — ASSESSMENT & PLAN NOTE
POD1 suprapubic prostatectomy by Dr. Tidwell  WBC reactive 15  Creatinine normalized today 1.1  CBI clamped, urine remains clear yellow  Abdominal pain, controlled no need for pain medication other than Tylenol scheduled.  Ambulatory has done 2 laps around the hallway.  Surgical incisions clean, dry.  Abdomen soft, nondistended.  Tolerating diet advancement and passing gas  Stable for discharge

## 2025-06-11 NOTE — DISCHARGE SUMMARY
Discharge Summary - Urology   Name: Trent Fonseca 64 y.o. male I MRN: [id number]  Unit/Bed#: S -01 I Date of Admission: 6/10/2025   Date of Service: 6/11/2025 I Hospital Day: 0    Discharge Summary - Trent Fonseca 64 y.o. male MRN: [id number]    Unit/Bed#: S -01 Encounter: [id number]    Admission Date: 6/10/2025     Discharge Date: 06/11/25    HPI: Trent Fonseca is a 64 y.o. male who presented for suprapubic laparoscopic robotic prostatectomy.      Procedure(s):  PROSTATECTOMY,SUPRAPUBIC LAPAROSCOPIC WITH ROBOTICS, LYSIS OF ADHESIONS  Surgeon(s):  ZACH Hernandez MD  6/10/2025    Hospital Course: Suprapubic laparoscopic robotic prostatectomy.  Surgery was difficult due to adhesions.  He was admitted to urology service overnight and started on CBI.  CBI was clamped in the morning urine remains clear yellow.  Patient has been ambulatory and has done 2 laps around the unit.  His pain is controlled with Tylenol.  Surgical incisions clean, dry, abdomen soft.  He is passing gas.  His labs are stable.  He is stable for discharge on the afternoon of 6/11/2025    Discharge Diagnosis: Urinary retention due to benign prostatic hyperplasia    Condition at Discharge: good    Discharge Medications:  See after visit summary for reconciled discharge medications provided to patient and family.  Patient was discharged home on home medications with the addition of MiraLAX, Colace, Tylenol, Levaquin-prescribed preoperatively-to finish remaining pills around catheter removal.     Discharge instructions/Information to patient and family:   See after visit summary for written and verbal information which has been provided to patient and family.      Provisions for Follow-Up Care:  See after visit summary for information related to follow-up care and any pertinent home health orders.      Disposition: Home    Planned Readmission: No    Discharge Statement   I spent 10 minutes  discharging the patient. This time was spent on the day of discharge. I had direct contact with the patient on the day of discharge. Additional documentation is required if more than 30 minutes were spent on discharge.     Signature:   Hanh Ellis PA-C  Date: 6/11/2025 Time: 1:22 PM

## 2025-06-12 LAB
ABO GROUP BLD BPU: NORMAL
ABO GROUP BLD BPU: NORMAL
BPU ID: NORMAL
BPU ID: NORMAL
CROSSMATCH: NORMAL
CROSSMATCH: NORMAL
UNIT DISPENSE STATUS: NORMAL
UNIT DISPENSE STATUS: NORMAL
UNIT PRODUCT CODE: NORMAL
UNIT PRODUCT CODE: NORMAL
UNIT PRODUCT VOLUME: 350 ML
UNIT PRODUCT VOLUME: 350 ML
UNIT RH: NORMAL
UNIT RH: NORMAL

## 2025-06-12 NOTE — TELEPHONE ENCOUNTER
Temperature upon waking was 99.3, took 2 Tylenol at 4:45 am. Temp currently is still  99.3, advised  to give 2 Tylenol again and advised to keep monitoring temperature.     He is eating and drinking. Denies all urinary symptoms, urine is yellow and clear and draining well into rich bag, denies pain    ED precautions reviewed

## 2025-06-12 NOTE — TELEPHONE ENCOUNTER
"Patient returned missed call and says that he is feeling \"fantastic\" with \"little to no pain\".      In addition patient states he is employing catheter bag ever hour to hour and a half. States the urine is a \"ginger ale color\" with a little \"tinge of orange\" but mostly clear. Denies seeing any \"red in any way\" in urine. Patient states he is passing gas. Has not had a bowel movement yet. Is taking stool softeners are recommended, and taking meds as prescribed. Pt also states he's doing the spirometer ten times every hour.     Upcoming 6/24 8:30 AM and 1:15 PM appointments in [facility] were confirmed. Patient was happy to learn that they are both in the same [facility] location.       "

## 2025-06-12 NOTE — TELEPHONE ENCOUNTER
LM to contact office. If he calls back, please ask how he is feeling. Normal PO expectations: blood in urine, urgency, frequency with catheter in place. He should call office with any ss of infection. Blood in urine that looks like dark red wine/clots. Urine not draining into bag. Take medications as prescribed. Make sure he is drinking at least 64 oz of water and increase fiber. We do not want him to strain while have a BM. Please confirm he is passing gas/ had BM since surgery. Please confirm appointments. He should call office with any questions or concerns.

## 2025-06-13 NOTE — TELEPHONE ENCOUNTER
Unable to hold catheter to stat lock due to three prongs. Advised to wear leg bag with straps during the day.    Continue with 99.8, discussed that this is low grade and to continue with Tylenol as needed and monitor    Sarah Schnitzler, PA-C to [facility]    6/12/25  1:33 PM  Note     This is a normal temperature and not a fever. Can continue to monitor.

## 2025-06-17 PROCEDURE — 88307 TISSUE EXAM BY PATHOLOGIST: CPT | Performed by: STUDENT IN AN ORGANIZED HEALTH CARE EDUCATION/TRAINING PROGRAM

## 2025-06-24 ENCOUNTER — OFFICE VISIT (OUTPATIENT)
Dept: UROLOGY | Facility: AMBULATORY SURGERY CENTER | Age: 65
End: 2025-06-24
Payer: MEDICARE

## 2025-06-24 ENCOUNTER — PROCEDURE VISIT (OUTPATIENT)
Dept: UROLOGY | Facility: AMBULATORY SURGERY CENTER | Age: 65
End: 2025-06-24
Payer: MEDICARE

## 2025-06-24 VITALS
BODY MASS INDEX: 25.77 KG/M2 | OXYGEN SATURATION: 97 % | HEIGHT: 70 IN | DIASTOLIC BLOOD PRESSURE: 74 MMHG | HEART RATE: 96 BPM | SYSTOLIC BLOOD PRESSURE: 124 MMHG | WEIGHT: 180 LBS

## 2025-06-24 VITALS
HEART RATE: 98 BPM | BODY MASS INDEX: 27.41 KG/M2 | SYSTOLIC BLOOD PRESSURE: 162 MMHG | HEIGHT: 70 IN | DIASTOLIC BLOOD PRESSURE: 82 MMHG

## 2025-06-24 DIAGNOSIS — R33.8 URINARY RETENTION DUE TO BENIGN PROSTATIC HYPERPLASIA: Primary | ICD-10-CM

## 2025-06-24 DIAGNOSIS — N40.1 URINARY RETENTION DUE TO BENIGN PROSTATIC HYPERPLASIA: Primary | ICD-10-CM

## 2025-06-24 DIAGNOSIS — E27.8 ADRENAL MASS GREATER THAN 4 CM IN DIAMETER (HCC): ICD-10-CM

## 2025-06-24 DIAGNOSIS — R97.20 ELEVATED PSA: ICD-10-CM

## 2025-06-24 DIAGNOSIS — N39.0 RECURRENT UTI: ICD-10-CM

## 2025-06-24 DIAGNOSIS — E27.8 ADRENAL MASS GREATER THAN 4 CM IN DIAMETER WITH NO HISTORY OF MALIGNANT NEOPLASM (HCC): ICD-10-CM

## 2025-06-24 LAB — POST-VOID RESIDUAL VOLUME, ML POC: 10 ML

## 2025-06-24 PROCEDURE — 51798 US URINE CAPACITY MEASURE: CPT | Performed by: UROLOGY

## 2025-06-24 PROCEDURE — 99024 POSTOP FOLLOW-UP VISIT: CPT | Performed by: UROLOGY

## 2025-06-24 PROCEDURE — 99024 POSTOP FOLLOW-UP VISIT: CPT

## 2025-06-24 RX ORDER — CEFAZOLIN SODIUM 2 G/50ML
2000 SOLUTION INTRAVENOUS ONCE
OUTPATIENT
Start: 2025-06-24 | End: 2025-06-24

## 2025-06-24 NOTE — ASSESSMENT & PLAN NOTE
Patient had Klebsiella urinary tract infection associate with catheter.  I am hopeful that his risk for infections will reduce now that he is status post robotic simple prostatectomy surgery.  Of note the patient does have risk for orchitis epididymitis around this surgery and he is on antibiotics since his catheter was removed today.

## 2025-06-24 NOTE — PROGRESS NOTES
"6/24/2025    Trent Fonseca is a 64 y.o. male  [id number]    Diagnosis:  Chief Complaint    Rich removal       Patient presents for rich catheter removal s/p PROSTATECTOMY,SUPRAPUBIC LAPAROSCOPIC WITH ROBOTICS, LYSIS OF ADHESIONS (Abdomen) on 6/10/2025 with Dr. Tidwell    Plan:  Follow up as scheduled with Dr. Tidwell this afternoon  Educational handout provided and reviewed of s/s he may experience after catheter removal    Procedure:  Rich catheter removed after deflation of intact balloon with no issues. Patient tolerated well.    Vitals:    06/24/25 0840   BP: 162/82   BP Location: Left arm   Patient Position: Sitting   Cuff Size: Adult   Pulse: 98   Height: 5' 10\" (1.778 m)             OLVIN Christie, RN   "

## 2025-06-24 NOTE — ASSESSMENT & PLAN NOTE
Patient is status post robot simple prostatectomy.  Tissue showed benign changes.  He is voiding with a very strong stream.  PVR 10 cc.  We discussed the risk for infection he is going to continue his course of antibiotics around catheter removal.  Otherwise plan to see him back with repeat PVR in a few months.

## 2025-06-24 NOTE — ASSESSMENT & PLAN NOTE
Patient has a benign adrenal mass.  We discussed role for resection as that the size of there is risk for bleeding and potential mass effect on nearby organs.  We offered the patient surgery on his adrenal gland at time of his prostatectomy but he elected to have these done at a separate date.  We discussed this further today.  I told him we can do surgery or he can also be seen by surgical oncology.  He prefer to do surgery with us.  He is can have a CT scan in July.  Will plan for surgery be done tentatively this fall pending the results of the scan.  I expect he will have issues with adhesions again which increased risk for bowel injury.  We also discussed other risks of surgery including bleeding infection and harm to nearby organs.  Consent was signed.

## 2025-06-24 NOTE — Clinical Note
Please look for an operative date for left adrenalectomy for this patient in September or October.  No clearance needed.

## 2025-06-24 NOTE — PROGRESS NOTES
Assessment/Plan:    Urinary retention due to benign prostatic hyperplasia  Patient is status post robot simple prostatectomy.  Tissue showed benign changes.  He is voiding with a very strong stream.  PVR 10 cc.  We discussed the risk for infection he is going to continue his course of antibiotics around catheter removal.  Otherwise plan to see him back with repeat PVR in a few months.    Recurrent UTI  Patient had Klebsiella urinary tract infection associate with catheter.  I am hopeful that his risk for infections will reduce now that he is status post robotic simple prostatectomy surgery.  Of note the patient does have risk for orchitis epididymitis around this surgery and he is on antibiotics since his catheter was removed today.    Elevated PSA  Prostate pathology was benign    Adrenal mass greater than 4 cm in diameter (HCC)  Patient has a benign adrenal mass.  We discussed role for resection as that the size of there is risk for bleeding and potential mass effect on nearby organs.  We offered the patient surgery on his adrenal gland at time of his prostatectomy but he elected to have these done at a separate date.  We discussed this further today.  I told him we can do surgery or he can also be seen by surgical oncology.  He prefer to do surgery with us.  He is can have a CT scan in July.  Will plan for surgery be done tentatively this fall pending the results of the scan.  I expect he will have issues with adhesions again which increased risk for bowel injury.  We also discussed other risks of surgery including bleeding infection and harm to nearby organs.  Consent was signed.          Subjective:      Patient ID: Trent Fonseca is a 64 y.o. male.    HPI    Trent Fonseca is a 64 y.o. male with urinary retention associated with a large prostate and also has a 6 cm left adrenal myolipoma now status post simple prostatectomy.      He was seen in the ED on 2/9/25 with inability to urinate requiring rich  catheter insertion. PSA was obtained shortly after rich was placed and was elevated at 7. No prior PSAs on file for review. CT in ED showing BPH as well as large adrenal mass.  Leading up to this, he reports intermittent issues with weak urinary stream x years.  He had cystoscopy for by Dr. Larson and was recommended to consider robot simple prostatectomy based on anatomical findings of trilobar hypertrophy with large prostatic extrusion into the bladder component.  Positive volume 120 g.     Patient had a Klebsiella UTI based on symptoms of back pain.    He underwent robotic simple prostatectomy requiring extensive lysis of lower pelvic adhesions (secondary to prior open appendectomy).  His catheter was removed today.  He is voiding with very strong stream.  PVR 10 cc.       No prior  surgical manipulation. No family history of  malignancy.      Patient's most recent PSA remains elevated at 7.6 which is stable from when checked in February 2025.   He reports his PSA has been in 5-6 range for years. No prior prostate biopsy or work-up for this.  Pathology from robotic prostatectomy was 100 g of benign tissue.     The patient was found to have a 6.3 x 6.2 x 5.1 cm left sided adrenal lesion which is arising from the medial limb mostly compromise of bulk fat with peripheral calcifications which likely representative of a myelolipoma of adrenal gland. Initial biochemical testing showed unremarkable result for pheochromocytoma.he had elevated renin levels thought llikely due to ARB use.  He had a negative dexamethasone suppression test he has seen endocrinology who have ordered a repeat CT scan in July 2025     Patient has a history of open appendectomy years ago.    Past Surgical History[1]     Past Medical History[2]     AUA SYMPTOM SCORE      Flowsheet Row Most Recent Value   AUA SYMPTOM SCORE    How often have you had a sensation of not emptying your bladder completely after you finished urinating? 0 (P)    "  How often have you had to urinate again less than two hours after you finished urinating? 0 (P)     How often have you found you stopped and started again several times when you urinate? 0 (P)     How often have you found it difficult to postpone urination? 0 (P)     How often have you had a weak urinary stream? 0 (P)     How often have you had to push or strain to begin urination? 0 (P)     How many times did you most typically get up to urinate from the time you went to bed at night until the time you got up in the morning? 0 (P)     Quality of Life: If you were to spend the rest of your life with your urinary condition just the way it is now, how would you feel about that? 5 (P)     AUA SYMPTOM SCORE 0 (P)               Review of Systems   Constitutional:  Negative for chills and fever.   HENT:  Negative for ear pain and sore throat.    Eyes:  Negative for pain and visual disturbance.   Respiratory:  Negative for cough and shortness of breath.    Cardiovascular:  Negative for chest pain and palpitations.   Gastrointestinal:  Negative for abdominal pain and vomiting.   Genitourinary:  Negative for dysuria and hematuria.   Musculoskeletal:  Negative for arthralgias and back pain.   Skin:  Negative for color change and rash.   Neurological:  Negative for seizures and syncope.   All other systems reviewed and are negative.        Objective:      /74 (BP Location: Left arm, Patient Position: Sitting, Cuff Size: Adult)   Pulse 96   Ht 5' 10\" (1.778 m) Comment: verbal  Wt 81.6 kg (180 lb)   SpO2 97%   BMI 25.83 kg/m²     Lab Results   Component Value Date    PSA 7.628 (H) 05/01/2025    PSA 7.418 (H) 02/12/2025          Physical Exam  Vitals reviewed.   Constitutional:       Appearance: Normal appearance. He is normal weight.   HENT:      Head: Normocephalic and atraumatic.     Eyes:      Pupils: Pupils are equal, round, and reactive to light.     Abdominal:      General: Abdomen is flat. There is no " distension.      Palpations: There is no mass.      Tenderness: There is no abdominal tenderness. There is no guarding or rebound.      Hernia: No hernia is present.      Comments: Incisions are clean dry intact and healing without signs of dehiscence infection or hernia.     Neurological:      General: No focal deficit present.      Mental Status: He is alert and oriented to person, place, and time.     Psychiatric:         Mood and Affect: Mood normal.         Thought Content: Thought content normal.           Orders  Orders Placed This Encounter   Procedures    POCT Measure PVR          [1]   Past Surgical History:  Procedure Laterality Date    APPENDECTOMY  2013    occurred in [facility]    COLONOSCOPY      2011,2018,2024    [facility] LAPS SURG [id number] RPBIC RAD W/NRV SPARING ROBOT N/A 6/10/2025    Procedure: PROSTATECTOMY,SUPRAPUBIC LAPAROSCOPIC WITH ROBOTICS, LYSIS OF ADHESIONS;  Surgeon: Cholo Tidwell MD;  Location: [facility];  Service: Urology   [2]   Past Medical History:  Diagnosis Date    BPH (benign prostatic hypertrophy) Feb. 9, 2025    inability to urinate    Hyperlipidemia     ((Pt Qnr Sub: na))     Hypertension     ((Pt Qnr Sub: na))

## 2025-07-08 ENCOUNTER — HOSPITAL ENCOUNTER (OUTPATIENT)
Dept: CT IMAGING | Facility: HOSPITAL | Age: 65
Discharge: HOME | End: 2025-07-08
Attending: STUDENT IN AN ORGANIZED HEALTH CARE EDUCATION/TRAINING PROGRAM
Payer: MEDICARE

## 2025-07-08 DIAGNOSIS — I10 PRIMARY HYPERTENSION: ICD-10-CM

## 2025-07-08 DIAGNOSIS — E27.8 LEFT ADRENAL MASS (HCC): ICD-10-CM

## 2025-07-08 PROCEDURE — 74178 CT ABD&PLV WO CNTR FLWD CNTR: CPT

## 2025-07-08 RX ADMIN — IOHEXOL 100 ML: 350 INJECTION, SOLUTION INTRAVENOUS at 07:53

## 2025-07-09 ENCOUNTER — PREP FOR PROCEDURE (OUTPATIENT)
Dept: UROLOGY | Facility: CLINIC | Age: 65
End: 2025-07-09

## 2025-07-09 ENCOUNTER — TELEPHONE (OUTPATIENT)
Dept: UROLOGY | Facility: CLINIC | Age: 65
End: 2025-07-09

## 2025-07-09 ENCOUNTER — TELEPHONE (OUTPATIENT)
Age: 65
End: 2025-07-09

## 2025-07-09 DIAGNOSIS — E27.8 ADRENAL MASS GREATER THAN 4 CM IN DIAMETER WITH NO HISTORY OF MALIGNANT NEOPLASM (HCC): Primary | ICD-10-CM

## 2025-07-09 DIAGNOSIS — R39.89 SUSPECTED UTI: ICD-10-CM

## 2025-07-09 DIAGNOSIS — Z01.812 PRE-OPERATIVE LABORATORY EXAMINATION: ICD-10-CM

## 2025-07-09 DIAGNOSIS — Z01.810 PRE-OPERATIVE CARDIOVASCULAR EXAMINATION: ICD-10-CM

## 2025-07-09 DIAGNOSIS — Z79.01 LONG TERM (CURRENT) USE OF ANTICOAGULANTS: ICD-10-CM

## 2025-07-09 RX ORDER — ATORVASTATIN CALCIUM 40 MG/1
TABLET, FILM COATED ORAL
Qty: 100 TABLET | Refills: 1 | Status: SHIPPED | OUTPATIENT
Start: 2025-07-09

## 2025-07-09 NOTE — TELEPHONE ENCOUNTER
Spoke with patient and confirmed surgery date of:9/15/25  Type of surgery:LEFT ADRENALECTOMY LAP W/ ROBOTICS,LYSIS OF ADHESIONS LAP W/ ROBOTICS  Operating physician: Dr. Ernandez  Location of surgery: [facility]    Verbally went over prep with patient on: 7/9/25  NPO  Bowel prep? Yes  Hospital calls afternoon prior with arrival time -Calls Friday afternoon for Monday surgeries  Patient needs ride to and from surgery (outpatient/inpatient)   Pre-op testing to be done 2 weeks prior to surgery/PRE OP LABS, URINE CULTURE, EKG ORDERED FOR 8/29/25  (list labs needed)  Blood thinners:   List blood thinner/how long needs to held or no hold needed  Clearances needed: (Medical/Cardiac/None)    Mailed/emailed to patient on:EMAILED TO PT 7/10/25  Copy of packet scanned into Media on:7/10/25  Labs in packet  Soap / Bowel prep in packet  Pre-op & Post-op in packet  Dates of H&P and post-op if needed    Consent: in Media or on admit  If needed:BY DR ERNANDEZ (PT HAS AN APPOINTMENT W/ DR ERNANDEZ ON 8/21/25 AT [place] OFFICE)    Medical/Cardiac Clearance:  Appt with:  Appt date and time:  Date clearance form faxed:  Best fax number:    Medication Suspension of: Medication Name / how many days  Ordering provider:  Faxed Medication Suspension form on:  Best fax number:    [place]:  Faxed form to pharmacy on:    RBC blood bank done on:7/9/25    Rep info:  Emailed rep on date:

## 2025-07-09 NOTE — TELEPHONE ENCOUNTER
Patient called to get results of CT abdomen and pelvis results.    Patient is aware that eccrinology is also involved in his case. He was inquiring if Dr Tidwell would be the one to give results since he is having surgery with him.    Please advise and call back #[phone]   CT abdomen pelvis w wo contrast     IMPRESSION:     1. Stable left adrenal myelolipoma.  2. Interval atrophy of the prostate gland with interval development of a crescent like fluid collection in the mid prostate gland measuring 3.5 x 1.5 cm. Findings could represent sequelae of prostatitis with developing abscess.   3. Mild bladder wall thickening likely sequela of chronic urinary retention.-     4. CF-START-DON'T-DELETE-     Select the severity before publishing the critical finding: Urgent     Critical Findings Type: Other (please specify in notes)     Critical Findings Notes: Interval atrophy of the prostate gland with development of a crescent like fluid collection within the mid prostate gland measuring 3.5 x 1.5 cm possibly representing sequela of prostatitis/developing abscess. Clinically   correlate for pelvic pain

## 2025-07-10 ENCOUNTER — TELEPHONE (OUTPATIENT)
Dept: UROLOGY | Facility: AMBULATORY SURGERY CENTER | Age: 65
End: 2025-07-10

## 2025-07-10 PROBLEM — N39.0 RECURRENT UTI: Status: RESOLVED | Noted: 2025-05-27 | Resolved: 2025-07-10

## 2025-07-10 NOTE — TELEPHONE ENCOUNTER
Pt called to inquire if  has yet reviewed his CT results and provided recommendations. Pt will await call back from clinical team after provider review.     Call back: [phone]

## 2025-07-10 NOTE — TELEPHONE ENCOUNTER
I called the patient discussed the CT scan which I personally reviewed.  I do not think there are any concerning changes since he is not having issues with hematuria, fevers or chills or dysuria.  He is overall saying he is doing fantastic.  I do not think he has a prostate abscess.  I sent a message to radiology yesterday and again today about addending the report with the knowledge that he had simple prostatectomy surgery.     His adrenal gland lesion is otherwise stable and we on track for adrenalectomy in September.

## (undated) DEVICE — Device

## (undated) DEVICE — ARM DRAPE

## (undated) DEVICE — SUT MONOCRYL 4-0 PS-2 27 IN Y426H

## (undated) DEVICE — INSUFFLATION NEEDLE TO ESTABLISH PNEUMOPERITONEUM.: Brand: INSUFFLATION NEEDLE

## (undated) DEVICE — STERILE LUBRICATING JELLY, TUBE: Brand: HR LUBRICATING JELLY

## (undated) DEVICE — GLOVE INDICATOR PI UNDERGLOVE SZ 8 BLUE

## (undated) DEVICE — LARGE NEEDLE DRIVER: Brand: ENDOWRIST

## (undated) DEVICE — SUT VLOC 90 3-0 CV-23 9 IN VLOCM0844

## (undated) DEVICE — SURGICEL 4 X 8IN

## (undated) DEVICE — VISUALIZATION SYSTEM: Brand: CLEARIFY

## (undated) DEVICE — COLUMN DRAPE

## (undated) DEVICE — Device: Brand: OMNICLOSE TROCAR SITE CLOSURE DEVICE

## (undated) DEVICE — KIT, BETHLEHEM ROBOTIC PROST: Brand: CARDINAL HEALTH

## (undated) DEVICE — SUT VICRYL 2-0 SH 27 IN UNDYED J417H

## (undated) DEVICE — HEMOSTATIC MATRIX SURGIFLO 8ML W/THROMBIN

## (undated) DEVICE — LAPAROSCOPIC DISSECTOR: Brand: DEROYAL

## (undated) DEVICE — ASTOUND STANDARD SURGICAL GOWN, XL: Brand: CONVERTORS

## (undated) DEVICE — TROCAR: Brand: KII FIOS FIRST ENTRY

## (undated) DEVICE — CYSTO TUBING TUR Y IRRIGATION

## (undated) DEVICE — TIP COVER ACCESSORY

## (undated) DEVICE — BAG URINE DRAINAGE 2000ML ANTI RFLX LF

## (undated) DEVICE — SUT STRATAFIX SMTR PDS PLUS 1 CT-1 30CM SXPP1A435

## (undated) DEVICE — MONOPOLAR CURVED SCISSORS: Brand: ENDOWRIST

## (undated) DEVICE — SUT VLOC 90 3-0 90 CV-23 L9 IN VLOCM1944

## (undated) DEVICE — CHLORAPREP HI-LITE 26ML ORANGE

## (undated) DEVICE — SUT VICRYL 0 UR-6 27 IN J603H

## (undated) DEVICE — SYRINGE 10ML LL

## (undated) DEVICE — TROCAR PORT ACCESS 12 X120MM W/BLDLS OPTICAL TIP AIRSEAL

## (undated) DEVICE — TISSUE RETRIEVAL SYSTEM: Brand: INZII RETRIEVAL SYSTEM

## (undated) DEVICE — SEAL

## (undated) DEVICE — INTENDED FOR TISSUE SEPARATION, AND OTHER PROCEDURES THAT REQUIRE A SHARP SURGICAL BLADE TO PUNCTURE OR CUT.: Brand: BARD-PARKER SAFETY BLADES SIZE 11, STERILE

## (undated) DEVICE — SURGIFLO ENDOSCOPIC APPICATOR: Brand: ETHICON

## (undated) DEVICE — EXOFIN PRECISION PEN HIGH VISCOSITY TOPICAL SKIN ADHESIVE: Brand: EXOFIN PRECISION PEN, 1G

## (undated) DEVICE — HEM-O-LOK CLIP CARTRIDGE LARGE DA VINCI SI/XI 6CLIPS/EA

## (undated) DEVICE — COBRA GRASPER: Brand: ENDOWRIST;DAVINCI SI

## (undated) DEVICE — 40601 PROLONGED POSITIONING SYSTEM: Brand: 40601 PROLONGED POSITIONING SYSTEM

## (undated) DEVICE — DRAPE SHEET X-LG

## (undated) DEVICE — BAG URINE DRAINAGE 4000ML CONTINUOUS IRR

## (undated) DEVICE — FENESTRATED BIPOLAR FORCEPS: Brand: ENDOWRIST

## (undated) DEVICE — AIRSEAL TUBE SMOKE EVAC LUMENX3 FILTERED

## (undated) DEVICE — GLOVE SRG BIOGEL 7.5

## (undated) DEVICE — DECANTER: Brand: UNBRANDED